# Patient Record
Sex: FEMALE | Race: BLACK OR AFRICAN AMERICAN | NOT HISPANIC OR LATINO | Employment: OTHER | URBAN - METROPOLITAN AREA
[De-identification: names, ages, dates, MRNs, and addresses within clinical notes are randomized per-mention and may not be internally consistent; named-entity substitution may affect disease eponyms.]

---

## 2017-01-31 DIAGNOSIS — F03.90 DEMENTIA WITHOUT BEHAVIORAL DISTURBANCE, UNSPECIFIED DEMENTIA TYPE: ICD-10-CM

## 2017-01-31 RX ORDER — DONEPEZIL HYDROCHLORIDE 10 MG/1
10 TABLET, FILM COATED ORAL DAILY
Qty: 90 TABLET | Refills: 1 | Status: SHIPPED | OUTPATIENT
Start: 2017-01-31 | End: 2017-05-31 | Stop reason: SDUPTHER

## 2017-02-13 ENCOUNTER — OFFICE VISIT (OUTPATIENT)
Dept: FAMILY MEDICINE | Facility: CLINIC | Age: 82
End: 2017-02-13
Payer: MEDICARE

## 2017-02-13 VITALS
TEMPERATURE: 99 F | WEIGHT: 123.69 LBS | SYSTOLIC BLOOD PRESSURE: 126 MMHG | DIASTOLIC BLOOD PRESSURE: 82 MMHG | BODY MASS INDEX: 21.91 KG/M2 | HEIGHT: 63 IN | HEART RATE: 95 BPM | OXYGEN SATURATION: 97 % | RESPIRATION RATE: 17 BRPM

## 2017-02-13 DIAGNOSIS — E11.9 TYPE 2 DIABETES MELLITUS WITHOUT COMPLICATION, UNSPECIFIED LONG TERM INSULIN USE STATUS: ICD-10-CM

## 2017-02-13 DIAGNOSIS — K59.00 CONSTIPATION, UNSPECIFIED CONSTIPATION TYPE: Primary | ICD-10-CM

## 2017-02-13 PROCEDURE — 1159F MED LIST DOCD IN RCRD: CPT | Mod: S$GLB,,, | Performed by: NURSE PRACTITIONER

## 2017-02-13 PROCEDURE — 99999 PR PBB SHADOW E&M-EST. PATIENT-LVL III: CPT | Mod: PBBFAC,,, | Performed by: NURSE PRACTITIONER

## 2017-02-13 PROCEDURE — 1160F RVW MEDS BY RX/DR IN RCRD: CPT | Mod: S$GLB,,, | Performed by: NURSE PRACTITIONER

## 2017-02-13 PROCEDURE — 1157F ADVNC CARE PLAN IN RCRD: CPT | Mod: S$GLB,,, | Performed by: NURSE PRACTITIONER

## 2017-02-13 PROCEDURE — 99214 OFFICE O/P EST MOD 30 MIN: CPT | Mod: S$GLB,,, | Performed by: NURSE PRACTITIONER

## 2017-02-13 PROCEDURE — 1125F AMNT PAIN NOTED PAIN PRSNT: CPT | Mod: S$GLB,,, | Performed by: NURSE PRACTITIONER

## 2017-02-13 PROCEDURE — 99499 UNLISTED E&M SERVICE: CPT | Mod: S$GLB,,, | Performed by: NURSE PRACTITIONER

## 2017-02-13 RX ORDER — DOCUSATE SODIUM 250 MG
250 CAPSULE ORAL DAILY
Status: ON HOLD | COMMUNITY
Start: 2017-02-13 | End: 2018-02-08

## 2017-02-13 RX ORDER — LANCETS
1 EACH MISCELLANEOUS DAILY
Qty: 100 EACH | Refills: 1 | Status: SHIPPED | OUTPATIENT
Start: 2017-02-13 | End: 2017-05-03 | Stop reason: SDUPTHER

## 2017-02-13 RX ORDER — POLYETHYLENE GLYCOL 3350 17 G/17G
17 POWDER, FOR SOLUTION ORAL DAILY
Qty: 238 G | Refills: 0 | Status: ON HOLD | OUTPATIENT
Start: 2017-02-13 | End: 2018-02-08

## 2017-02-13 NOTE — PROGRESS NOTES
Subjective:       Patient ID: Delfina Pelletier is a 88 y.o. female.    Chief Complaint: Abdominal Pain (with swelling- LUQ ); Medication Refill; and Diabetic Foot Exam    HPI Ms Pelletier was sent here from [facility] for a swollen stomach. She denies any pain, N/V/D/F/C, no injury.  She has had constipation in the past.  She has a good appetite.  Review of Systems   Constitutional: Negative for fever.   Respiratory: Negative.    Cardiovascular: Negative.        Objective:      Physical Exam   Constitutional: She appears well-developed and well-nourished. She does not appear ill. No distress.   Cardiovascular: Normal rate, regular rhythm and normal heart sounds.  Exam reveals no friction rub.    No murmur heard.  Pulmonary/Chest: Effort normal and breath sounds normal. No respiratory distress. She has no decreased breath sounds. She has no wheezes. She has no rhonchi. She has no rales.   She has some prominent ribs to her left lower side, no TTP   Abdominal: There is no hepatosplenomegaly. There is no tenderness. There is no rebound and negative Casey's sign.   Skin: Skin is warm and dry. No erythema.   Psychiatric: She has a normal mood and affect. Her behavior is normal.   Vitals reviewed.      Assessment:       1. Constipation, unspecified constipation type    2. Type 2 diabetes mellitus without complication, unspecified long term insulin use status        Plan:       Constipation, unspecified constipation type  -     polyethylene glycol (MIRALAX) 17 gram/dose powder; Take 17 g by mouth once daily.  Dispense: 238 g; Refill: 0  -     docusate sodium (STOOL SOFTENER) 250 MG capsule; Take 1 capsule (250 mg total) by mouth once daily.    Type 2 diabetes mellitus without complication, unspecified long term insulin use status  -     blood sugar diagnostic Strp; 1 each by Misc.(Non-Drug; Combo Route) route once daily. True Metrix Glucometer Test Strips  Test blood sugar once daily  Dispense: 100 each; Refill: 1  -     lancets  Misc; 1 each by Misc.(Non-Drug; Combo Route) route once daily. TRUE METRIX METER  Dispense: 100 each; Refill: 1    H/o L pneumonectomy, no abnormalities noted in abdominal CT or CXR.  No red flags on exam.  Continue to monitor, consider imaging if she develops any problems.    Verbalized understanding

## 2017-02-13 NOTE — MR AVS SNAPSHOT
[facility]  [address] [place] [address]  Phone: [phone]                  Delfina Pelletier   2017 11:00 AM   Office Visit    Description:  Female : 10/26/1928   Provider:  AMALIA Martinez   Department:  [facility]           Reason for Visit     Abdominal Pain     Medication Refill     Diabetic Foot Exam           Diagnoses this Visit        Comments    Constipation, unspecified constipation type    -  Primary     Type 2 diabetes mellitus without complication, unspecified long term insulin use status                To Do List           Future Appointments        Provider Department Dept Phone    2017 11:00 AM Josefina Whitley NP-C [facility] [phone]      Goals (5 Years of Data)     None       These Medications        Disp Refills Start End    blood sugar diagnostic Strp 100 each 1 2017     1 each by Misc.(Non-Drug; Combo Route) route once daily. True Metrix Glucometer Test Strips  Test blood sugar once daily - Misc.(Non-Drug; Combo Route)    [facility] Ph #: [phone]       lancets Misc 100 each 1 2017     1 each by Misc.(Non-Drug; Combo Route) route once daily. TRUE METRIX METER - Misc.(Non-Drug; Combo Route)    [facility] Ph #: [phone]       polyethylene glycol (MIRALAX) 17 gram/dose powder 238 g 0 2017     Take 17 g by mouth once daily. - Oral    [facility] Ph #: [phone]         PURCHASE these Medications (No prescription required)        Start End    docusate sodium (STOOL SOFTENER) 250 MG capsule 2017     Sig - Route: Take 1 capsule (250 mg total) by mouth once daily. - Oral    Class: OTC      [facility]     [facility] -  Assistance  Registered  nurses in the [facility] provide clinical advisement, health education, appointment booking, and other advisory services.  Call for this free service at [phone].             Medications           Message regarding Medications     Verify the changes and/or additions to your medication regime listed below are the same as discussed with your clinician today.  If any of these changes or additions are incorrect, please notify your healthcare provider.        START taking these NEW medications        Refills    lancets Misc 1    Si each by Misc.(Non-Drug; Combo Route) route once daily. TRUE METRIX METER    Class: Print    Route: Misc.(Non-Drug; Combo Route)    polyethylene glycol (MIRALAX) 17 gram/dose powder 0    Sig: Take 17 g by mouth once daily.    Class: Normal    Route: Oral      CHANGE how you are taking these medications     Start Taking Instead of    blood sugar diagnostic Strp blood sugar diagnostic Strp    Dosage:  1 each by Misc.(Non-Drug; Combo Route) route once daily. True Metrix Glucometer Test Strips  Test blood sugar once daily Dosage:  1 each by Misc.(Non-Drug; Combo Route) route as directed. True Metrix Glucometer Test Strips  Test blood sugar once daily    Reason for Change:  Reorder     docusate sodium (STOOL SOFTENER) 250 MG capsule docusate sodium (STOOL SOFTENER) 250 MG capsule    Dosage:  Take 1 capsule (250 mg total) by mouth once daily. Dosage:  Take 250 mg by mouth once daily.    Reason for Change:  Reorder            Verify that the below list of medications is an accurate representation of the medications you are currently taking.  If none reported, the list may be blank. If incorrect, please contact your healthcare provider. Carry this list with you in case of emergency.           Current Medications     blood sugar diagnostic Strp 1 each by Misc.(Non-Drug; Combo Route) route once daily. True Metrix Glucometer Test Strips  Test blood sugar once daily    blood-glucose  "meter kit Use as instructed    donepezil (ARICEPT) 10 MG tablet Take 1 tablet (10 mg total) by mouth once daily.    lancets Misc 1 each by Misc.(Non-Drug; Combo Route) route once daily. TRUE METRIX METER    metformin (GLUCOPHAGE) 500 MG tablet Take 1 tablet (500 mg total) by mouth 2 (two) times daily with meals.    nifedipine (NIFEDICAL XL) 60 MG (OSM) 24 hr tablet Take 1 tablet (60 mg total) by mouth once daily.    rosuvastatin (CRESTOR) 20 MG tablet Take 1 tablet (20 mg total) by mouth once daily.    docusate sodium (STOOL SOFTENER) 250 MG capsule Take 1 capsule (250 mg total) by mouth once daily.    polyethylene glycol (MIRALAX) 17 gram/dose powder Take 17 g by mouth once daily.           Clinical Reference Information           Your Vitals Were     BP Pulse Temp Resp    126/82 (BP Location: Left arm, Patient Position: Sitting, BP Method: Manual) 95 98.7 °F (37.1 °C) (Oral) 17    Height Weight SpO2 BMI    5' 2.5" (1.588 m) 56.1 kg (123 lb 10.9 oz) 97% 22.26 kg/m2      Blood Pressure          Most Recent Value    BP  126/82      Allergies as of 2/13/2017     No Known Allergies      Immunizations Administered on Date of Encounter - 2/13/2017     None      [facility] Sign-Up     Activating your [facility] account is as easy as 1-2-3!     1) Visit [email or web address], select Sign Up Now, enter this activation code and your date of birth, then select Next.  [email or web address]  Expires: 3/30/2017 10:50 AM      2) Create a username and password to use when you visit [facility] in the future and select a security question in case you lose your password and select Next.    3) Enter your e-mail address and click Sign Up!    Additional Information  If you have questions, please e-mail [email or web address] or call [phone] to talk to our [facility] staff. Remember, [facility] is NOT to be used for urgent needs. For medical emergencies, dial 911.         Instructions    Follow up if not improved  Go to ER for new worse or " concerning symptoms    Constipation (Adult)  Constipation means that you have bowel movements that are less frequent than usual. Stools often become very hard and difficult to pass.  Constipation is very common. At some point in life it affects almost everyone. Since everyone's bowel habits are different, what is constipation to one person may not be to another. Your healthcare provider may do tests to diagnose constipation. It depends on what he or she finds when evaluating you.    Symptoms of constipation include:  · Abdominal pain  · Bloating  · Vomiting  · Painful bowel movements  · Itching, swelling, bleeding, or pain around the anus  Causes  Constipation can have many causes. These include:  · Diet low in fiber  · Too much dairy  · Not drinking enough liquids  · Lack of exercise or physical activity. This is especially true for older adults.  · Changes in lifestyle or daily routine, including pregnancy, aging, work, and travel  · Frequent use or misuse of laxatives  · Ignoring the urge to have a bowel movement or delaying it until later  · Medicines, such as certain prescription pain medicines, iron supplements, antacids, certain antidepressants, and calcium supplements  · Diseases like irritable bowel syndrome, bowel obstructions, stroke, diabetes, thyroid disease, Parkinson disease, hemorrhoids, and colon cancer  Complications  Potential complications of constipation can include:  · Hemorrhoids  · Rectal bleeding from hemorrhoids or anal fissures (skin tears)  · Hernias  · Dependency on laxatives  · Chronic constipation  · Fecal impaction  · Bowel obstruction or perforation  Home care  All treatment should be done after talking with your healthcare provider. This is especially true if you have another medical problems, are taking prescription medicines, or are an older adult. Treatment most often involves lifestyle changes. You may also need medicines. Your healthcare provider will tell you which will work  best for you. Follow the advice below to help avoid this problem in the future.  Lifestyle changes  These lifestyle changes can help prevent constipation:  · Diet. Eat a high-fiber diet, with fresh fruit and vegetables, and reduce dairy intake, meats, and processed foods  · Fluids. It's important to get enough fluids each day. Drink plenty of water when you eat more fiber. If you are on diet that limits the amount of fluid you can have, talk about this with your healthcare provider.  · Regular exercise. Check with your healthcare provider first.  Medications  Take any medicines as directed. Some laxatives are safe to use only every now and then. Others can be taken on a regular basis. Talk with your doctor or pharmacist if you have questions.  Prescription pain medicines can cause constipation. If you are taking this kind of medicine, ask your healthcare provider if you should also take a stool softener.  Medicines you may take to treat constipation include:  · Fiber supplements  · Stool softeners  · Laxatives  · Enemas  · Rectal suppositories  Follow-up care  Follow up with your healthcare provider if symptoms don't get better in the next few days. You may need to have more tests or see a specialist.  Call 911  Call 911 if any of these occur:  · Trouble breathing  · Stiff, rigid abdomen that is severely painful to touch  · Confusion  · Fainting or loss of consciousness  · Rapid heart rate  · Chest pain  When to seek medical advice  Call your healthcare provider right away if any of these occur:  · Fever over 100.4°F (38°C)  · Failure to resume normal bowel movements  · Pain in your abdomen or back gets worse  · Nausea or vomiting  · Swelling in your abdomen  · Blood in the stool  · Black, tarry stool  · Involuntary weight loss  · Weakness  Date Last Reviewed: 12/30/2015  © 2389-5862 [employer]. [address] [place] [address], [place] [address]. All rights reserved. This information is not intended as a  substitute for professional medical care. Always follow your healthcare professional's instructions.             Language Assistance Services     ATTENTION: Language assistance services are available, free of charge. Please call [phone].      ATENCIÓN: Si habirena medellin, tiene a duran disposición servicios gratuitos de asistencia lingüística. Llame al [phone].     CHÚ Ý: N?u b?n nói Ti?ng Vi?t, có các d?ch v? h? tr? ngôn ng? mi?n phí dành cho b?n. G?i s? [phone].         [facility] complies with applicable Federal civil rights laws and does not discriminate on the basis of race, color, national origin, age, disability, or sex.

## 2017-02-13 NOTE — PATIENT INSTRUCTIONS
Follow up if not improved  Go to ER for new worse or concerning symptoms    Constipation (Adult)  Constipation means that you have bowel movements that are less frequent than usual. Stools often become very hard and difficult to pass.  Constipation is very common. At some point in life it affects almost everyone. Since everyone's bowel habits are different, what is constipation to one person may not be to another. Your healthcare provider may do tests to diagnose constipation. It depends on what he or she finds when evaluating you.    Symptoms of constipation include:  · Abdominal pain  · Bloating  · Vomiting  · Painful bowel movements  · Itching, swelling, bleeding, or pain around the anus  Causes  Constipation can have many causes. These include:  · Diet low in fiber  · Too much dairy  · Not drinking enough liquids  · Lack of exercise or physical activity. This is especially true for older adults.  · Changes in lifestyle or daily routine, including pregnancy, aging, work, and travel  · Frequent use or misuse of laxatives  · Ignoring the urge to have a bowel movement or delaying it until later  · Medicines, such as certain prescription pain medicines, iron supplements, antacids, certain antidepressants, and calcium supplements  · Diseases like irritable bowel syndrome, bowel obstructions, stroke, diabetes, thyroid disease, Parkinson disease, hemorrhoids, and colon cancer  Complications  Potential complications of constipation can include:  · Hemorrhoids  · Rectal bleeding from hemorrhoids or anal fissures (skin tears)  · Hernias  · Dependency on laxatives  · Chronic constipation  · Fecal impaction  · Bowel obstruction or perforation  Home care  All treatment should be done after talking with your healthcare provider. This is especially true if you have another medical problems, are taking prescription medicines, or are an older adult. Treatment most often involves lifestyle changes. You may also need medicines.  Your healthcare provider will tell you which will work best for you. Follow the advice below to help avoid this problem in the future.  Lifestyle changes  These lifestyle changes can help prevent constipation:  · Diet. Eat a high-fiber diet, with fresh fruit and vegetables, and reduce dairy intake, meats, and processed foods  · Fluids. It's important to get enough fluids each day. Drink plenty of water when you eat more fiber. If you are on diet that limits the amount of fluid you can have, talk about this with your healthcare provider.  · Regular exercise. Check with your healthcare provider first.  Medications  Take any medicines as directed. Some laxatives are safe to use only every now and then. Others can be taken on a regular basis. Talk with your doctor or pharmacist if you have questions.  Prescription pain medicines can cause constipation. If you are taking this kind of medicine, ask your healthcare provider if you should also take a stool softener.  Medicines you may take to treat constipation include:  · Fiber supplements  · Stool softeners  · Laxatives  · Enemas  · Rectal suppositories  Follow-up care  Follow up with your healthcare provider if symptoms don't get better in the next few days. You may need to have more tests or see a specialist.  Call 911  Call 911 if any of these occur:  · Trouble breathing  · Stiff, rigid abdomen that is severely painful to touch  · Confusion  · Fainting or loss of consciousness  · Rapid heart rate  · Chest pain  When to seek medical advice  Call your healthcare provider right away if any of these occur:  · Fever over 100.4°F (38°C)  · Failure to resume normal bowel movements  · Pain in your abdomen or back gets worse  · Nausea or vomiting  · Swelling in your abdomen  · Blood in the stool  · Black, tarry stool  · Involuntary weight loss  · Weakness  Date Last Reviewed: 12/30/2015  © 0929-9736 [employer]. [address] [place] [address], [place] [address]. All  rights reserved. This information is not intended as a substitute for professional medical care. Always follow your healthcare professional's instructions.

## 2017-05-03 DIAGNOSIS — E11.9 TYPE 2 DIABETES MELLITUS WITHOUT COMPLICATION, UNSPECIFIED LONG TERM INSULIN USE STATUS: ICD-10-CM

## 2017-05-03 RX ORDER — LANCETS
1 EACH MISCELLANEOUS DAILY
Qty: 100 EACH | Refills: 1 | Status: SHIPPED | OUTPATIENT
Start: 2017-05-03

## 2017-05-03 NOTE — TELEPHONE ENCOUNTER
----- Message from Tatyana Cervantes sent at 5/3/2017  1:54 PM CDT -----  Contact: Naomi/Maritza/[phone]  Refill:  blood sugar diagnostic Strp  Refill:  lancets Misc    Thank you.

## 2017-05-31 ENCOUNTER — OFFICE VISIT (OUTPATIENT)
Dept: FAMILY MEDICINE | Facility: CLINIC | Age: 82
End: 2017-05-31
Payer: MEDICARE

## 2017-05-31 VITALS
HEIGHT: 63 IN | BODY MASS INDEX: 21.05 KG/M2 | HEART RATE: 76 BPM | DIASTOLIC BLOOD PRESSURE: 80 MMHG | WEIGHT: 118.81 LBS | TEMPERATURE: 98 F | RESPIRATION RATE: 17 BRPM | OXYGEN SATURATION: 99 % | SYSTOLIC BLOOD PRESSURE: 124 MMHG

## 2017-05-31 DIAGNOSIS — E78.2 COMBINED HYPERLIPIDEMIA ASSOCIATED WITH TYPE 2 DIABETES MELLITUS: Chronic | ICD-10-CM

## 2017-05-31 DIAGNOSIS — F03.90 DEMENTIA WITHOUT BEHAVIORAL DISTURBANCE, UNSPECIFIED DEMENTIA TYPE: ICD-10-CM

## 2017-05-31 DIAGNOSIS — I15.2 HYPERTENSION ASSOCIATED WITH DIABETES: Chronic | ICD-10-CM

## 2017-05-31 DIAGNOSIS — E11.9 TYPE 2 DIABETES MELLITUS WITHOUT COMPLICATION, WITHOUT LONG-TERM CURRENT USE OF INSULIN: Primary | ICD-10-CM

## 2017-05-31 DIAGNOSIS — M89.9 DISORDER OF BONE AND CARTILAGE: ICD-10-CM

## 2017-05-31 DIAGNOSIS — E11.59 HYPERTENSION ASSOCIATED WITH DIABETES: Chronic | ICD-10-CM

## 2017-05-31 DIAGNOSIS — E11.69 COMBINED HYPERLIPIDEMIA ASSOCIATED WITH TYPE 2 DIABETES MELLITUS: Chronic | ICD-10-CM

## 2017-05-31 DIAGNOSIS — Z23 NEED FOR VACCINATION: ICD-10-CM

## 2017-05-31 DIAGNOSIS — M94.9 DISORDER OF BONE AND CARTILAGE: ICD-10-CM

## 2017-05-31 PROCEDURE — 99214 OFFICE O/P EST MOD 30 MIN: CPT | Mod: 25,S$GLB,, | Performed by: INTERNAL MEDICINE

## 2017-05-31 PROCEDURE — 99499 UNLISTED E&M SERVICE: CPT | Mod: S$GLB,,, | Performed by: INTERNAL MEDICINE

## 2017-05-31 PROCEDURE — 99999 PR PBB SHADOW E&M-EST. PATIENT-LVL III: CPT | Mod: PBBFAC,,, | Performed by: INTERNAL MEDICINE

## 2017-05-31 PROCEDURE — 90471 IMMUNIZATION ADMIN: CPT | Mod: S$GLB,,, | Performed by: INTERNAL MEDICINE

## 2017-05-31 PROCEDURE — 90715 TDAP VACCINE 7 YRS/> IM: CPT | Mod: S$GLB,,, | Performed by: INTERNAL MEDICINE

## 2017-05-31 PROCEDURE — 1125F AMNT PAIN NOTED PAIN PRSNT: CPT | Mod: S$GLB,,, | Performed by: INTERNAL MEDICINE

## 2017-05-31 PROCEDURE — 1157F ADVNC CARE PLAN IN RCRD: CPT | Mod: S$GLB,,, | Performed by: INTERNAL MEDICINE

## 2017-05-31 PROCEDURE — 1159F MED LIST DOCD IN RCRD: CPT | Mod: S$GLB,,, | Performed by: INTERNAL MEDICINE

## 2017-05-31 RX ORDER — METFORMIN HYDROCHLORIDE 500 MG/1
500 TABLET ORAL 2 TIMES DAILY WITH MEALS
Qty: 180 TABLET | Refills: 1 | Status: ON HOLD | OUTPATIENT
Start: 2017-05-31 | End: 2018-02-08

## 2017-05-31 RX ORDER — ROSUVASTATIN CALCIUM 20 MG/1
20 TABLET, COATED ORAL DAILY
Qty: 90 TABLET | Refills: 1 | Status: SHIPPED | OUTPATIENT
Start: 2017-05-31 | End: 2017-12-29 | Stop reason: SDUPTHER

## 2017-05-31 RX ORDER — METFORMIN HYDROCHLORIDE 500 MG/1
TABLET ORAL
COMMUNITY
Start: 2017-04-14 | End: 2017-05-31

## 2017-05-31 RX ORDER — NIFEDIPINE 60 MG/1
60 TABLET, EXTENDED RELEASE ORAL DAILY
Qty: 90 TABLET | Refills: 1 | Status: SHIPPED | OUTPATIENT
Start: 2017-05-31 | End: 2018-01-19 | Stop reason: SDUPTHER

## 2017-05-31 RX ORDER — DONEPEZIL HYDROCHLORIDE 10 MG/1
10 TABLET, FILM COATED ORAL DAILY
Qty: 90 TABLET | Refills: 1 | Status: SHIPPED | OUTPATIENT
Start: 2017-05-31 | End: 2017-10-22 | Stop reason: SDUPTHER

## 2017-05-31 NOTE — PROGRESS NOTES
Subjective:       Patient ID: Delfina Pelletier is a 88 y.o. female.    Chief Complaint: Other (bedwetting- started about one week ago - per daughter ); Diabetes; and Follow-up    She presents with her daughter for follow-up.  She reports that her constipation is improved.  Her mother however has become incontinent overnight.  She is having to wear adult diapers.  Appetite is stable.  She notes no other changes.  She has been compliant with the medications.      Review of Systems   Constitutional: Negative for appetite change and unexpected weight change.   Cardiovascular: Negative for leg swelling.   Gastrointestinal: Negative for constipation.   Skin: Negative for wound.       Objective:      Physical Exam   Constitutional: She is oriented to person, place, and time. She appears well-developed and well-nourished. No distress.   HENT:   Head: Normocephalic and atraumatic.   Right Ear: External ear normal.   Left Ear: External ear normal.   Mouth/Throat: Oropharynx is clear and moist. No oropharyngeal exudate.   Eyes: Conjunctivae and EOM are normal. No scleral icterus.   Cardiovascular: Normal rate, regular rhythm and normal heart sounds.  Exam reveals no gallop and no friction rub.    No murmur heard.  Pulmonary/Chest: Effort normal and breath sounds normal. No respiratory distress. She has no wheezes. She has no rales.   Abdominal: Soft. She exhibits no distension. There is no tenderness. There is no rebound and no guarding.   Musculoskeletal: She exhibits no edema or tenderness.   Protective Sensation (w/ 10 gram monofilament):  Right: Decreased  Left: Intact    Visual Inspection:  Normal -  Bilateral    Pedal Pulses:   Right: Present  Left: Present       Neurological: She is alert and oriented to person, place, and time. No cranial nerve deficit.   Skin: Skin is warm and dry.   Psychiatric: She has a normal mood and affect.   Vitals reviewed.      Assessment:       1. Type 2 diabetes mellitus without complication,  without long-term current use of insulin    2. Combined hyperlipidemia associated with type 2 diabetes mellitus    3. Hypertension associated with diabetes    4. Dementia without behavioral disturbance, unspecified dementia type    5. Need for vaccination    6. Disorder of bone and cartilage        Plan:       Delfina was seen today for other, diabetes and follow-up.    Diagnoses and all orders for this visit:    Type 2 diabetes mellitus without complication, without long-term current use of insulin - well controlled.  Repeat A1c  -     metformin (GLUCOPHAGE) 500 MG tablet; Take 1 tablet (500 mg total) by mouth 2 (two) times daily with meals.    Combined hyperlipidemia associated with type 2 diabetes mellitus - at goal  -     rosuvastatin (CRESTOR) 20 MG tablet; Take 1 tablet (20 mg total) by mouth once daily.    Hypertension associated with diabetes - BP at goal  -     nifedipine (NIFEDICAL XL) 60 MG (OSM) 24 hr tablet; Take 1 tablet (60 mg total) by mouth once daily.    Dementia without behavioral disturbance, unspecified dementia type - stable  -     donepezil (ARICEPT) 10 MG tablet; Take 1 tablet (10 mg total) by mouth once daily.    Need for vaccination  -     Tdap Vaccine    Disorder of bone and cartilage  -     DXA Bone Density Spine And Hip; Future       f/u 6 months.

## 2017-06-15 ENCOUNTER — HOSPITAL ENCOUNTER (OUTPATIENT)
Dept: RADIOLOGY | Facility: CLINIC | Age: 82
Discharge: HOME | End: 2017-06-15
Attending: INTERNAL MEDICINE
Payer: MEDICARE

## 2017-06-15 PROCEDURE — 77080 DXA BONE DENSITY AXIAL: CPT | Mod: 26,,, | Performed by: INTERNAL MEDICINE

## 2017-06-15 PROCEDURE — 77080 DXA BONE DENSITY AXIAL: CPT | Mod: TC,PO

## 2017-06-16 ENCOUNTER — LAB VISIT (OUTPATIENT)
Dept: LAB | Facility: HOSPITAL | Age: 82
End: 2017-06-16
Attending: INTERNAL MEDICINE
Payer: MEDICARE

## 2017-06-16 DIAGNOSIS — E11.9 TYPE 2 DIABETES MELLITUS WITHOUT COMPLICATION: ICD-10-CM

## 2017-06-16 LAB
CREAT UR-MCNC: 152 MG/DL
MICROALBUMIN UR DL<=1MG/L-MCNC: 74 UG/ML
MICROALBUMIN/CREATININE RATIO: 48.7 UG/MG

## 2017-06-16 PROCEDURE — 82570 ASSAY OF URINE CREATININE: CPT

## 2017-06-22 ENCOUNTER — TELEPHONE (OUTPATIENT)
Dept: FAMILY MEDICINE | Facility: CLINIC | Age: 82
End: 2017-06-22

## 2017-06-22 DIAGNOSIS — M81.0 AGE-RELATED OSTEOPOROSIS WITHOUT CURRENT PATHOLOGICAL FRACTURE: Primary | ICD-10-CM

## 2017-06-22 RX ORDER — RISEDRONATE SODIUM 35 MG/1
35 TABLET, FILM COATED ORAL
Qty: 4 TABLET | Refills: 11 | Status: SHIPPED | OUTPATIENT
Start: 2017-06-22 | End: 2017-08-31

## 2017-06-22 NOTE — TELEPHONE ENCOUNTER
Notify the patient's daughter that her bone density scan does show osteoporosis.  I have sent a prescription for Risedronate.  This medication is taken once weekly.  She should be sure to sit up for at least 30 minutes after taking the medication.  She should call with any GI symptoms or difficulties swallowing, painful swallowing after starting the medication.  She should also be taking a calcium and vitamin D supplements such as Caltrate D or Os-Ti D twice daily.

## 2017-06-23 ENCOUNTER — TELEPHONE (OUTPATIENT)
Dept: FAMILY MEDICINE | Facility: CLINIC | Age: 82
End: 2017-06-23

## 2017-06-23 NOTE — TELEPHONE ENCOUNTER
----- Message from Lori Mcgarry sent at 6/23/2017  7:56 AM CDT -----  Contact: daughter  Daughter called not sure what type of calcium tablets to get. Please contact her at [phone].    Thanks!

## 2017-08-31 ENCOUNTER — TELEPHONE (OUTPATIENT)
Dept: FAMILY MEDICINE | Facility: CLINIC | Age: 82
End: 2017-08-31

## 2017-08-31 ENCOUNTER — HOSPITAL ENCOUNTER (OUTPATIENT)
Facility: HOSPITAL | Age: 82
Discharge: HOME HEALTH | End: 2017-09-02
Attending: EMERGENCY MEDICINE | Admitting: HOSPITALIST
Payer: MEDICARE

## 2017-08-31 ENCOUNTER — OFFICE VISIT (OUTPATIENT)
Dept: FAMILY MEDICINE | Facility: CLINIC | Age: 82
End: 2017-08-31
Payer: MEDICARE

## 2017-08-31 VITALS
TEMPERATURE: 98 F | OXYGEN SATURATION: 97 % | RESPIRATION RATE: 16 BRPM | SYSTOLIC BLOOD PRESSURE: 130 MMHG | HEIGHT: 63 IN | WEIGHT: 104.94 LBS | HEART RATE: 124 BPM | BODY MASS INDEX: 18.59 KG/M2 | DIASTOLIC BLOOD PRESSURE: 70 MMHG

## 2017-08-31 DIAGNOSIS — R00.0 TACHYCARDIA: ICD-10-CM

## 2017-08-31 DIAGNOSIS — R63.0 LOSS OF APPETITE: Primary | ICD-10-CM

## 2017-08-31 DIAGNOSIS — F03.90 DEMENTIA: ICD-10-CM

## 2017-08-31 DIAGNOSIS — E86.0 DEHYDRATION: Primary | ICD-10-CM

## 2017-08-31 DIAGNOSIS — R63.4 WEIGHT LOSS: ICD-10-CM

## 2017-08-31 DIAGNOSIS — R63.0 ANOREXIA: ICD-10-CM

## 2017-08-31 PROBLEM — E87.6 HYPOKALEMIA: Status: ACTIVE | Noted: 2017-08-31

## 2017-08-31 LAB
ALBUMIN SERPL BCP-MCNC: 3.1 G/DL
ALP SERPL-CCNC: 79 U/L
ALT SERPL W/O P-5'-P-CCNC: 8 U/L
AMORPH CRY UR QL COMP ASSIST: ABNORMAL
ANION GAP SERPL CALC-SCNC: 10 MMOL/L
AST SERPL-CCNC: 16 U/L
BACTERIA #/AREA URNS AUTO: ABNORMAL /HPF
BASOPHILS # BLD AUTO: 0.03 K/UL
BASOPHILS NFR BLD: 0.4 %
BILIRUB SERPL-MCNC: 0.3 MG/DL
BILIRUB UR QL STRIP: NEGATIVE
BUN SERPL-MCNC: 30 MG/DL
CALCIUM SERPL-MCNC: 9.2 MG/DL
CHLORIDE SERPL-SCNC: 113 MMOL/L
CLARITY UR REFRACT.AUTO: ABNORMAL
CO2 SERPL-SCNC: 24 MMOL/L
COLOR UR AUTO: ABNORMAL
CREAT SERPL-MCNC: 1.5 MG/DL
DIFFERENTIAL METHOD: ABNORMAL
EOSINOPHIL # BLD AUTO: 0 K/UL
EOSINOPHIL NFR BLD: 0.1 %
ERYTHROCYTE [DISTWIDTH] IN BLOOD BY AUTOMATED COUNT: 13.6 %
EST. GFR  (AFRICAN AMERICAN): 35.6 ML/MIN/1.73 M^2
EST. GFR  (NON AFRICAN AMERICAN): 30.9 ML/MIN/1.73 M^2
GLUCOSE SERPL-MCNC: 126 MG/DL
GLUCOSE UR QL STRIP: NEGATIVE
HCT VFR BLD AUTO: 39 %
HGB BLD-MCNC: 12.3 G/DL
HGB UR QL STRIP: ABNORMAL
HYALINE CASTS UR QL AUTO: 0 /LPF
KETONES UR QL STRIP: ABNORMAL
LEUKOCYTE ESTERASE UR QL STRIP: ABNORMAL
LYMPHOCYTES # BLD AUTO: 1.4 K/UL
LYMPHOCYTES NFR BLD: 17.2 %
MCH RBC QN AUTO: 27.8 PG
MCHC RBC AUTO-ENTMCNC: 31.5 G/DL
MCV RBC AUTO: 88 FL
MICROSCOPIC COMMENT: ABNORMAL
MONOCYTES # BLD AUTO: 0.8 K/UL
MONOCYTES NFR BLD: 9.3 %
NEUTROPHILS # BLD AUTO: 5.9 K/UL
NEUTROPHILS NFR BLD: 72.5 %
NITRITE UR QL STRIP: NEGATIVE
PH UR STRIP: 5 [PH] (ref 5–8)
PLATELET # BLD AUTO: 239 K/UL
PMV BLD AUTO: 12.7 FL
POCT GLUCOSE: 122 MG/DL (ref 70–110)
POCT GLUCOSE: 138 MG/DL (ref 70–110)
POCT GLUCOSE: 168 MG/DL (ref 70–110)
POTASSIUM SERPL-SCNC: 3.1 MMOL/L
PROT SERPL-MCNC: 7.3 G/DL
PROT UR QL STRIP: ABNORMAL
RBC # BLD AUTO: 4.43 M/UL
RBC #/AREA URNS AUTO: >100 /HPF (ref 0–4)
SODIUM SERPL-SCNC: 147 MMOL/L
SP GR UR STRIP: 1.02 (ref 1–1.03)
URN SPEC COLLECT METH UR: ABNORMAL
UROBILINOGEN UR STRIP-ACNC: 2 EU/DL
WBC # BLD AUTO: 8.13 K/UL
WBC #/AREA URNS AUTO: 50 /HPF (ref 0–5)

## 2017-08-31 PROCEDURE — 93010 ELECTROCARDIOGRAM REPORT: CPT | Mod: S$GLB,,, | Performed by: INTERNAL MEDICINE

## 2017-08-31 PROCEDURE — 87086 URINE CULTURE/COLONY COUNT: CPT

## 2017-08-31 PROCEDURE — 93005 ELECTROCARDIOGRAM TRACING: CPT

## 2017-08-31 PROCEDURE — 1126F AMNT PAIN NOTED NONE PRSNT: CPT | Mod: S$GLB,,, | Performed by: INTERNAL MEDICINE

## 2017-08-31 PROCEDURE — 36000 PLACE NEEDLE IN VEIN: CPT

## 2017-08-31 PROCEDURE — 85025 COMPLETE CBC W/AUTO DIFF WBC: CPT | Mod: 91

## 2017-08-31 PROCEDURE — 1157F ADVNC CARE PLAN IN RCRD: CPT | Mod: S$GLB,,, | Performed by: INTERNAL MEDICINE

## 2017-08-31 PROCEDURE — 1159F MED LIST DOCD IN RCRD: CPT | Mod: S$GLB,,, | Performed by: INTERNAL MEDICINE

## 2017-08-31 PROCEDURE — 96372 THER/PROPH/DIAG INJ SC/IM: CPT

## 2017-08-31 PROCEDURE — 3008F BODY MASS INDEX DOCD: CPT | Mod: S$GLB,,, | Performed by: INTERNAL MEDICINE

## 2017-08-31 PROCEDURE — 99284 EMERGENCY DEPT VISIT MOD MDM: CPT

## 2017-08-31 PROCEDURE — 87088 URINE BACTERIA CULTURE: CPT

## 2017-08-31 PROCEDURE — 63600175 PHARM REV CODE 636 W HCPCS: Performed by: HOSPITALIST

## 2017-08-31 PROCEDURE — 63600175 PHARM REV CODE 636 W HCPCS: Performed by: NURSE PRACTITIONER

## 2017-08-31 PROCEDURE — G0378 HOSPITAL OBSERVATION PER HR: HCPCS

## 2017-08-31 PROCEDURE — 81001 URINALYSIS AUTO W/SCOPE: CPT

## 2017-08-31 PROCEDURE — 96361 HYDRATE IV INFUSION ADD-ON: CPT

## 2017-08-31 PROCEDURE — 96365 THER/PROPH/DIAG IV INF INIT: CPT

## 2017-08-31 PROCEDURE — 80053 COMPREHEN METABOLIC PANEL: CPT | Mod: 91

## 2017-08-31 PROCEDURE — 99284 EMERGENCY DEPT VISIT MOD MDM: CPT | Mod: 25

## 2017-08-31 PROCEDURE — 25000003 PHARM REV CODE 250: Performed by: NURSE PRACTITIONER

## 2017-08-31 PROCEDURE — 82962 GLUCOSE BLOOD TEST: CPT

## 2017-08-31 PROCEDURE — 99999 PR PBB SHADOW E&M-EST. PATIENT-LVL III: CPT | Mod: PBBFAC,,, | Performed by: INTERNAL MEDICINE

## 2017-08-31 PROCEDURE — 99214 OFFICE O/P EST MOD 30 MIN: CPT | Mod: S$GLB,,, | Performed by: INTERNAL MEDICINE

## 2017-08-31 PROCEDURE — 99220 PR INITIAL OBSERVATION CARE,LEVL III: CPT | Mod: ,,, | Performed by: NURSE PRACTITIONER

## 2017-08-31 PROCEDURE — 93010 ELECTROCARDIOGRAM REPORT: CPT | Mod: ,,, | Performed by: INTERNAL MEDICINE

## 2017-08-31 PROCEDURE — 93005 ELECTROCARDIOGRAM TRACING: CPT | Mod: S$GLB,,, | Performed by: INTERNAL MEDICINE

## 2017-08-31 PROCEDURE — 99285 EMERGENCY DEPT VISIT HI MDM: CPT | Mod: ,,, | Performed by: EMERGENCY MEDICINE

## 2017-08-31 RX ORDER — SODIUM CHLORIDE 9 MG/ML
INJECTION, SOLUTION INTRAVENOUS CONTINUOUS
Status: DISCONTINUED | OUTPATIENT
Start: 2017-08-31 | End: 2017-09-01

## 2017-08-31 RX ORDER — ACETAMINOPHEN 325 MG/1
650 TABLET ORAL EVERY 6 HOURS PRN
Status: DISCONTINUED | OUTPATIENT
Start: 2017-08-31 | End: 2017-09-02 | Stop reason: HOSPADM

## 2017-08-31 RX ORDER — ONDANSETRON 2 MG/ML
4 INJECTION INTRAMUSCULAR; INTRAVENOUS EVERY 12 HOURS PRN
Status: DISCONTINUED | OUTPATIENT
Start: 2017-08-31 | End: 2017-09-02 | Stop reason: HOSPADM

## 2017-08-31 RX ORDER — SODIUM CHLORIDE 0.9 % (FLUSH) 0.9 %
3 SYRINGE (ML) INJECTION EVERY 8 HOURS
Status: DISCONTINUED | OUTPATIENT
Start: 2017-08-31 | End: 2017-09-02 | Stop reason: HOSPADM

## 2017-08-31 RX ORDER — POTASSIUM CHLORIDE 20 MEQ/15ML
20 SOLUTION ORAL ONCE
Status: COMPLETED | OUTPATIENT
Start: 2017-08-31 | End: 2017-08-31

## 2017-08-31 RX ORDER — IBUPROFEN 200 MG
24 TABLET ORAL
Status: DISCONTINUED | OUTPATIENT
Start: 2017-08-31 | End: 2017-09-02 | Stop reason: HOSPADM

## 2017-08-31 RX ORDER — INSULIN ASPART 100 [IU]/ML
0-5 INJECTION, SOLUTION INTRAVENOUS; SUBCUTANEOUS
Status: DISCONTINUED | OUTPATIENT
Start: 2017-08-31 | End: 2017-09-02 | Stop reason: HOSPADM

## 2017-08-31 RX ORDER — GLUCAGON 1 MG
1 KIT INJECTION
Status: DISCONTINUED | OUTPATIENT
Start: 2017-08-31 | End: 2017-09-02 | Stop reason: HOSPADM

## 2017-08-31 RX ORDER — HEPARIN SODIUM 5000 [USP'U]/ML
5000 INJECTION, SOLUTION INTRAVENOUS; SUBCUTANEOUS EVERY 8 HOURS
Status: DISCONTINUED | OUTPATIENT
Start: 2017-08-31 | End: 2017-09-02 | Stop reason: HOSPADM

## 2017-08-31 RX ORDER — AMOXICILLIN 250 MG
1 CAPSULE ORAL 2 TIMES DAILY PRN
Status: DISCONTINUED | OUTPATIENT
Start: 2017-08-31 | End: 2017-09-02 | Stop reason: HOSPADM

## 2017-08-31 RX ORDER — IBUPROFEN 200 MG
16 TABLET ORAL
Status: DISCONTINUED | OUTPATIENT
Start: 2017-08-31 | End: 2017-09-02 | Stop reason: HOSPADM

## 2017-08-31 RX ORDER — DONEPEZIL HYDROCHLORIDE 10 MG/1
10 TABLET, FILM COATED ORAL DAILY
Status: DISCONTINUED | OUTPATIENT
Start: 2017-09-01 | End: 2017-09-02 | Stop reason: HOSPADM

## 2017-08-31 RX ORDER — ROSUVASTATIN CALCIUM 20 MG/1
20 TABLET, COATED ORAL DAILY
Status: DISCONTINUED | OUTPATIENT
Start: 2017-09-01 | End: 2017-09-02 | Stop reason: HOSPADM

## 2017-08-31 RX ORDER — NIFEDIPINE 60 MG/1
60 TABLET, EXTENDED RELEASE ORAL DAILY
Status: DISCONTINUED | OUTPATIENT
Start: 2017-09-01 | End: 2017-09-02 | Stop reason: HOSPADM

## 2017-08-31 RX ORDER — ONDANSETRON 8 MG/1
8 TABLET, ORALLY DISINTEGRATING ORAL EVERY 8 HOURS PRN
Status: DISCONTINUED | OUTPATIENT
Start: 2017-08-31 | End: 2017-09-02 | Stop reason: HOSPADM

## 2017-08-31 RX ADMIN — SODIUM CHLORIDE: 0.9 INJECTION, SOLUTION INTRAVENOUS at 09:08

## 2017-08-31 RX ADMIN — HEPARIN SODIUM 5000 UNITS: 5000 INJECTION, SOLUTION INTRAVENOUS; SUBCUTANEOUS at 10:08

## 2017-08-31 RX ADMIN — POTASSIUM CHLORIDE 20 MEQ: 20 SOLUTION ORAL at 08:08

## 2017-08-31 RX ADMIN — SODIUM CHLORIDE 500 ML: 0.9 INJECTION, SOLUTION INTRAVENOUS at 05:08

## 2017-08-31 RX ADMIN — CEFTRIAXONE 1 G: 1 INJECTION, SOLUTION INTRAVENOUS at 08:08

## 2017-08-31 NOTE — TELEPHONE ENCOUNTER
Reviewed labs.  Pt with decreased appetite, tachy, elevated sodium.  Will need fluids.  MA to call to go to the ED for evaluation.

## 2017-08-31 NOTE — TELEPHONE ENCOUNTER
Spoke with Naomi- patients daughter, informed her that Ms. Pelletier needs to be seen in ED. Daughter agreed to bring her.

## 2017-08-31 NOTE — ED NOTES
Pt brought to ED by daughter d/t pt weak and hasn't been eating the past week, pt lives with daughter, normally independent. Had labwork done today and was referred to ED after results were in. Pt denies any complaints, appears frail and fatigued. Denies dysuria, no fever/chills.

## 2017-08-31 NOTE — PROGRESS NOTES
Subjective:       Patient ID: Delfina Pelletier is a 88 y.o. female.    Chief Complaint: Anorexia    The patient has been brought in by her daughter today for evaluation of decreased appetite.  She reports that she has not been eating over the past week.  She have restrictive plenty of water, soda and eating ice cream and popsicles.  She however is not eating meals.  The patient denies any symptoms to her daughter.  She denies that she is hurting.  She does not feel hungry.  She is having bowel movements every other day.  She does have frequent urination.  There've been no changes in the types of food that she is apparent for her mother.  She denies any choking or difficulty swallowing.      Review of Systems   Constitutional: Positive for appetite change and unexpected weight change. Negative for activity change and fever.   HENT: Negative for trouble swallowing.    Respiratory: Negative for shortness of breath.    Cardiovascular: Negative for chest pain.   Gastrointestinal: Negative for abdominal pain, constipation, diarrhea, nausea and vomiting.   Musculoskeletal: Negative for arthralgias.       Objective:      Physical Exam   Constitutional: She is oriented to person, place, and time. She appears well-developed and well-nourished. No distress.   HENT:   Head: Normocephalic and atraumatic.   Right Ear: External ear and ear canal normal.   Left Ear: External ear and ear canal normal.   Nose: Nose normal. No mucosal edema.   Mouth/Throat: Oropharynx is clear and moist. No oropharyngeal exudate.   Bilateral cerumen impaction   Eyes: Conjunctivae and EOM are normal. Pupils are equal, round, and reactive to light. No scleral icterus.   Neck: Neck supple.   Cardiovascular: Regular rhythm and normal heart sounds.  Tachycardia present.  Exam reveals no gallop and no friction rub.    No murmur heard.  Pulmonary/Chest: Effort normal and breath sounds normal. No stridor. No respiratory distress. She has no wheezes. She has no  rales.   Abdominal: Soft. Bowel sounds are normal. She exhibits no distension and no mass. There is no tenderness. There is no rebound and no guarding.   Musculoskeletal: She exhibits no edema or tenderness.   Lymphadenopathy:     She has no cervical adenopathy.   Neurological: She is alert and oriented to person, place, and time. No cranial nerve deficit.   Skin: Skin is warm and dry. No rash noted.   Psychiatric: She has a normal mood and affect.   Vitals reviewed.      Assessment:       1. Loss of appetite    2. Weight loss    3. Tachycardia        Plan:       Delfina was seen today for anorexia.    Diagnoses and all orders for this visit:    Loss of appetite - her daughter complains of a one-week history of decreased appetite.  She have a history and complaint fluids.  Labs as below.  Rule out urinary tract infection.  None noted constipation.  -     CBC auto differential; Future  -     Comprehensive metabolic panel; Future  -     Urinalysis; Future  -     Urine culture; Future  -     TSH; Future  -     Magnesium; Future    Weight loss - weight is down by 14 pounds over the past 3 months.  We'll follow-up her labs.  Would avoid aggressive workup in this 88-year-old female if persistent.        -     CBC auto differential; Future  -     Comprehensive metabolic panel; Future  -     Urinalysis; Future  -     Urine culture; Future  -     TSH; Future  -     Magnesium; Future    Tachycardia - sinus rhythm by EKG.  She feels well otherwise.  No significant changes when compared to previous EKGs.  I will follow-up her labs.    -     EKG 12-lead  -     CBC auto differential; Future  -     TSH; Future        follow-up after labs are reviewed

## 2017-09-01 PROBLEM — E87.6 HYPOKALEMIA: Status: RESOLVED | Noted: 2017-08-31 | Resolved: 2017-09-01

## 2017-09-01 LAB
ALBUMIN SERPL BCP-MCNC: 2.7 G/DL
ALP SERPL-CCNC: 71 U/L
ALT SERPL W/O P-5'-P-CCNC: 7 U/L
ANION GAP SERPL CALC-SCNC: 7 MMOL/L
ANION GAP SERPL CALC-SCNC: 7 MMOL/L
ANION GAP SERPL CALC-SCNC: 8 MMOL/L
AST SERPL-CCNC: 15 U/L
BASOPHILS # BLD AUTO: 0.04 K/UL
BASOPHILS NFR BLD: 0.5 %
BILIRUB SERPL-MCNC: 0.3 MG/DL
BUN SERPL-MCNC: 17 MG/DL
BUN SERPL-MCNC: 20 MG/DL
BUN SERPL-MCNC: 20 MG/DL
CALCIUM SERPL-MCNC: 8.4 MG/DL
CALCIUM SERPL-MCNC: 8.5 MG/DL
CALCIUM SERPL-MCNC: 8.5 MG/DL
CHLORIDE SERPL-SCNC: 115 MMOL/L
CHLORIDE SERPL-SCNC: 116 MMOL/L
CHLORIDE SERPL-SCNC: 116 MMOL/L
CO2 SERPL-SCNC: 24 MMOL/L
CO2 SERPL-SCNC: 26 MMOL/L
CO2 SERPL-SCNC: 26 MMOL/L
CREAT SERPL-MCNC: 1 MG/DL
CREAT SERPL-MCNC: 1.1 MG/DL
CREAT SERPL-MCNC: 1.1 MG/DL
DIFFERENTIAL METHOD: ABNORMAL
EOSINOPHIL # BLD AUTO: 0.1 K/UL
EOSINOPHIL NFR BLD: 0.6 %
ERYTHROCYTE [DISTWIDTH] IN BLOOD BY AUTOMATED COUNT: 13.9 %
EST. GFR  (AFRICAN AMERICAN): 51.8 ML/MIN/1.73 M^2
EST. GFR  (AFRICAN AMERICAN): 51.8 ML/MIN/1.73 M^2
EST. GFR  (AFRICAN AMERICAN): 58.1 ML/MIN/1.73 M^2
EST. GFR  (NON AFRICAN AMERICAN): 44.9 ML/MIN/1.73 M^2
EST. GFR  (NON AFRICAN AMERICAN): 44.9 ML/MIN/1.73 M^2
EST. GFR  (NON AFRICAN AMERICAN): 50.4 ML/MIN/1.73 M^2
ESTIMATED AVG GLUCOSE: 100 MG/DL
GLUCOSE SERPL-MCNC: 116 MG/DL
GLUCOSE SERPL-MCNC: 116 MG/DL
GLUCOSE SERPL-MCNC: 157 MG/DL
HBA1C MFR BLD HPLC: 5.1 %
HCT VFR BLD AUTO: 36.5 %
HGB BLD-MCNC: 11.2 G/DL
INR PPP: 1
LYMPHOCYTES # BLD AUTO: 2.2 K/UL
LYMPHOCYTES NFR BLD: 25.7 %
MAGNESIUM SERPL-MCNC: 2 MG/DL
MCH RBC QN AUTO: 27.1 PG
MCHC RBC AUTO-ENTMCNC: 30.7 G/DL
MCV RBC AUTO: 88 FL
MONOCYTES # BLD AUTO: 0.8 K/UL
MONOCYTES NFR BLD: 10 %
NEUTROPHILS # BLD AUTO: 5.3 K/UL
NEUTROPHILS NFR BLD: 63.2 %
PHOSPHATE SERPL-MCNC: 2.4 MG/DL
PLATELET # BLD AUTO: 207 K/UL
PMV BLD AUTO: 13.1 FL
POCT GLUCOSE: 136 MG/DL (ref 70–110)
POCT GLUCOSE: 187 MG/DL (ref 70–110)
POCT GLUCOSE: 208 MG/DL (ref 70–110)
POCT GLUCOSE: 228 MG/DL (ref 70–110)
POTASSIUM SERPL-SCNC: 3.2 MMOL/L
POTASSIUM SERPL-SCNC: 3.2 MMOL/L
POTASSIUM SERPL-SCNC: 3.6 MMOL/L
PREALB SERPL-MCNC: 14 MG/DL
PROT SERPL-MCNC: 6.5 G/DL
PROTHROMBIN TIME: 10.5 SEC
RBC # BLD AUTO: 4.14 M/UL
SODIUM SERPL-SCNC: 147 MMOL/L
SODIUM SERPL-SCNC: 149 MMOL/L
SODIUM SERPL-SCNC: 149 MMOL/L
WBC # BLD AUTO: 8.44 K/UL

## 2017-09-01 PROCEDURE — G8980 MOBILITY D/C STATUS: HCPCS | Mod: CK

## 2017-09-01 PROCEDURE — 63600175 PHARM REV CODE 636 W HCPCS: Performed by: NURSE PRACTITIONER

## 2017-09-01 PROCEDURE — 25000003 PHARM REV CODE 250: Performed by: NURSE PRACTITIONER

## 2017-09-01 PROCEDURE — 97116 GAIT TRAINING THERAPY: CPT

## 2017-09-01 PROCEDURE — 80048 BASIC METABOLIC PNL TOTAL CA: CPT

## 2017-09-01 PROCEDURE — G8987 SELF CARE CURRENT STATUS: HCPCS | Mod: CL

## 2017-09-01 PROCEDURE — 84100 ASSAY OF PHOSPHORUS: CPT

## 2017-09-01 PROCEDURE — 97165 OT EVAL LOW COMPLEX 30 MIN: CPT

## 2017-09-01 PROCEDURE — G8978 MOBILITY CURRENT STATUS: HCPCS | Mod: CK

## 2017-09-01 PROCEDURE — 83036 HEMOGLOBIN GLYCOSYLATED A1C: CPT

## 2017-09-01 PROCEDURE — 25000003 PHARM REV CODE 250: Performed by: HOSPITALIST

## 2017-09-01 PROCEDURE — 63600175 PHARM REV CODE 636 W HCPCS: Performed by: HOSPITALIST

## 2017-09-01 PROCEDURE — 84134 ASSAY OF PREALBUMIN: CPT

## 2017-09-01 PROCEDURE — G0378 HOSPITAL OBSERVATION PER HR: HCPCS

## 2017-09-01 PROCEDURE — A4216 STERILE WATER/SALINE, 10 ML: HCPCS | Performed by: HOSPITALIST

## 2017-09-01 PROCEDURE — 36415 COLL VENOUS BLD VENIPUNCTURE: CPT

## 2017-09-01 PROCEDURE — 99226 PR SUBSEQUENT OBSERVATION CARE,LEVEL III: CPT | Mod: ,,, | Performed by: PHYSICIAN ASSISTANT

## 2017-09-01 PROCEDURE — 97161 PT EVAL LOW COMPLEX 20 MIN: CPT

## 2017-09-01 PROCEDURE — 25000003 PHARM REV CODE 250: Performed by: PHYSICIAN ASSISTANT

## 2017-09-01 PROCEDURE — G8988 SELF CARE GOAL STATUS: HCPCS | Mod: CK

## 2017-09-01 PROCEDURE — 80053 COMPREHEN METABOLIC PANEL: CPT

## 2017-09-01 PROCEDURE — 85610 PROTHROMBIN TIME: CPT

## 2017-09-01 PROCEDURE — 83735 ASSAY OF MAGNESIUM: CPT

## 2017-09-01 PROCEDURE — 85025 COMPLETE CBC W/AUTO DIFF WBC: CPT

## 2017-09-01 RX ORDER — DEXTROSE MONOHYDRATE, SODIUM CHLORIDE, AND POTASSIUM CHLORIDE 50; 1.49; 9 G/1000ML; G/1000ML; G/1000ML
1000 INJECTION, SOLUTION INTRAVENOUS CONTINUOUS
Status: DISCONTINUED | OUTPATIENT
Start: 2017-09-01 | End: 2017-09-01

## 2017-09-01 RX ORDER — DEXTROSE MONOHYDRATE, SODIUM CHLORIDE, AND POTASSIUM CHLORIDE 50; 1.49; 4.5 G/1000ML; G/1000ML; G/1000ML
1000 INJECTION, SOLUTION INTRAVENOUS CONTINUOUS
Status: DISCONTINUED | OUTPATIENT
Start: 2017-09-01 | End: 2017-09-02

## 2017-09-01 RX ORDER — SODIUM,POTASSIUM PHOSPHATES 280-250MG
1 POWDER IN PACKET (EA) ORAL
Status: DISCONTINUED | OUTPATIENT
Start: 2017-09-01 | End: 2017-09-02 | Stop reason: HOSPADM

## 2017-09-01 RX ADMIN — Medication 3 ML: at 06:09

## 2017-09-01 RX ADMIN — ROSUVASTATIN CALCIUM 20 MG: 20 TABLET, FILM COATED ORAL at 09:09

## 2017-09-01 RX ADMIN — HEPARIN SODIUM 5000 UNITS: 5000 INJECTION, SOLUTION INTRAVENOUS; SUBCUTANEOUS at 09:09

## 2017-09-01 RX ADMIN — Medication 3 ML: at 10:09

## 2017-09-01 RX ADMIN — POTASSIUM & SODIUM PHOSPHATES POWDER PACK 280-160-250 MG 1 PACKET: 280-160-250 PACK at 12:09

## 2017-09-01 RX ADMIN — INSULIN ASPART 2 UNITS: 100 INJECTION, SOLUTION INTRAVENOUS; SUBCUTANEOUS at 05:09

## 2017-09-01 RX ADMIN — POTASSIUM & SODIUM PHOSPHATES POWDER PACK 280-160-250 MG 1 PACKET: 280-160-250 PACK at 09:09

## 2017-09-01 RX ADMIN — SODIUM CHLORIDE: 0.9 INJECTION, SOLUTION INTRAVENOUS at 06:09

## 2017-09-01 RX ADMIN — DONEPEZIL HYDROCHLORIDE 10 MG: 10 TABLET, FILM COATED ORAL at 09:09

## 2017-09-01 RX ADMIN — HEPARIN SODIUM 5000 UNITS: 5000 INJECTION, SOLUTION INTRAVENOUS; SUBCUTANEOUS at 05:09

## 2017-09-01 RX ADMIN — INSULIN ASPART 1 UNITS: 100 INJECTION, SOLUTION INTRAVENOUS; SUBCUTANEOUS at 09:09

## 2017-09-01 RX ADMIN — DEXTROSE MONOHYDRATE, SODIUM CHLORIDE, AND POTASSIUM CHLORIDE 1000 ML: 50; 4.5; 1.49 INJECTION, SOLUTION INTRAVENOUS at 09:09

## 2017-09-01 RX ADMIN — POTASSIUM & SODIUM PHOSPHATES POWDER PACK 280-160-250 MG 1 PACKET: 280-160-250 PACK at 05:09

## 2017-09-01 RX ADMIN — CEFTRIAXONE 1 G: 1 INJECTION, SOLUTION INTRAVENOUS at 09:09

## 2017-09-01 RX ADMIN — HEPARIN SODIUM 5000 UNITS: 5000 INJECTION, SOLUTION INTRAVENOUS; SUBCUTANEOUS at 02:09

## 2017-09-01 RX ADMIN — DEXTROSE MONOHYDRATE, SODIUM CHLORIDE, AND POTASSIUM CHLORIDE 1000 ML: 50; 4.5; 1.49 INJECTION, SOLUTION INTRAVENOUS at 05:09

## 2017-09-01 RX ADMIN — NIFEDIPINE 60 MG: 60 TABLET, FILM COATED, EXTENDED RELEASE ORAL at 09:09

## 2017-09-01 NOTE — PLAN OF CARE
MARIA C was informed by the PA that the pt will likely dc tomorrow.  MARIA C sent the referral to [facility] through [facility].      Maggie Moser, ERNESTINA  [id number]

## 2017-09-01 NOTE — ASSESSMENT & PLAN NOTE
- concern for worsening dementia   - continue donepezil as prescribed  - high risk fall / injury - utilize all safety measures and fall precautions

## 2017-09-01 NOTE — ASSESSMENT & PLAN NOTE
- potassium 3.1 with initial labs  - 20 mEq KCL x`1  - magnesium 2.4  - f/u on repeat potassium level and replete as needed  - maintain on telemetry

## 2017-09-01 NOTE — PLAN OF CARE
Problem: Occupational Therapy Goal  Goal: Occupational Therapy Goal  Goals to be met by: 9/15/17     Patient will increase functional independence with ADLs by performing:    UE Dressing with Minimal Assistance.  LE Dressing with Minimal Assistance.  Grooming while standing at sink with Contact Guard Assistance.  Toileting from toilet with Stand-by Assistance for hygiene and clothing management.   Stand pivot transfers with Stand-by Assistance.  Toilet transfer to toilet with Stand-by Assistance.    Outcome: Ongoing (interventions implemented as appropriate)  OT eval completed. The above goals are established to improve functional (I) and mobility.    ZAHRAA Ayala  9/1/2017  Pager: [phone]

## 2017-09-01 NOTE — PLAN OF CARE
[facility]    HOME HEALTH ORDERS  FACE TO FACE ENCOUNTER    Patient Name: Delfina Pelletier  YOB: 1928    PCP: Adi Gutiérrez MD   PCP Address: [id number]  PCP Phone Number: [phone]  PCP Fax: [phone]    Encounter Date: 09/01/2017    Admit to Home Health    Diagnoses:  Active Hospital Problems    Diagnosis  POA    *Dehydration [E86.0]  Yes    Anorexia [R63.0]  Yes    Dementia without behavioral disturbance [F03.90]  Yes    Type 2 diabetes mellitus without complication, without long-term current use of insulin [E11.9]  Yes     Chronic    Essential hypertension [I10]  Yes    Weight loss [R63.4]  Yes    UTI (urinary tract infection) [N39.0]  Yes      Resolved Hospital Problems    Diagnosis Date Resolved POA    Hypokalemia [E87.6] 09/01/2017 Unknown       Future Appointments  Date Time Provider Department Center   9/6/2017 10:40 AM Adi Gutiérrez MD [facility]     Follow-up Information     Adi Gutiérrez MD On 9/6/2017.    Specialty:  Internal Medicine  Why:  at 10:40 AM  Contact information:  [address] [phone]                     I have seen and examined this patient face to face today. My clinical findings that support the need for the home health skilled services and home bound status are the following:  Weakness/numbness causing balance and gait disturbance due to Weakness/Debility making it taxing to leave home.    Allergies:Review of patient's allergies indicates:  No Known Allergies    Diet: regular diet    Activities: activity as tolerated    Nursing:   SN to complete comprehensive assessment including routine vital signs. Instruct on disease process and s/s of complications to report to MD. Review/verify medication list sent home with the patient at time of discharge  and instruct patient/caregiver as needed. Frequency may be adjusted depending on start of care date.    Notify MD if SBP >  160 or < 90; DBP > 90 or < 50; HR > 120 or < 50; Temp > 101; Other:         CONSULTS:    Physical Therapy to evaluate and treat. Evaluate for home safety and equipment needs; Establish/upgrade home exercise program. Perform / instruct on therapeutic exercises, gait training, transfer training, and Range of Motion.  Occupational Therapy to evaluate and treat. Evaluate home environment for safety and equipment needs. Perform/Instruct on transfers, ADL training, ROM, and therapeutic exercises.   to evaluate for community resources/long-range planning.  Aide to provide assistance with personal care, ADLs, and vital signs.    MISCELLANEOUS CARE:  N/A    WOUND CARE ORDERS  n/a      Medications: Review discharge medications with patient and family and provide education.      Current Discharge Medication List      CONTINUE these medications which have NOT CHANGED    Details   blood sugar diagnostic Strp 1 each by Misc.(Non-Drug; Combo Route) route once daily. True Metrix Glucometer Test Strips  Test blood sugar once daily  Qty: 100 each, Refills: 1    Associated Diagnoses: Type 2 diabetes mellitus without complication, unspecified long term insulin use status      blood-glucose meter kit Use as instructed  Qty: 1 each, Refills: 0    Comments: Or any brand covered by insurance and strips/lancets to match.  Associated Diagnoses: Type 2 diabetes mellitus without complication      docusate sodium (STOOL SOFTENER) 250 MG capsule Take 1 capsule (250 mg total) by mouth once daily.    Associated Diagnoses: Constipation, unspecified constipation type      donepezil (ARICEPT) 10 MG tablet Take 1 tablet (10 mg total) by mouth once daily.  Qty: 90 tablet, Refills: 1    Associated Diagnoses: Dementia without behavioral disturbance, unspecified dementia type      lancets Misc 1 each by Misc.(Non-Drug; Combo Route) route once daily. TRUE METRIX METER  Qty: 100 each, Refills: 1    Associated Diagnoses: Type 2 diabetes mellitus  without complication, unspecified long term insulin use status      metformin (GLUCOPHAGE) 500 MG tablet Take 1 tablet (500 mg total) by mouth 2 (two) times daily with meals.  Qty: 180 tablet, Refills: 1    Associated Diagnoses: Type 2 diabetes mellitus without complication, without long-term current use of insulin      nifedipine (NIFEDICAL XL) 60 MG (OSM) 24 hr tablet Take 1 tablet (60 mg total) by mouth once daily.  Qty: 90 tablet, Refills: 1    Associated Diagnoses: Hypertension associated with diabetes      polyethylene glycol (MIRALAX) 17 gram/dose powder Take 17 g by mouth once daily.  Qty: 238 g, Refills: 0    Associated Diagnoses: Constipation, unspecified constipation type      rosuvastatin (CRESTOR) 20 MG tablet Take 1 tablet (20 mg total) by mouth once daily.  Qty: 90 tablet, Refills: 1    Associated Diagnoses: Combined hyperlipidemia associated with type 2 diabetes mellitus             I certify that this patient is confined to her home and needs physical therapy and occupational therapy.

## 2017-09-01 NOTE — DISCHARGE INSTRUCTIONS
[facility]: [phone], They will arrange home health for the patient.  The Home health will call the patient after discharge.

## 2017-09-01 NOTE — PLAN OF CARE
Problem: Physical Therapy Goal  Goal: Physical Therapy Goal  Goals to be met by: 17     Patient will increase functional independence with mobility by performin. Supine to sit with Set-up [place]  2. Sit to supine with Set-up [place]  3. Sit to stand transfer with Supervision  4. Bed to chair transfer with Stand-by Assistance using Rolling Walker  5. Gait  x 80 feet with Stand-by Assistance using Rolling Walker.   6. Lower extremity exercise program x15 reps per handout, with supervision    Outcome: Ongoing (interventions implemented as appropriate)  PT Goals established following initial eval    2017  Edgard Cohen, PT

## 2017-09-01 NOTE — ED PROVIDER NOTES
Encounter Date: 8/31/2017       History     Chief Complaint   Patient presents with    Dehydration     had lab drawn today and dr called and said to come to nearest [facility] because dehydrated     HPI   This is an 88-year-old female past medical history significant for dementia, non-insulin-dependent type 2 diabetes mellitus, diabetic neuropathy, hypertension, hyperlipidemia and tuberculosis who was brought into the emergency department today by her family members.  Patient lives at home.  Family members state that her appetite has decreased drastically over the course the past 2 weeks.  She used to have a healthy appetite and now is barely eating or drinking anything at home.  The family brought her to her primary care provider this morning where she had outpatient labs done.  They were called back with a report of an elevated creatinine of 1.6 (up from baseline of 1.0) and a BUN of 28.  Patient denies any pain.  Family denies fever.  Denies vomiting or diarrhea.  States bowel movements have been normal and she is having normal urinary output.  They state that the patient has a history of urinary incontinence but occasionally is able to use the toilet.  Lately she has been making it to the toilet less frequently.  Patient denies dysuria.  Denies chest pain or shortness of breath.  They note a general decrease in her activity level.      Review of patient's allergies indicates:  No Known Allergies  Past Medical History:   Diagnosis Date    Diabetes mellitus     Diabetes mellitus type II     Diabetic neuropathy     Hyperlipidemia     Hypertension     Tuberculosis      Past Surgical History:   Procedure Laterality Date    left lung lobectomy for TB      removed at 20yrs old     Family History   Problem Relation Age of Onset    Hypertension Mother     Diabetes Mother     Diabetes Sister     Arthritis Brother     Hypertension Daughter     Diabetes Daughter     Diabetes Son     Hypertension Sister      Diabetes Daughter     Blindness Neg Hx     Glaucoma Neg Hx     Macular degeneration Neg Hx     Retinal detachment Neg Hx      Social History   Substance Use Topics    Smoking status: Former Smoker    Smokeless tobacco: Never Used      Comment: quit smoking over 30+ years    Alcohol use No     Review of Systems   Limited review of systems secondary to patient condition.    Constitutional: Negative for chills and fever.  positive for fatigue.  Respiratory: Negative for cough or shortness of breath.    Cardiovascular: Negative for chest pain.   Gastrointestinal: Negative for abdominal pain, diarrhea, nausea and vomiting.  positive for diminished appetite.  Genitourinary: Negative for dysuria.  Skin: Negative for rash.  Neurological: Positive for general weakness.  Negative for focal weakness.    Psychiatric/Behavioral: Positive for confusion (history of dementia) .       Physical Exam     Initial Vitals [08/31/17 1613]   BP Pulse Resp Temp SpO2   107/71 100 18 99.9 °F (37.7 °C) 98 %      MAP       83         Physical Exam   Nursing note and vitals reviewed.  Constitutional:  Patient appears frail and dehydrated.    HENT:   Head: Normocephalic and atraumatic.   Eyes: Conjunctivae are dry. No scleral icterus.   Neck: Normal range of motion. Neck supple.   Cardiovascular:  Irregularly irregular heart rhythm is noted.  Normal rate.  No edema.  No calf tenderness.    Pulmonary/Chest: Breath sounds normal. No respiratory distress.  Abdominal:Abdomen is soft and nondistended.  No tenderness to palpation.  No rebound or guarding.  NMusculoskeletal: Normal range of motion. No edema or tenderness.   Neurological: Generalized weakness.   Skin: No erythema. No pallor.     ED Course   Procedures  Labs Reviewed   CBC W/ AUTO DIFFERENTIAL - Abnormal; Notable for the following:        Result Value    MCHC 31.5 (*)     Lymph% 17.2 (*)     All other components within normal limits   COMPREHENSIVE METABOLIC PANEL - Abnormal;  Notable for the following:     Sodium 147 (*)     Potassium 3.1 (*)     Chloride 113 (*)     Glucose 126 (*)     BUN, Bld 30 (*)     Creatinine 1.5 (*)     Albumin 3.1 (*)     ALT 8 (*)     eGFR if  35.6 (*)     eGFR if non  30.9 (*)     All other components within normal limits   URINALYSIS, REFLEX TO URINE CULTURE - Abnormal; Notable for the following:     Appearance, UA Cloudy (*)     Protein, UA 2+ (*)     Ketones, UA Trace (*)     Occult Blood UA 2+ (*)     Leukocytes, UA 1+ (*)     All other components within normal limits    Narrative:     Preferred Collection Type->Urine, Catheterized   URINALYSIS MICROSCOPIC - Abnormal; Notable for the following:     RBC, UA >100 (*)     WBC, UA 50 (*)     Bacteria, UA Many (*)     Amorphous, UA Many (*)     All other components within normal limits    Narrative:     Preferred Collection Type->Urine, Catheterized   POCT GLUCOSE - Abnormal; Notable for the following:     POCT Glucose 122 (*)     All other components within normal limits   CULTURE, URINE   POCT GLUCOSE MONITORING CONTINUOUS             Medical Decision Making:   Initial Assessment:   This is an 88-year-old female brought into the emergency department by her family members at the request of her primary care provider for dehydration and an elevated creatinine found on outpatient labs today.  Patient appears frail and dehydrated.  She has a history of dementia.  Denies any complaints.  We will give IV fluids and repeat labs.  Plan to admit to internal medicine for further evaluation treatment.                   ED Course     Clinical Impression:   The primary encounter diagnosis was Dehydration. A diagnosis of Anorexia was also pertinent to this visit.                           Lauryn Singh NP  08/31/17 1928

## 2017-09-01 NOTE — ED NOTES
Pt provided with meal tray. Pt showing no interest in food. RN asked family if there is any particular food patient shows interest in at home and family reports patient shows no interest nor eats regularly at home. Pt offered juice and declined.

## 2017-09-01 NOTE — ED NOTES
Patient on stretcher, Bed placed in low/locked position, side rails up x 2, call light is within reach of patient and family. Family updated on status of room assignment, Patient placed into position of comfort. Patient in NAD and offers no complaints at this time. Will continue to monitor.

## 2017-09-01 NOTE — HPI
87 y/o female, who was referred to the ED by her PCP d/t abnormal lab results.  Her daughter took her to see Dr. Gutiérrez today d/t recent dietary changes and weight loss.  She reports over the past two weeks she has barely eaten anything.  She states that she will drink small quantities of water, soft drinks, popsicles, and ice cream, but for the most part has been refusing food.  Dr. Gutiérrez sent her for outpatient labs which revealed a creatinine of 1.6 which was up from her baseline of 1.0, and she was instructed to present to her nearest ED.  She has a PMH of L pneumonectomy, dementia, non-insulin-dependent type 2 diabetes mellitus, diabetic neuropathy, hypertension, hyperlipidemia and tuberculosis. She appears frail and withdrawn.  She denies discomfort, recent illness, fever, chills, CP, SOB, or N/V/D. She has a history of occasional urinary incontinence, but her daughter reports that episodes of incontinence have increased.  UA performed in the ED was suspicious for UTI and she was started on ceftriaxone.

## 2017-09-01 NOTE — ASSESSMENT & PLAN NOTE
Weight loss, Anorexia   - possibly r/t worsening dementia vs UTI  - prealbumin pending  - Crt 1.6 with outpatient labs - 1.5 in ED - received 500mls in the ED  - continue gentle hydration overnight and reassess labs in AM   - may want to consider dietary consultation

## 2017-09-01 NOTE — SUBJECTIVE & OBJECTIVE
Interval History: no acute events overnight, no new complaints.  Pt eating bites of breakfast and lunch.  Daughters do not want SNF but agreeable to [facility].    Review of Systems   Constitutional: Positive for appetite change and unexpected weight change. Negative for chills and fever.        Frail appearing    HENT: Negative for congestion.    Eyes: Negative for visual disturbance.   Respiratory: Negative for cough, chest tightness and shortness of breath.    Cardiovascular: Negative for chest pain, palpitations and leg swelling.   Gastrointestinal: Negative for abdominal distention, abdominal pain, diarrhea, nausea and vomiting.   Genitourinary:        Incontinent of urine    Musculoskeletal: Negative for arthralgias and myalgias.   Skin: Negative.  Negative for rash.   Neurological: Negative for dizziness, seizures, syncope and light-headedness.   Psychiatric/Behavioral: Positive for confusion. Negative for agitation, hallucinations and self-injury.     Objective:     Vital Signs (Most Recent):  Temp: 97.5 °F (36.4 °C) (09/01/17 0818)  Pulse: 74 (09/01/17 1500)  Resp: 16 (09/01/17 0818)  BP: (!) 158/69 (09/01/17 0818)  SpO2: 97 % (09/01/17 0818) Vital Signs (24h Range):  Temp:  [97.5 °F (36.4 °C)-99.9 °F (37.7 °C)] 97.5 °F (36.4 °C)  Pulse:  [] 74  Resp:  [16-20] 16  SpO2:  [97 %-99 %] 97 %  BP: (107-166)/(63-72) 158/69     Weight: 47.9 kg (105 lb 9.6 oz)  Body mass index is 19.01 kg/m².    Intake/Output Summary (Last 24 hours) at 09/01/17 1557  Last data filed at 09/01/17 1228   Gross per 24 hour   Intake              460 ml   Output              500 ml   Net              -40 ml      Physical Exam   Constitutional: She appears well-developed. No distress.   HENT:   Head: Normocephalic and atraumatic.   Eyes: EOM are normal. Pupils are equal, round, and reactive to light.   Neck: Normal range of motion. Neck supple.   Cardiovascular: Normal rate, regular rhythm, normal heart sounds and intact distal pulses.     Pulmonary/Chest: Effort normal and breath sounds normal. No respiratory distress.   Abdominal: Soft. Bowel sounds are normal. She exhibits no distension. There is no tenderness.   Musculoskeletal: Normal range of motion. She exhibits no edema.   Neurological: She is alert.   Speech is minimal but appropriate, follows simple commands   Skin: Skin is warm and dry. She is not diaphoretic.   Psychiatric: She has a normal mood and affect. She is withdrawn.   Nursing note and vitals reviewed.      Significant Labs: All pertinent labs within the past 24 hours have been reviewed.    Significant Imaging: I have reviewed all pertinent imaging results/findings within the past 24 hours.

## 2017-09-01 NOTE — PLAN OF CARE
Problem: Patient Care Overview  Goal: Plan of Care Review  Outcome: Ongoing (interventions implemented as appropriate)  Pt is alert and pleasantly confused. POC reviewed with the pt and family. Denies any pain or discomfort., not in any distress. Safety precautions maintained. Daughter at bedside.

## 2017-09-01 NOTE — MEDICAL/APP STUDENT
[facility] Hospital Medicine  Progress Note    Patient Name: Delfina Pelletier  MRN: [id number]  Patient Class: OP- Observation   Admission Date: 8/31/2017  Length of Stay: 0 days  Attending Physician: Dilip Braxton MD  Primary Care Provider: Adi Gutiérrez MD    [facility] Medicine Team: [facility] CANDIDA MurrayS    Subjective:     Principal Problem:Dehydration    HPI: 89 y/o female, who was referred to the ED by her PCP for abnormal lab results.  Her daughter took her to see Dr. Gutiérrez yesterday due to recent dietary changes and weight loss.  She reports over the past two weeks she has barely eaten anything.  She states that she will drink small quantities of water, soft drinks, popsicles, and ice cream, but for the most part has been refusing food.  Dr. Gutiérrez sent her for outpatient labs which revealed a creatinine of 1.6 which was up from her baseline of 1.0. She has a PMH of L pneumonectomy, dementia, non-insulin-dependent type 2 diabetes mellitus, diabetic neuropathy, hypertension, hyperlipidemia and tuberculosis. She appears frail and withdrawn.  She denies discomfort, recent illness, fever, chills, CP, SOB, or N/V/D. She has a history of occasional urinary incontinence, but her daughter reports that episodes of incontinence have increased. Daughter also reports noticing increased weakness in past several days but denies changes to baseline mental status. UA performed in the ED was suspicious for UTI and she was started on ceftriaxone.    Hospital Course: Pt admitted to observation for dehydration. UA showed 1+ leukocytes, many bacteria, 50 WBCs, >100 RBCs. Pt started on ceftriaxone for suspected UTI.     Interval History: No acute events overnight. Daughter at bedside. No new complaints this morning.    Review of Systems   Constitutional: Positive for appetite change and unexpected weight change. Negative for diaphoresis and fever.   HENT: Negative.    Respiratory: Negative for  cough, choking and shortness of breath.    Cardiovascular: Negative for chest pain and leg swelling.   Gastrointestinal: Negative for abdominal distention, abdominal pain, nausea and vomiting.   Genitourinary: Negative for difficulty urinating, dysuria and frequency.        Incontinence   Musculoskeletal: Negative for back pain and myalgias.   Neurological: Positive for weakness. Negative for dizziness, tremors, syncope and headaches.   Psychiatric/Behavioral: Positive for confusion. Negative for agitation and behavioral problems.     Objective:     Vital Signs (Most Recent):  Temp: 97.5 °F (36.4 °C) (09/01/17 0818)  Pulse: 69 (09/01/17 1100)  Resp: 16 (09/01/17 0818)  BP: (!) 158/69 (09/01/17 0818)  SpO2: 97 % (09/01/17 0818) Vital Signs (24h Range):  Temp:  [97.5 °F (36.4 °C)-99.9 °F (37.7 °C)] 97.5 °F (36.4 °C)  Pulse:  [] 69  Resp:  [16-20] 16  SpO2:  [97 %-99 %] 97 %  BP: (107-166)/(63-72) 158/69     Weight: 47.9 kg (105 lb 9.6 oz)  Body mass index is 19.01 kg/m².    Intake/Output Summary (Last 24 hours) at 09/01/17 1214  Last data filed at 09/01/17 0950   Gross per 24 hour   Intake              340 ml   Output              500 ml   Net             -160 ml      Physical Exam   Constitutional: She appears well-developed. She is cooperative. No distress.   Appears frail   HENT:   Head: Normocephalic and atraumatic.   Nose: Nose normal.   Mouth/Throat: Mucous membranes are normal.   Eyes: Conjunctivae are normal. Pupils are equal, round, and reactive to light. Right eye exhibits discharge. Left eye exhibits discharge.   Neck: Normal range of motion. Neck supple.   Cardiovascular: Normal rate, regular rhythm, normal heart sounds and intact distal pulses.    No murmur heard.  Pulmonary/Chest: Effort normal and breath sounds normal. She has no wheezes. She has no rales.   Abdominal: Soft. Bowel sounds are normal. She exhibits no distension. There is no tenderness.   Musculoskeletal: She exhibits no edema or  tenderness.   Neurological: She is alert. She is disoriented.   Oriented to person and daughter. Disoriented to time and place   Skin: Skin is warm and dry. She is not diaphoretic. No erythema. No pallor.   Psychiatric: She is withdrawn. Cognition and memory are impaired.   Minimal speech. Would answer yes/no questions       Significant Labs: All pertinent labs within the past 24 hours have been reviewed.    Significant Imaging: I have reviewed all pertinent imaging results/findings within the past 24 hours.    Assessment/Plan:      Dehydration  - possibly r/t worsening dementia vs UTI  - Crt 1.6 with outpatient labs - 1.5 in ED - received 500mls in the ED. Started on IVF @100 mL/hr. Crt 1.1 this morning  - albumin 2.7 and prealbumin 14    Anorexia  -Pt's daughter reports pt barely eating for past two weeks.  -This morning pt ate most of her breakfast.   -Will continue to monitor and encourage PO intake. Strict I&Os.      Weight loss  -See above.    UTI  - UA with many bacteria and +1 leuks   - Urine culture pending  - continue ceftriaxone for now and adjust therapy as indicated by culture results    Dementia without behavioral disturbance  - daughter reports no change in mental status from baseline  - continue donepezil as prescribed  - high risk fall / injury - utilize all safety measures and fall precautions    Hypokalemia  - potassium 3.1 with initial labs, 20 mEq KCL x`1 given. Potassium 3.2 this morning.  - D5 1/2 NS + KCl 20 mEq @125 mL/hr started this morning  - potassium, sodium phosphates 280-160-250 mg 1 packet before meals  - magnesium 2.4  - maintain on telemetry    Hypernatremia  -sodium 149 this morning  -continue hydration. IVF changed to D5 1/2 NS + KCl 20 mEq @125 mL/hr  -repeat BNP at 4 hours    Type 2 diabetes mellitus without complication, without long-term use of insulin  - HbA1c 5.1  - ISS AC / HS  - 1800 ADA diet    HTN  - SBP ranging between 107-166  - continue home antihypertensive regimen  of nifedipine     Active Diagnoses:    Diagnosis Date Noted POA    PRINCIPAL PROBLEM:  Dehydration [E86.0] 08/31/2017 Yes    Anorexia [R63.0] 08/31/2017 Yes    Hypokalemia [E87.6] 08/31/2017 Unknown    Dementia without behavioral disturbance [F03.90] 02/04/2016 Yes    Type 2 diabetes mellitus without complication, without long-term current use of insulin [E11.9] 09/17/2014 Yes     Chronic    Essential hypertension [I10] 09/17/2014 Yes    Weight loss [R63.4] 02/14/2013 Yes    UTI (urinary tract infection) [N39.0] 08/02/2012 Yes      Problems Resolved During this Admission:    Diagnosis Date Noted Date Resolved POA     VTE Risk Mitigation         Ordered     heparin (porcine) injection 5,000 Units  Every 8 hours     Route:  Subcutaneous        08/31/17 1754     High Risk of VTE  Once      08/31/17 2048     Medium Risk of VTE  Once      08/31/17 2048     Place IGNACIA hose  Until discontinued      08/31/17 2048     Place sequential compression device  Until discontinued      08/31/17 2048             ARLIN Murray  Department of Hospital Medicine   [facility]

## 2017-09-01 NOTE — PT/OT/SLP EVAL
"Occupational Therapy  Evaluation    Delfina Pelletier   MRN: [id number]   Admitting Diagnosis: Dehydration    OT Date of Treatment: 09/01/17   OT Start Time: 0837  OT Stop Time: 0856  OT Total Time (min): 19 min    Billable Minutes:  Evaluation 19 minutes    Diagnosis: Dehydration   Decreased cognition; decreased participation; decreased strength, endurance, balance, ROM    Past Medical History:   Diagnosis Date    Diabetes mellitus     Diabetes mellitus type II     Diabetic neuropathy     Hyperlipidemia     Hypertension     Tuberculosis       Past Surgical History:   Procedure Laterality Date    left lung lobectomy for TB      removed at 20yrs old       Referring physician: HAYDEE Braxton  Date referred to OT: 9/1/2017    General Precautions: Standard, fall     Do you have any cultural, spiritual, Baptism conflicts, given your current situation?: none stated     Patient History:  Living Environment  Lives With: child(marianela), adult  Living Arrangements: apartment  Transportation Available: family or friend will provide  Living Environment Comment: Pt lives with daughter in a first floor apartment. She was (I) in ambulation and ADLs except requiring A with bathing and tub t/f. Pt is never alone (daughters, grandkids).  Equipment Currently Used at Home: none    Prior level of function:   Bed Mobility/Transfers: independent  Grooming: independent  Bathing: needs assist  Upper Body Dressing: independent  Lower Body Dressing: independent  Toileting: independent  Home Management Skills: unable to perform  Homemaking Responsibilities: No  Mode of Transportation: Family     Dominant hand: right    Subjective:  Communicated with RN prior to session.  "No..."  Chief Complaint: none  Patient/Family stated goals: pt did not state goals; family states goals include safe ambulation and return to PLOF/ prior level of assistance required.    Pain/Comfort  Pain Rating 1: 0/10  Pain Rating Post-Intervention 1: 0/10    Objective:  Patient " "found with: telemetry, peripheral IV, pt found supine in bed and agreeable to therapy with max encouragement    Cognitive Exam:  Oriented to: Person and Situation  Follows Commands/attention: Follows two-step commands  Communication: clear/fluent, but did not speak much except to say "no."  Memory:  Impaired STM  Safety awareness/insight to disability: impaired  Coping skills/emotional control: Appropriate to situation    Visual/perceptual:  Intact    Physical Exam:  Postural examination/scapula alignment: Rounded shoulder  Skin integrity: Visible skin intact  Edema: None noted     Sensation:   Intact    Upper Extremity Range of Motion:  Right Upper Extremity: AROM of shoulder ~85 degrees flexion/scaption/, ~75 degrees abduction; fxnl elsewhere  Left Upper Extremity: same    Upper Extremity Strength:  Right Upper Extremity: 2/5  Left Upper Extremity: 2/5   Strength: Fair    Fine motor coordination:   Intact    Gross motor coordination: WFL    Functional Mobility:  Bed Mobility:  Supine to Sit: Minimum Assistance    Transfers:  Sit <> Stand Assistance: Contact Guard Assistance  Sit <> Stand Assistive Device: No Assistive Device  Bed <> Chair Technique: Stand Pivot  Bed <> Chair Transfer Assistance: Minimum Assistance  Bed <> Chair Assistive Device: Rolling Walker    Functional Ambulation: CGA/Min A with no AD.    Activities of Daily Living:    UE Dressing Level of Assistance:  (refused)  LE Dressing Level of Assistance:  (refused; max/total A to don lace up shoes)    Balance:   Static Sit: FAIR+: Able to take MINIMAL challenges from all directions  Dynamic Sit: FAIR+: Maintains balance through MINIMAL excursions of active trunk motion  Static Stand: POOR+: Needs MINIMAL assist to maintain  Dynamic stand: POOR: N/A    Therapeutic Activities and Exercises:  Pt ed re OT role and POC. Pt performed supine to sit with Min A. Pt performed strength and ROM testing. Pt refused ADL assessment, but ultimately required " "Max/Total A to don shoes. Pt performed sit to stand t/f with CGA and pivoted with Min A to the chair. Pt was unwilling to participate further; however, PT entered the room and was able to convince the pt to walk to the door with Min A.    AM-PAC 6 CLICK ADL  How much help from another person does this patient currently need?  1 = Unable, Total/Dependent Assistance  2 = A lot, Maximum/Moderate Assistance  3 = A little, Minimum/Contact Guard/Supervision  4 = None, Modified [place]/Independent    Putting on and taking off regular lower body clothing? : 2  Bathing (including washing, rinsing, drying)?: 2  Toileting, which includes using toilet, bedpan, or urinal? : 2  Putting on and taking off regular upper body clothing?: 2  Taking care of personal grooming such as brushing teeth?: 2  Eating meals?: 2  Total Score: 12    AM-PAC Raw Score CMS "G-Code Modifier Level of Impairment Assistance   6 % Total / Unable   7 - 9 CM 80 - 100% Maximal Assist   10-14 CL 60 - 80% Moderate Assist   15 - 19 CK 40 - 60% Moderate Assist   20 - 22 CJ 20 - 40% Minimal Assist   23 CI 1-20% SBA / CGA   24 CH 0% Independent/ Mod I       Patient left up in chair with all lines intact, call button in reach and family present    Assessment:  Delfina Pelletier is a 88 y.o. female with a medical diagnosis of Dehydration and presents with the impairments listed below. Pt does not speak much and is not very participatory. She requires max encouragement and increased assistance for self care and mobility as compared to baseline. Pt would benefit from cont OT to improve mobility, and safety and motivation to participate in self care tasks.    Pt evaluation falls under low complexity for evaluation coding due to performance deficits noted in 1-3 areas as stated above and 0 co-morbities affecting current functional status. Data obtained from problem focused assessments. No modifications or assistance was required for completion of evaluation. " Only brief occupational profile and history review completed.     Rehab identified problem list/impairments: Rehab identified problem list/impairments: weakness, impaired endurance, impaired self care skills, impaired functional mobilty, gait instability, impaired balance, impaired cognition, decreased lower extremity function, decreased safety awareness    Rehab potential is good.    Activity tolerance: Fair    Discharge recommendations: Discharge Facility/Level Of Care Needs: nursing facility, skilled     Barriers to discharge:      Equipment recommendations: bath bench, walker, rolling     GOALS:    Occupational Therapy Goals        Problem: Occupational Therapy Goal    Goal Priority Disciplines Outcome Interventions   Occupational Therapy Goal     OT, PT/OT Ongoing (interventions implemented as appropriate)    Description:  Goals to be met by: 9/15/17     Patient will increase functional independence with ADLs by performing:    UE Dressing with Minimal Assistance.  LE Dressing with Minimal Assistance.  Grooming while standing at sink with Contact Guard Assistance.  Toileting from toilet with Stand-by Assistance for hygiene and clothing management.   Stand pivot transfers with Stand-by Assistance.  Toilet transfer to toilet with Stand-by Assistance.                      PLAN:  Patient to be seen 3 x/week to address the above listed problems via self-care/home management, therapeutic activities, therapeutic exercises  Plan of Care expires:    Plan of Care reviewed with: patient, daughter    ZAHRAA Ayala  9/1/2017  Pager: [phone]

## 2017-09-01 NOTE — ASSESSMENT & PLAN NOTE
- UA with many bacteria and +1 leuks   - UC pending  - continue ceftriaxone for now and adjust therapy as indicated by culture results

## 2017-09-01 NOTE — PLAN OF CARE
CM informed the patient's daughter, Ibis Vargas ([phone]), of PT/OT recommendation for SNF placement. Ibis stated that she does not want the patient to be discharge to a SNF but would be willing for the patient to receive HH care. CM informed Ibis that HH orders will be sent to [facility] & a HH company will be assigned. CM informed Ibis of bath bench recommended for home use but that [facility] would not pay for this item. Ibis verbalized understanding but again stated that she wants the patient to be discharged today. CLARISSA informed SEVERIANO Leiva of RW recommended for home use & daughters above statement. PA stated she would speak with the family. Will continue to follow.

## 2017-09-01 NOTE — PT/OT/SLP EVAL
"Physical Therapy  Evaluation    Delfina Pelletier   MRN: [id number]   Admitting Diagnosis: Dehydration    PT Received On: 09/01/17  PT Start Time: 0900     PT Stop Time: 0918    PT Total Time (min): 18 min       Billable Minutes:  Evaluation 10 Min and Gait Training8 Min    Diagnosis: Dehydration      Past Medical History:   Diagnosis Date    Diabetes mellitus     Diabetes mellitus type II     Diabetic neuropathy     Hyperlipidemia     Hypertension     Tuberculosis       Past Surgical History:   Procedure Laterality Date    left lung lobectomy for TB      removed at 20yrs old       Referring physician: Poli Sims NP  Date referred to PT: 8/31/17    General Precautions: Standard, fall  Orthopedic Precautions: N/A   Braces:         Do you have any cultural, spiritual, Christian conflicts, given your current situation?: none stated    Patient History:  Lives With: child(marianela), adult (and grandchildren)  Living Arrangements: apartment  Home Accessibility:  (no concerns)  Transportation Available: family or friend will provide  Living Environment Comment: Patient lives in an apartment with her daughter. Her grandkids are there frequently, so patient is never alone. Per daughter report, patient was Fani in mobility and ADL's.   Equipment Currently Used at Home: none  DME owned (not currently used): none    Previous Level of Function:       Subjective:  Communicated with nurse prior to session.  Patient is in room with her daughter. Patient initially not participating in therapy session, answering "no" to all questions. Daughter reports, "she can do it, shes just in a mood."  Chief Complaint: weakness and decreased functional mobility  Patient goals: none formally stated    Pain/Comfort  Pain Rating 1: 0/10      Objective:   Patient found with: telemetry, peripheral IV     Cognitive Exam:  Oriented to: Person and Situation    Follows Commands/attention: Easily distracted and Follows two-step commands  Communication: " clear/fluent  Safety awareness/insight to disability: impaired    Physical Exam:  Postural examination/scapula alignment: Rounded shoulder    Skin integrity: Visible skin intact  Edema: None noted     Sensation:   Intact    See OT Note for detailed UE assessment.    Lower Extremity Range of Motion:  Right Lower Extremity: WFL  Left Lower Extremity: WFL    Lower Extremity Strength: (Grossly 3+/5)  Right Lower Extremity: WFL  Left Lower Extremity: WFL    Gross motor coordination: WFL    Functional Mobility:  Bed Mobility:  Rolling/Turning to Left:  (Patient found sitting up in chair)    Transfers:  Sit <> Stand Assistance: Contact Guard Assistance  Sit <> Stand Assistive Device: No Assistive Device    Gait:   Gait Distance: 50'  Assistance 1: Minimum assistance  Gait Assistive Device: Hand held assist  Gait Pattern: reciprocal  Gait Deviation(s): decreased step length, decreased velocity of limb motion  -Patient requires Min A to maintain balance and to recover from ~4LOB during gait. Patient is slightly impulsive during turns and transitions. Patient not willing to walk w/ RW for extra stability    Balance:   Static Sit: GOOD-: Takes MODERATE challenges from all directions but inconsistently  Dynamic Sit: GOOD-: Maintains balance through MODERATE excursions of active trunk movement,     Static Stand: FAIR+: Takes MINIMAL challenges from all directions  Dynamic stand: POOR: N/A    Therapeutic Activities and Exercises:  PT Tieshaal completed  Patient assisted w/ hallway ambulation    AM-PAC 6 CLICK MOBILITY  How much help from another person does this patient currently need?   1 = Unable, Total/Dependent Assistance  2 = A lot, Maximum/Moderate Assistance  3 = A little, Minimum/Contact Guard/Supervision  4 = None, Modified [place]/Independent    Turning over in bed (including adjusting bedclothes, sheets and blankets)?: 4  Sitting down on and standing up from a chair with arms (e.g., wheelchair, bedside commode, etc.):  3  Moving from lying on back to sitting on the side of the bed?: 4  Moving to and from a bed to a chair (including a wheelchair)?: 3  Need to walk in hospital room?: 3  Climbing 3-5 steps with a railing?: 2  Total Score: 19     AM-PAC Raw Score CMS G-Code Modifier Level of Impairment Assistance   6 % Total / Unable   7 - 9 CM 80 - 100% Maximal Assist   10 - 14 CL 60 - 80% Moderate Assist   15 - 19 CK 40 - 60% Moderate Assist   20 - 22 CJ 20 - 40% Minimal Assist   23 CI 1-20% SBA / CGA   24 CH 0% Independent/ Mod I     Patient left up in chair with all lines intact, call button in reach, nurse notified and daughters present.    Assessment:   Delfina Pelletier is a 88 y.o. female with a medical diagnosis of Dehydration and presents with weakness and decreased functional mobility. Patient has around the clock family support. Per family report, patient was independent in ADL's and mobility. During PT evaluation, patient required max verbal cues for motivation to participate in therapy session. Patient demonstrates multiple LOB during ambulation at [facility]. Patient has an occasional forward trunk lean and varying gait speeds to maintain balance. Patient will benefit from skilled PT services to help address her weakness and improve functional mobility/ gait instability. Patient's medial status is stable and eval complexity is low.     Rehab identified problem list/impairments: Rehab identified problem list/impairments: weakness, impaired endurance, impaired self care skills, impaired functional mobilty, gait instability, impaired balance, decreased safety awareness    Rehab potential is good.    Activity tolerance: Fair    Discharge recommendations: Discharge Facility/Level Of Care Needs: nursing facility, skilled     Barriers to discharge:      Equipment recommendations: Equipment Needed After Discharge: bath bench, walker, rolling     GOALS:    Physical Therapy Goals        Problem: Physical Therapy Goal    Goal  Priority Disciplines Outcome Goal Variances Interventions   Physical Therapy Goal     PT/OT, PT Ongoing (interventions implemented as appropriate)     Description:  Goals to be met by: 17     Patient will increase functional independence with mobility by performin. Supine to sit with Set-up [place]  2. Sit to supine with Set-up [place]  3. Sit to stand transfer with Supervision  4. Bed to chair transfer with Stand-by Assistance using Rolling Walker  5. Gait  x 80 feet with Stand-by Assistance using Rolling Walker.   6. Lower extremity exercise program x15 reps per handout, with supervision                      PLAN:    Patient to be seen 4 x/week to address the above listed problems via gait training, therapeutic activities, therapeutic exercises, neuromuscular re-education  Plan of Care expires: 10/01/17  Plan of Care reviewed with: daughter, patient          Edgard Cohen, PT  2017

## 2017-09-01 NOTE — H&P
[facility] Hospital Medicine  History & Physical    Patient Name: Delfina Pelletier  MRN: [id number]  Admission Date: 8/31/2017  Attending Physician: Dilip Braxton MD   Primary Care Provider: Adi Gutiérrez MD    [facility] [place]: [facility] [place] [facility] [place] Poli Sims NP     Patient information was obtained from patient, relative(s) and ER records.     Subjective:     Principal Problem:Dehydration    Chief Complaint:   Chief Complaint   Patient presents with    Dehydration     had lab drawn today and dr called and said to come to nearest [facility] because dehydrated        HPI: 89 y/o female, who was referred to the ED by her PCP d/t abnormal lab results.  Her daughter took her to see Dr. Gutiérrez today d/t recent dietary changes and weight loss.  She reports over the past two weeks she has barely eaten anything.  She states that she will drink small quantities of water, soft drinks, popsicles, and ice cream, but for the most part has been refusing food.  Dr. Gutiérrez sent her for outpatient labs which revealed a creatinine of 1.6 which was up from her baseline of 1.0, and she was instructed to present to her nearest ED.  She has a PMH of L pneumonectomy, dementia, non-insulin-dependent type 2 diabetes mellitus, diabetic neuropathy, hypertension, hyperlipidemia and tuberculosis. She appears frail and withdrawn.  She denies discomfort, recent illness, fever, chills, CP, SOB, or N/V/D. She has a history of occasional urinary incontinence, but her daughter reports that episodes of incontinence have increased.  UA performed in the ED was suspicious for UTI and she was started on ceftriaxone.    Past Medical History:   Diagnosis Date    Diabetes mellitus     Diabetes mellitus type II     Diabetic neuropathy     Hyperlipidemia     Hypertension     Tuberculosis        Past Surgical History:   Procedure Laterality Date    left lung lobectomy for TB      removed at 20yrs old       Review of patient's allergies  indicates:  No Known Allergies    No current facility-administered medications on file prior to encounter.      Current Outpatient Prescriptions on File Prior to Encounter   Medication Sig    blood sugar diagnostic Strp 1 each by Misc.(Non-Drug; Combo Route) route once daily. True Metrix Glucometer Test Strips  Test blood sugar once daily    blood-glucose meter kit Use as instructed    docusate sodium (STOOL SOFTENER) 250 MG capsule Take 1 capsule (250 mg total) by mouth once daily.    donepezil (ARICEPT) 10 MG tablet Take 1 tablet (10 mg total) by mouth once daily.    lancets Misc 1 each by Misc.(Non-Drug; Combo Route) route once daily. TRUE METRIX METER    metformin (GLUCOPHAGE) 500 MG tablet Take 1 tablet (500 mg total) by mouth 2 (two) times daily with meals.    nifedipine (NIFEDICAL XL) 60 MG (OSM) 24 hr tablet Take 1 tablet (60 mg total) by mouth once daily.    polyethylene glycol (MIRALAX) 17 gram/dose powder Take 17 g by mouth once daily.    rosuvastatin (CRESTOR) 20 MG tablet Take 1 tablet (20 mg total) by mouth once daily.    [DISCONTINUED] risedronate (ACTONEL) 35 MG tablet Take 1 tablet (35 mg total) by mouth every 7 days.     Family History     Problem Relation (Age of Onset)    Arthritis Brother    Diabetes Mother, Sister, Daughter, Son, Daughter    Hypertension Mother, Daughter, Sister        Social History Main Topics    Smoking status: Former Smoker    Smokeless tobacco: Never Used      Comment: quit smoking over 30+ years    Alcohol use No    Drug use: No    Sexual activity: No     Review of Systems   Constitutional: Positive for appetite change and unexpected weight change. Negative for chills and fever.        Frail appearing    HENT: Negative for congestion.    Eyes: Negative for visual disturbance.   Respiratory: Negative for cough, chest tightness and shortness of breath.    Cardiovascular: Negative for chest pain, palpitations and leg swelling.   Gastrointestinal: Negative for  abdominal distention, abdominal pain, diarrhea, nausea and vomiting.   Genitourinary:        Incontinent of urine    Musculoskeletal: Negative for arthralgias and myalgias.   Skin: Negative.  Negative for rash.   Neurological: Negative for dizziness, seizures, syncope and light-headedness.     Objective:     Vital Signs (Most Recent):  Temp: 99.9 °F (37.7 °C) (08/31/17 1613)  Pulse: 80 (08/31/17 2004)  Resp: 18 (08/31/17 1613)  BP: (!) 159/71 (08/31/17 2004)  SpO2: 97 % (08/31/17 2007) Vital Signs (24h Range):  Temp:  [97.5 °F (36.4 °C)-99.9 °F (37.7 °C)] 99.9 °F (37.7 °C)  Pulse:  [] 80  Resp:  [16-18] 18  SpO2:  [97 %-98 %] 97 %  BP: (107-159)/(66-71) 159/71     Weight: 46.7 kg (103 lb)  Body mass index is 18.54 kg/m².    Physical Exam   Constitutional: She appears well-developed. No distress.   HENT:   Head: Normocephalic and atraumatic.   Eyes: EOM are normal. Pupils are equal, round, and reactive to light.   Neck: Normal range of motion. Neck supple.   Cardiovascular: Normal rate, regular rhythm, normal heart sounds and intact distal pulses.    Pulmonary/Chest: Effort normal and breath sounds normal. No respiratory distress.   Abdominal: Soft. Bowel sounds are normal. She exhibits no distension. There is no tenderness.   Musculoskeletal: Normal range of motion. She exhibits no edema.   Neurological: She is alert.   Speech is minimal but appropriate, follows simple commands   Skin: Skin is warm and dry. She is not diaphoretic.   Psychiatric: She has a normal mood and affect. She is withdrawn.   Nursing note and vitals reviewed.     Significant Labs:   CBC:   Recent Labs  Lab 08/31/17  1155 08/31/17  1703   WBC 9.21 8.13   HGB 13.6 12.3   HCT 43.9 39.0    239     CMP:   Recent Labs  Lab 08/31/17  1155 08/31/17  1703   * 147*   K 3.5 3.1*   * 113*   CO2 23 24   * 126*   BUN 28* 30*   CREATININE 1.6* 1.5*   CALCIUM 9.8 9.2   PROT 8.1 7.3   ALBUMIN 3.5 3.1*   BILITOT 0.4 0.3   ALKPHOS  85 79   AST 18 16   ALT 8* 8*   ANIONGAP 15 10   EGFRNONAA 29* 30.9*     TSH:   Recent Labs  Lab 08/31/17  1155   TSH 0.597     Urine Studies:   Recent Labs  Lab 08/31/17  1800   COLORU Marlin   APPEARANCEUA Cloudy*   PHUR 5.0   SPECGRAV 1.020   PROTEINUA 2+*   GLUCUA Negative   KETONESU Trace*   BILIRUBINUA Negative   OCCULTUA 2+*   NITRITE Negative   UROBILINOGEN 2.0   LEUKOCYTESUR 1+*   RBCUA >100*   WBCUA 50*   BACTERIA Many*   HYALINECASTS 0     All pertinent labs within the past 24 hours have been reviewed.    Significant Imaging: I have reviewed all pertinent imaging results/findings within the past 24 hours.    Assessment/Plan:     * Dehydration    Weight loss, Anorexia   - possibly r/t worsening dementia vs UTI  - prealbumin pending  - Crt 1.6 with outpatient labs - 1.5 in ED - received 500mls in the ED  - continue gentle hydration overnight and reassess labs in AM   - may want to consider dietary consultation         Dementia without behavioral disturbance    - concern for worsening dementia   - continue donepezil as prescribed  - high risk fall / injury - utilize all safety measures and fall precautions        UTI (urinary tract infection)    - UA with many bacteria and +1 leuks   - UC pending  - continue ceftriaxone for now and adjust therapy as indicated by culture results        Hypokalemia    - potassium 3.1 with initial labs  - 20 mEq KCL x`1  - magnesium 2.4  - f/u on repeat potassium level and replete as needed  - maintain on telemetry         Type 2 diabetes mellitus without complication, without long-term current use of insulin    - HgA1c pending  - glucose on arrival 126  - ISS AC / HS  - 1800 ADA diet        Essential hypertension    - SBP ranging between 107-159  - continue home antihypertensive regimen of nifedipine           VTE Risk Mitigation         Ordered     heparin (porcine) injection 5,000 Units  Every 8 hours     Route:  Subcutaneous        08/31/17 8603     High Risk of VTE  Once       08/31/17 2048     Medium Risk of VTE  Once      08/31/17 2048     Place IGNACIA hose  Until discontinued      08/31/17 2048     Place sequential compression device  Until discontinued      08/31/17 2048             Poli Sims NP  Department of Hospital Medicine   [facility]

## 2017-09-01 NOTE — HOSPITAL COURSE
Pt admitted to observation for dehydration, wt loss, anorexia.  UA with many bacteria and 1+ leukocytes.  Pt received 3 doses of ceftriaxone during hospitalization.  Na 149 on admission and improved to 144 with D5 1/2NS.  PT/OT evaluated and recommended SNF however pt's daughters prefer C.  Day 2 and 3 pt taking bites of food and appetite improved per daughter at bedside.  UC resulted VIRIDANS STREPTOCOCCUS GROUP > 100,000 cfu/ml.  Pt with no symptoms of endocarditis.  Discussed case with ID and appropriate to treat as UTI.  Pt to take augmentin 500 mg bid x 1 week.  Pt to follow up with PCP as scheduled on 9/6/17.

## 2017-09-01 NOTE — ASSESSMENT & PLAN NOTE
Weight loss, Anorexia   - possibly r/t worsening dementia vs UTI  - prealbumin 14  - Crt 1.6 with outpatient labs - 1.5 in ED - received 500mls in the ED  - continue gentle hydration overnight; changed to 1/2 NS

## 2017-09-01 NOTE — PROGRESS NOTES
[facility] Hospital Medicine  Progress Note    Patient Name: Delfina Pelletier  MRN: [id number]  Patient Class: OP- Observation   Admission Date: 8/31/2017  Length of Stay: 0 days  Attending Physician: Dilip Braxton MD  Primary Care Provider: Adi Gutiérrez MD    [facility] Medicine Team: [facility] [place] [facility] F Alee Leiva PA-C    Subjective:     Principal Problem:Dehydration    HPI:  87 y/o female, who was referred to the ED by her PCP d/t abnormal lab results.  Her daughter took her to see Dr. Gutiérrez today d/t recent dietary changes and weight loss.  She reports over the past two weeks she has barely eaten anything.  She states that she will drink small quantities of water, soft drinks, popsicles, and ice cream, but for the most part has been refusing food.  Dr. Gutiérrez sent her for outpatient labs which revealed a creatinine of 1.6 which was up from her baseline of 1.0, and she was instructed to present to her nearest ED.  She has a PMH of L pneumonectomy, dementia, non-insulin-dependent type 2 diabetes mellitus, diabetic neuropathy, hypertension, hyperlipidemia and tuberculosis. She appears frail and withdrawn.  She denies discomfort, recent illness, fever, chills, CP, SOB, or N/V/D. She has a history of occasional urinary incontinence, but her daughter reports that episodes of incontinence have increased.  UA performed in the ED was suspicious for UTI and she was started on ceftriaxone.    Hospital Course:  Pt admitted to observation for dehydration, wt loss, anorexia.  Na 149 and improved to 147 with D51/2NS.  PT/OT evaluated and recommended SNF however pt's daughters prefer HHC.  Day 2 pt taking bites of food.    Interval History: no acute events overnight, no new complaints.  Pt eating bites of breakfast and lunch.  Daughters do not want SNF but agreeable to HHC.    Review of Systems   Constitutional: Positive for appetite change and unexpected weight change. Negative for chills and fever.         Frail appearing    HENT: Negative for congestion.    Eyes: Negative for visual disturbance.   Respiratory: Negative for cough, chest tightness and shortness of breath.    Cardiovascular: Negative for chest pain, palpitations and leg swelling.   Gastrointestinal: Negative for abdominal distention, abdominal pain, diarrhea, nausea and vomiting.   Genitourinary:        Incontinent of urine    Musculoskeletal: Negative for arthralgias and myalgias.   Skin: Negative.  Negative for rash.   Neurological: Negative for dizziness, seizures, syncope and light-headedness.   Psychiatric/Behavioral: Positive for confusion. Negative for agitation, hallucinations and self-injury.     Objective:     Vital Signs (Most Recent):  Temp: 97.5 °F (36.4 °C) (09/01/17 0818)  Pulse: 74 (09/01/17 1500)  Resp: 16 (09/01/17 0818)  BP: (!) 158/69 (09/01/17 0818)  SpO2: 97 % (09/01/17 0818) Vital Signs (24h Range):  Temp:  [97.5 °F (36.4 °C)-99.9 °F (37.7 °C)] 97.5 °F (36.4 °C)  Pulse:  [] 74  Resp:  [16-20] 16  SpO2:  [97 %-99 %] 97 %  BP: (107-166)/(63-72) 158/69     Weight: 47.9 kg (105 lb 9.6 oz)  Body mass index is 19.01 kg/m².    Intake/Output Summary (Last 24 hours) at 09/01/17 1557  Last data filed at 09/01/17 1228   Gross per 24 hour   Intake              460 ml   Output              500 ml   Net              -40 ml      Physical Exam   Constitutional: She appears well-developed. No distress.   HENT:   Head: Normocephalic and atraumatic.   Eyes: EOM are normal. Pupils are equal, round, and reactive to light.   Neck: Normal range of motion. Neck supple.   Cardiovascular: Normal rate, regular rhythm, normal heart sounds and intact distal pulses.    Pulmonary/Chest: Effort normal and breath sounds normal. No respiratory distress.   Abdominal: Soft. Bowel sounds are normal. She exhibits no distension. There is no tenderness.   Musculoskeletal: Normal range of motion. She exhibits no edema.   Neurological: She is alert.   Speech is  minimal but appropriate, follows simple commands   Skin: Skin is warm and dry. She is not diaphoretic.   Psychiatric: She has a normal mood and affect. She is withdrawn.   Nursing note and vitals reviewed.      Significant Labs: All pertinent labs within the past 24 hours have been reviewed.    Significant Imaging: I have reviewed all pertinent imaging results/findings within the past 24 hours.    Assessment/Plan:      * Dehydration    Weight loss, Anorexia   - possibly r/t worsening dementia vs UTI  - prealbumin 14  - Crt 1.6 with outpatient labs - 1.5 in ED - received 500mls in the ED  - continue gentle hydration overnight; changed to 1/2 NS        Dementia without behavioral disturbance    - concern for worsening dementia   - continue donepezil as prescribed  - high risk fall / injury - utilize all safety measures and fall precautions        UTI (urinary tract infection)    - UA with many bacteria and +1 leuks   - UC pending  - continue ceftriaxone for now and adjust therapy as indicated by culture results        Type 2 diabetes mellitus without complication, without long-term current use of insulin    - HgA1c pending  - glucose on arrival 126  - ISS AC / HS  - 1800 ADA diet        Essential hypertension    - SBP ranging between 107-159  - continue home antihypertensive regimen of nifedipine           VTE Risk Mitigation         Ordered     heparin (porcine) injection 5,000 Units  Every 8 hours     Route:  Subcutaneous        08/31/17 1754     High Risk of VTE  Once      08/31/17 2048     Medium Risk of VTE  Once      08/31/17 2048     Place IGNACIA hose  Until discontinued      08/31/17 2048     Place sequential compression device  Until discontinued      08/31/17 2048              Alee Leiva PA-C  Department of Hospital Medicine   [facility]

## 2017-09-01 NOTE — PLAN OF CARE
09/01/17 1040   Discharge Assessment   Assessment Type Discharge Planning Assessment   Confirmed/corrected address and phone number on facesheet? Yes   Assessment information obtained from? Patient   Expected Length of Stay (days) 1   Communicated expected length of stay with patient/caregiver yes   Prior to hospitilization cognitive status: Alert/Oriented   Prior to hospitalization functional status: Independent   Current cognitive status: Alert/Oriented   Current Functional Status: Independent   Lives With child(marianela), adult  (Naomi carrillo ([phone]))   Able to Return to Prior Arrangements yes   Is patient able to care for self after discharge? Yes   Patient's perception of discharge disposition home or selfcare   Readmission Within The Last 30 Days no previous admission in last 30 days   Patient currently being followed by outpatient case management? No   Patient currently receives any other outside agency services? No   Equipment Currently Used at Home none  (pt has BSC but doesn't use it)   Do you have any problems affording any of your prescribed medications? No   Is the patient taking medications as prescribed? yes   Does the patient have transportation home? Yes   Transportation Available family or friend will provide   Does the patient receive services at the [facility]? No   Discharge Plan A Home with family   Discharge Plan B Home Health   Patient/Family In Agreement With Plan yes     Patient resting quietly in bed with Naomi shen ([phone]), & Ibis Vargas ([phone]), at the bedside when CM rounded. Patient was admitted with dehydration. Orders noted for IVF. Patient lives with Naomi & is independent of all ADLs. PT/OT recommending SNF placement when medically stable. Daughters denied the need for assistance with transportation at time of discharge & stated that they want the patient to be discharged today.  Hospital follow up appointment scheduled for  the patient with Dr. Adi Gutiérrez (PCP) on 9/6/17at 1040. Will continue to follow.

## 2017-09-01 NOTE — ED NOTES
Patient on stretcher, Bed placed in low/locked position, side rails up x 2, call light is within reach of patient and family, Patient placed into position of comfort. Patient in NAD and offers no complaints at this time. Will continue to monitor.

## 2017-09-01 NOTE — SUBJECTIVE & OBJECTIVE
Past Medical History:   Diagnosis Date    Diabetes mellitus     Diabetes mellitus type II     Diabetic neuropathy     Hyperlipidemia     Hypertension     Tuberculosis        Past Surgical History:   Procedure Laterality Date    left lung lobectomy for TB      removed at 20yrs old       Review of patient's allergies indicates:  No Known Allergies    No current facility-administered medications on file prior to encounter.      Current Outpatient Prescriptions on File Prior to Encounter   Medication Sig    blood sugar diagnostic Strp 1 each by Misc.(Non-Drug; Combo Route) route once daily. True Metrix Glucometer Test Strips  Test blood sugar once daily    blood-glucose meter kit Use as instructed    docusate sodium (STOOL SOFTENER) 250 MG capsule Take 1 capsule (250 mg total) by mouth once daily.    donepezil (ARICEPT) 10 MG tablet Take 1 tablet (10 mg total) by mouth once daily.    lancets Misc 1 each by Misc.(Non-Drug; Combo Route) route once daily. TRUE METRIX METER    metformin (GLUCOPHAGE) 500 MG tablet Take 1 tablet (500 mg total) by mouth 2 (two) times daily with meals.    nifedipine (NIFEDICAL XL) 60 MG (OSM) 24 hr tablet Take 1 tablet (60 mg total) by mouth once daily.    polyethylene glycol (MIRALAX) 17 gram/dose powder Take 17 g by mouth once daily.    rosuvastatin (CRESTOR) 20 MG tablet Take 1 tablet (20 mg total) by mouth once daily.    [DISCONTINUED] risedronate (ACTONEL) 35 MG tablet Take 1 tablet (35 mg total) by mouth every 7 days.     Family History     Problem Relation (Age of Onset)    Arthritis Brother    Diabetes Mother, Sister, Daughter, Son, Daughter    Hypertension Mother, Daughter, Sister        Social History Main Topics    Smoking status: Former Smoker    Smokeless tobacco: Never Used      Comment: quit smoking over 30+ years    Alcohol use No    Drug use: No    Sexual activity: No     Review of Systems   Constitutional: Positive for appetite change and unexpected  weight change. Negative for chills and fever.        Frail appearing    HENT: Negative for congestion.    Eyes: Negative for visual disturbance.   Respiratory: Negative for cough, chest tightness and shortness of breath.    Cardiovascular: Negative for chest pain, palpitations and leg swelling.   Gastrointestinal: Negative for abdominal distention, abdominal pain, diarrhea, nausea and vomiting.   Genitourinary:        Incontinent of urine    Musculoskeletal: Negative for arthralgias and myalgias.   Skin: Negative.  Negative for rash.   Neurological: Negative for dizziness, seizures, syncope and light-headedness.     Objective:     Vital Signs (Most Recent):  Temp: 99.9 °F (37.7 °C) (08/31/17 1613)  Pulse: 80 (08/31/17 2004)  Resp: 18 (08/31/17 1613)  BP: (!) 159/71 (08/31/17 2004)  SpO2: 97 % (08/31/17 2007) Vital Signs (24h Range):  Temp:  [97.5 °F (36.4 °C)-99.9 °F (37.7 °C)] 99.9 °F (37.7 °C)  Pulse:  [] 80  Resp:  [16-18] 18  SpO2:  [97 %-98 %] 97 %  BP: (107-159)/(66-71) 159/71     Weight: 46.7 kg (103 lb)  Body mass index is 18.54 kg/m².    Physical Exam   Constitutional: She appears well-developed. No distress.   HENT:   Head: Normocephalic and atraumatic.   Eyes: EOM are normal. Pupils are equal, round, and reactive to light.   Neck: Normal range of motion. Neck supple.   Cardiovascular: Normal rate, regular rhythm, normal heart sounds and intact distal pulses.    Pulmonary/Chest: Effort normal and breath sounds normal. No respiratory distress.   Abdominal: Soft. Bowel sounds are normal. She exhibits no distension. There is no tenderness.   Musculoskeletal: Normal range of motion. She exhibits no edema.   Neurological: She is alert.   Speech is minimal but appropriate, follows simple commands   Skin: Skin is warm and dry. She is not diaphoretic.   Psychiatric: She has a normal mood and affect. She is withdrawn.   Nursing note and vitals reviewed.     Significant Labs:   CBC:   Recent Labs  Lab  08/31/17  1155 08/31/17  1703   WBC 9.21 8.13   HGB 13.6 12.3   HCT 43.9 39.0    239     CMP:   Recent Labs  Lab 08/31/17  1155 08/31/17  1703   * 147*   K 3.5 3.1*   * 113*   CO2 23 24   * 126*   BUN 28* 30*   CREATININE 1.6* 1.5*   CALCIUM 9.8 9.2   PROT 8.1 7.3   ALBUMIN 3.5 3.1*   BILITOT 0.4 0.3   ALKPHOS 85 79   AST 18 16   ALT 8* 8*   ANIONGAP 15 10   EGFRNONAA 29* 30.9*     TSH:   Recent Labs  Lab 08/31/17  1155   TSH 0.597     Urine Studies:   Recent Labs  Lab 08/31/17  1800   COLORU Marlin   APPEARANCEUA Cloudy*   PHUR 5.0   SPECGRAV 1.020   PROTEINUA 2+*   GLUCUA Negative   KETONESU Trace*   BILIRUBINUA Negative   OCCULTUA 2+*   NITRITE Negative   UROBILINOGEN 2.0   LEUKOCYTESUR 1+*   RBCUA >100*   WBCUA 50*   BACTERIA Many*   HYALINECASTS 0     All pertinent labs within the past 24 hours have been reviewed.    Significant Imaging: I have reviewed all pertinent imaging results/findings within the past 24 hours.

## 2017-09-01 NOTE — ED NOTES
Patient on stretcher, Bed placed in low/locked position, side rails up x 2, call light is within reach of patient and family, Patient placed into position of comfort. Hygiene performed. In/out cath 500cc urine. Patient in NAD and offers no complaints at this time. Will continue to monitor.

## 2017-09-02 VITALS
OXYGEN SATURATION: 92 % | WEIGHT: 111.31 LBS | DIASTOLIC BLOOD PRESSURE: 67 MMHG | BODY MASS INDEX: 19.72 KG/M2 | HEIGHT: 63 IN | TEMPERATURE: 99 F | HEART RATE: 90 BPM | RESPIRATION RATE: 18 BRPM | SYSTOLIC BLOOD PRESSURE: 154 MMHG

## 2017-09-02 LAB
ALBUMIN SERPL BCP-MCNC: 2.6 G/DL
ALP SERPL-CCNC: 85 U/L
ALT SERPL W/O P-5'-P-CCNC: 7 U/L
ANION GAP SERPL CALC-SCNC: 8 MMOL/L
AST SERPL-CCNC: 19 U/L
BACTERIA UR CULT: NORMAL
BASOPHILS # BLD AUTO: 0.02 K/UL
BASOPHILS NFR BLD: 0.3 %
BILIRUB SERPL-MCNC: 0.2 MG/DL
BUN SERPL-MCNC: 9 MG/DL
CALCIUM SERPL-MCNC: 8.5 MG/DL
CHLORIDE SERPL-SCNC: 114 MMOL/L
CO2 SERPL-SCNC: 22 MMOL/L
CREAT SERPL-MCNC: 0.9 MG/DL
DIFFERENTIAL METHOD: ABNORMAL
EOSINOPHIL # BLD AUTO: 0.1 K/UL
EOSINOPHIL NFR BLD: 1.4 %
ERYTHROCYTE [DISTWIDTH] IN BLOOD BY AUTOMATED COUNT: 13.6 %
EST. GFR  (AFRICAN AMERICAN): >60 ML/MIN/1.73 M^2
EST. GFR  (NON AFRICAN AMERICAN): 57.3 ML/MIN/1.73 M^2
GLUCOSE SERPL-MCNC: 176 MG/DL
HCT VFR BLD AUTO: 39.6 %
HGB BLD-MCNC: 12.1 G/DL
INR PPP: 0.9
LYMPHOCYTES # BLD AUTO: 1.8 K/UL
LYMPHOCYTES NFR BLD: 25.5 %
MAGNESIUM SERPL-MCNC: 1.9 MG/DL
MCH RBC QN AUTO: 27.3 PG
MCHC RBC AUTO-ENTMCNC: 30.6 G/DL
MCV RBC AUTO: 89 FL
MONOCYTES # BLD AUTO: 0.6 K/UL
MONOCYTES NFR BLD: 7.7 %
NEUTROPHILS # BLD AUTO: 4.7 K/UL
NEUTROPHILS NFR BLD: 64.8 %
PHOSPHATE SERPL-MCNC: 2.1 MG/DL
PLATELET # BLD AUTO: 196 K/UL
PMV BLD AUTO: 12.6 FL
POCT GLUCOSE: 109 MG/DL (ref 70–110)
POCT GLUCOSE: 209 MG/DL (ref 70–110)
POTASSIUM SERPL-SCNC: 3.7 MMOL/L
PROT SERPL-MCNC: 6.4 G/DL
PROTHROMBIN TIME: 10.2 SEC
RBC # BLD AUTO: 4.43 M/UL
SODIUM SERPL-SCNC: 144 MMOL/L
WBC # BLD AUTO: 7.17 K/UL

## 2017-09-02 PROCEDURE — 63600175 PHARM REV CODE 636 W HCPCS: Performed by: PHYSICIAN ASSISTANT

## 2017-09-02 PROCEDURE — A4216 STERILE WATER/SALINE, 10 ML: HCPCS | Performed by: HOSPITALIST

## 2017-09-02 PROCEDURE — G0378 HOSPITAL OBSERVATION PER HR: HCPCS

## 2017-09-02 PROCEDURE — 85025 COMPLETE CBC W/AUTO DIFF WBC: CPT

## 2017-09-02 PROCEDURE — 25000003 PHARM REV CODE 250: Performed by: NURSE PRACTITIONER

## 2017-09-02 PROCEDURE — 85610 PROTHROMBIN TIME: CPT

## 2017-09-02 PROCEDURE — 36415 COLL VENOUS BLD VENIPUNCTURE: CPT

## 2017-09-02 PROCEDURE — 80053 COMPREHEN METABOLIC PANEL: CPT

## 2017-09-02 PROCEDURE — 99217 PR OBSERVATION CARE DISCHARGE: CPT | Mod: ,,, | Performed by: PHYSICIAN ASSISTANT

## 2017-09-02 PROCEDURE — 63600175 PHARM REV CODE 636 W HCPCS: Performed by: HOSPITALIST

## 2017-09-02 PROCEDURE — 84100 ASSAY OF PHOSPHORUS: CPT

## 2017-09-02 PROCEDURE — 25000003 PHARM REV CODE 250: Performed by: HOSPITALIST

## 2017-09-02 PROCEDURE — 25000003 PHARM REV CODE 250: Performed by: PHYSICIAN ASSISTANT

## 2017-09-02 PROCEDURE — 83735 ASSAY OF MAGNESIUM: CPT

## 2017-09-02 RX ADMIN — POTASSIUM & SODIUM PHOSPHATES POWDER PACK 280-160-250 MG 1 PACKET: 280-160-250 PACK at 12:09

## 2017-09-02 RX ADMIN — HEPARIN SODIUM 5000 UNITS: 5000 INJECTION, SOLUTION INTRAVENOUS; SUBCUTANEOUS at 02:09

## 2017-09-02 RX ADMIN — HEPARIN SODIUM 5000 UNITS: 5000 INJECTION, SOLUTION INTRAVENOUS; SUBCUTANEOUS at 05:09

## 2017-09-02 RX ADMIN — POTASSIUM & SODIUM PHOSPHATES POWDER PACK 280-160-250 MG 1 PACKET: 280-160-250 PACK at 06:09

## 2017-09-02 RX ADMIN — DEXTROSE MONOHYDRATE, SODIUM CHLORIDE, AND POTASSIUM CHLORIDE 1000 ML: 50; 4.5; 1.49 INJECTION, SOLUTION INTRAVENOUS at 03:09

## 2017-09-02 RX ADMIN — INSULIN ASPART 2 UNITS: 100 INJECTION, SOLUTION INTRAVENOUS; SUBCUTANEOUS at 08:09

## 2017-09-02 RX ADMIN — ROSUVASTATIN CALCIUM 20 MG: 20 TABLET, FILM COATED ORAL at 08:09

## 2017-09-02 RX ADMIN — CEFTRIAXONE 1 G: 1 INJECTION, SOLUTION INTRAVENOUS at 09:09

## 2017-09-02 RX ADMIN — DONEPEZIL HYDROCHLORIDE 10 MG: 10 TABLET, FILM COATED ORAL at 08:09

## 2017-09-02 RX ADMIN — Medication 3 ML: at 06:09

## 2017-09-02 RX ADMIN — NIFEDIPINE 60 MG: 60 TABLET, FILM COATED, EXTENDED RELEASE ORAL at 08:09

## 2017-09-02 NOTE — PLAN OF CARE
CM called and spoke with Sigrid with [facility]. Sigrid states there is no record of anyone rec'ing home health orders/info on patient and requested CM fax to [phone]. Face sheet, H&P, PT/OT evals, and orders faxed to Sigrid.  Per Sigrid's request, CM called and spoke with Marisa at [facility] and they can accept the referral. All paper work also faxed to [facility] at [phone]. Will follow.    Unable to send via  as  virtual printer not working.

## 2017-09-02 NOTE — NURSING
Discharged instructions given to the pts daughter , informed that MD will call them  and the pharmacy as soon as they got the result of the urine culture. Daughter verbalized understanding. PIV discontinued with tip intact. Pt is waiting for hospital wheelchair to take her down to the parking area. Daughter at bedside.

## 2017-09-02 NOTE — PLAN OF CARE
Problem: Diabetes, Type 2 (Adult)  Goal: Signs and Symptoms of Listed Potential Problems Will be Absent, Minimized or Managed (Diabetes, Type 2)  Signs and symptoms of listed potential problems will be absent, minimized or managed by discharge/transition of care (reference Diabetes, Type 2 (Adult) CPG).   Outcome: Ongoing (interventions implemented as appropriate)  Blood glucose

## 2017-09-03 NOTE — DISCHARGE SUMMARY
[facility] Hospital Medicine  Discharge Summary      Patient Name: Delfina Pelletier  MRN: [id number]  Admission Date: 8/31/2017  Hospital Length of Stay: 0 days  Discharge Date and Time:  9/2/2017  Attending Physician: No att. providers found   Discharging Provider: Alee Leiva PA-C  Primary Care Provider: Adi Gutiérrez MD  [facility] Medicine Team: [facility] Alee Leiva PA-C    HPI:   89 y/o female, who was referred to the ED by her PCP d/t abnormal lab results.  Her daughter took her to see Dr. Gutiérrez today d/t recent dietary changes and weight loss.  She reports over the past two weeks she has barely eaten anything.  She states that she will drink small quantities of water, soft drinks, popsicles, and ice cream, but for the most part has been refusing food.  Dr. Gutiérrez sent her for outpatient labs which revealed a creatinine of 1.6 which was up from her baseline of 1.0, and she was instructed to present to her nearest ED.  She has a PMH of L pneumonectomy, dementia, non-insulin-dependent type 2 diabetes mellitus, diabetic neuropathy, hypertension, hyperlipidemia and tuberculosis. She appears frail and withdrawn.  She denies discomfort, recent illness, fever, chills, CP, SOB, or N/V/D. She has a history of occasional urinary incontinence, but her daughter reports that episodes of incontinence have increased.  UA performed in the ED was suspicious for UTI and she was started on ceftriaxone.    * No surgery found *      Indwelling Lines/Drains at time of discharge:   Lines/Drains/Airways          No matching active lines, drains, or airways        Hospital Course:   Pt admitted to observation for dehydration, wt loss, anorexia.  UA with many bacteria and 1+ leukocytes.  Pt received 3 doses of ceftriaxone during hospitalization.  Na 149 on admission and improved to 144 with D5 1/2NS.  PT/OT evaluated and recommended SNF however pt's daughters prefer HHC.  Day 2 and 3 pt taking bites of food  and appetite improved per daughter at bedside.  UC resulted VIRIDANS STREPTOCOCCUS GROUP > 100,000 cfu/ml.  Pt with no symptoms of endocarditis.  Discussed case with ID and appropriate to treat as UTI.  Pt to take augmentin 500 mg bid x 1 week.  Pt to follow up with PCP as scheduled on 9/6/17.     Consults:   Consults         Status Ordering Provider     Case Management/  Once     Provider:  (Not yet assigned)    Completed PATRICK WAN Diagnostic Studies: Labs: All labs within the past 24 hours have been reviewed    Pending Diagnostic Studies:     None        Final Active Diagnoses:    Diagnosis Date Noted POA    PRINCIPAL PROBLEM:  Dehydration [E86.0] 08/31/2017 Yes    Dementia without behavioral disturbance [F03.90] 02/04/2016 Yes    UTI (urinary tract infection) [N39.0] 08/02/2012 Yes    Type 2 diabetes mellitus without complication, without long-term current use of insulin [E11.9] 09/17/2014 Yes     Chronic    Essential hypertension [I10] 09/17/2014 Yes    Anorexia [R63.0] 08/31/2017 Yes    Weight loss [R63.4] 02/14/2013 Yes      Problems Resolved During this Admission:    Diagnosis Date Noted Date Resolved POA    Hypokalemia [E87.6] 08/31/2017 09/01/2017 Unknown      UTI (urinary tract infection)    - UA with many bacteria and +1 leuks   - UC pending  - continue ceftriaxone for now and adjust therapy as indicated by culture results            Discharged Condition: good    Disposition: [place]-Health Care [facility] [place]    Follow Up:  Follow-up Information     Adi Gutiérrez MD On 9/6/2017.    Specialty:  Internal Medicine  Why:  at 10:40 AM  Contact information:  [address] [id number] [phone]                 Patient Instructions:     Diet general     Activity as tolerated       Medications:  Reconciled Home Medications:   Discharge Medication List as of 9/2/2017  3:35 PM      CONTINUE these medications which have NOT CHANGED    Details   blood sugar diagnostic  Strp 1 each by Misc.(Non-Drug; Combo Route) route once daily. True Metrix Glucometer Test Strips  Test blood sugar once daily, Starting Wed 5/3/2017, Print      blood-glucose meter kit Use as instructed, Print      docusate sodium (STOOL SOFTENER) 250 MG capsule Take 1 capsule (250 mg total) by mouth once daily., Starting 2/13/2017, Until Discontinued, OTC      donepezil (ARICEPT) 10 MG tablet Take 1 tablet (10 mg total) by mouth once daily., Starting Wed 5/31/2017, Normal      lancets Misc 1 each by Misc.(Non-Drug; Combo Route) route once daily. TRUE METRIX METER, Starting Wed 5/3/2017, Print      metformin (GLUCOPHAGE) 500 MG tablet Take 1 tablet (500 mg total) by mouth 2 (two) times daily with meals., Starting Wed 5/31/2017, Normal      nifedipine (NIFEDICAL XL) 60 MG (OSM) 24 hr tablet Take 1 tablet (60 mg total) by mouth once daily., Starting Wed 5/31/2017, Normal      polyethylene glycol (MIRALAX) 17 gram/dose powder Take 17 g by mouth once daily., Starting 2/13/2017, Until Discontinued, Normal      rosuvastatin (CRESTOR) 20 MG tablet Take 1 tablet (20 mg total) by mouth once daily., Starting Wed 5/31/2017, Normal           Time spent on the discharge of patient: >30 minutes    HOS POC IP DISCHARGE SUMMARY    Alee Leiva PA-C  Department of Hospital Medicine  [facility]

## 2017-09-05 NOTE — PLAN OF CARE
09/05/17 1713   Final Note   Assessment Type Final Discharge Note     Patient discharged home with Family  9/2/17.

## 2017-09-06 ENCOUNTER — OFFICE VISIT (OUTPATIENT)
Dept: FAMILY MEDICINE | Facility: CLINIC | Age: 82
End: 2017-09-06
Payer: MEDICARE

## 2017-09-06 VITALS
DIASTOLIC BLOOD PRESSURE: 82 MMHG | OXYGEN SATURATION: 97 % | HEART RATE: 104 BPM | BODY MASS INDEX: 19.14 KG/M2 | WEIGHT: 108 LBS | RESPIRATION RATE: 17 BRPM | TEMPERATURE: 99 F | SYSTOLIC BLOOD PRESSURE: 140 MMHG | HEIGHT: 63 IN

## 2017-09-06 DIAGNOSIS — R31.9 URINARY TRACT INFECTION WITH HEMATURIA, SITE UNSPECIFIED: ICD-10-CM

## 2017-09-06 DIAGNOSIS — E86.0 DEHYDRATION: ICD-10-CM

## 2017-09-06 DIAGNOSIS — N17.9 AKI (ACUTE KIDNEY INJURY): Primary | ICD-10-CM

## 2017-09-06 DIAGNOSIS — N39.0 URINARY TRACT INFECTION WITH HEMATURIA, SITE UNSPECIFIED: ICD-10-CM

## 2017-09-06 PROCEDURE — 1157F ADVNC CARE PLAN IN RCRD: CPT | Mod: S$GLB,,, | Performed by: INTERNAL MEDICINE

## 2017-09-06 PROCEDURE — 3008F BODY MASS INDEX DOCD: CPT | Mod: S$GLB,,, | Performed by: INTERNAL MEDICINE

## 2017-09-06 PROCEDURE — 99214 OFFICE O/P EST MOD 30 MIN: CPT | Mod: S$GLB,,, | Performed by: INTERNAL MEDICINE

## 2017-09-06 PROCEDURE — 1126F AMNT PAIN NOTED NONE PRSNT: CPT | Mod: S$GLB,,, | Performed by: INTERNAL MEDICINE

## 2017-09-06 PROCEDURE — 99999 PR PBB SHADOW E&M-EST. PATIENT-LVL III: CPT | Mod: PBBFAC,,, | Performed by: INTERNAL MEDICINE

## 2017-09-06 PROCEDURE — 1159F MED LIST DOCD IN RCRD: CPT | Mod: S$GLB,,, | Performed by: INTERNAL MEDICINE

## 2017-09-06 RX ORDER — AMOXICILLIN AND CLAVULANATE POTASSIUM 500; 125 MG/1; MG/1
1 TABLET, FILM COATED ORAL
COMMUNITY
End: 2018-01-19 | Stop reason: ALTCHOICE

## 2017-09-06 NOTE — PROGRESS NOTES
Subjective:       Patient ID: Delfina Pelletier is a 88 y.o. female.    Chief Complaint: Hospital Follow Up    She presents today with her daughter for follow-up after recent admission with dehydration and urinary tract infection.  She has been doing well since discharge.  Her appetite is back to normal she is eating and drinking well.  There is no increased frequency.  She is taking her antibiotic.  She has no new complaints today.      Review of Systems   Constitutional: Negative for appetite change and fever.   Gastrointestinal: Negative for abdominal pain.   Genitourinary: Negative for frequency.       Objective:      Physical Exam   Constitutional: She is oriented to person, place, and time. She appears well-developed and well-nourished. No distress.   HENT:   Head: Normocephalic and atraumatic.   Right Ear: External ear normal.   Left Ear: External ear normal.   Mouth/Throat: Oropharynx is clear and moist. No oropharyngeal exudate.   Eyes: Conjunctivae are normal. No scleral icterus.   Cardiovascular: Normal rate, regular rhythm and normal heart sounds.  Exam reveals no gallop and no friction rub.    No murmur heard.  Pulmonary/Chest: Effort normal and breath sounds normal. No respiratory distress. She has no wheezes. She has no rales.   Musculoskeletal: She exhibits no edema or tenderness.   Neurological: She is alert and oriented to person, place, and time. No cranial nerve deficit.   Skin: Skin is warm and dry.   Psychiatric: She has a normal mood and affect.   Vitals reviewed.      Assessment:    Dehydration     Plan:       Delfina was seen today for hospital follow up.    Diagnoses and all orders for this visit:    Dehydration - he presents for follow-up after recent admission with dehydration, acute kidney injury with elevated creatinine and urinary tract infection.  Her creatinine was back to normal prior to discharge.  She is now eating and drinking.    Urinary tract infection with hematuria, site  unspecified - complete course of Augmentin    CHERI - creatinine at baseline.  Will repeat in one month.  I have instructed the patient's daughter to call with any concerns with her appetite       follow-up in one month

## 2017-09-17 ENCOUNTER — HOSPITAL ENCOUNTER (EMERGENCY)
Facility: HOSPITAL | Age: 82
Discharge: HOME | End: 2017-09-18
Attending: EMERGENCY MEDICINE
Payer: MEDICARE

## 2017-09-17 DIAGNOSIS — R40.4 TRANSIENT ALTERATION OF AWARENESS: Primary | ICD-10-CM

## 2017-09-17 DIAGNOSIS — E11.9 TYPE 2 DIABETES MELLITUS WITHOUT COMPLICATION, WITHOUT LONG-TERM CURRENT USE OF INSULIN: Chronic | ICD-10-CM

## 2017-09-17 DIAGNOSIS — R53.83 FATIGUE: ICD-10-CM

## 2017-09-17 LAB
ABO + RH BLD: NORMAL
ALBUMIN SERPL BCP-MCNC: 2.9 G/DL
ALP SERPL-CCNC: 76 U/L
ALT SERPL W/O P-5'-P-CCNC: 7 U/L
ANION GAP SERPL CALC-SCNC: 8 MMOL/L
APTT BLDCRRT: <21 SEC
AST SERPL-CCNC: 16 U/L
BASOPHILS # BLD AUTO: 0.02 K/UL
BASOPHILS NFR BLD: 0.4 %
BILIRUB SERPL-MCNC: 0.3 MG/DL
BLD GP AB SCN CELLS X3 SERPL QL: NORMAL
BNP SERPL-MCNC: 111 PG/ML
BUN SERPL-MCNC: 10 MG/DL
CALCIUM SERPL-MCNC: 9.1 MG/DL
CHLORIDE SERPL-SCNC: 105 MMOL/L
CO2 SERPL-SCNC: 24 MMOL/L
CREAT SERPL-MCNC: 0.8 MG/DL
DIFFERENTIAL METHOD: ABNORMAL
EOSINOPHIL # BLD AUTO: 0.1 K/UL
EOSINOPHIL NFR BLD: 1.3 %
ERYTHROCYTE [DISTWIDTH] IN BLOOD BY AUTOMATED COUNT: 14.2 %
EST. GFR  (AFRICAN AMERICAN): >60 ML/MIN/1.73 M^2
EST. GFR  (NON AFRICAN AMERICAN): >60 ML/MIN/1.73 M^2
GLUCOSE SERPL-MCNC: 116 MG/DL
HCT VFR BLD AUTO: 35.9 %
HGB BLD-MCNC: 11.4 G/DL
INR PPP: 0.9
LACTATE SERPL-SCNC: 0.9 MMOL/L
LIPASE SERPL-CCNC: 22 U/L
LYMPHOCYTES # BLD AUTO: 1.5 K/UL
LYMPHOCYTES NFR BLD: 31.4 %
MAGNESIUM SERPL-MCNC: 1.9 MG/DL
MCH RBC QN AUTO: 27.9 PG
MCHC RBC AUTO-ENTMCNC: 31.8 G/DL
MCV RBC AUTO: 88 FL
MONOCYTES # BLD AUTO: 0.4 K/UL
MONOCYTES NFR BLD: 8.2 %
NEUTROPHILS # BLD AUTO: 2.8 K/UL
NEUTROPHILS NFR BLD: 58.5 %
PLATELET # BLD AUTO: 233 K/UL
PMV BLD AUTO: 11.1 FL
POTASSIUM SERPL-SCNC: 3.9 MMOL/L
PROT SERPL-MCNC: 7 G/DL
PROTHROMBIN TIME: 9.7 SEC
RBC # BLD AUTO: 4.09 M/UL
SODIUM SERPL-SCNC: 137 MMOL/L
WBC # BLD AUTO: 4.77 K/UL

## 2017-09-17 PROCEDURE — 83690 ASSAY OF LIPASE: CPT

## 2017-09-17 PROCEDURE — 87040 BLOOD CULTURE FOR BACTERIA: CPT

## 2017-09-17 PROCEDURE — 80053 COMPREHEN METABOLIC PANEL: CPT

## 2017-09-17 PROCEDURE — 94761 N-INVAS EAR/PLS OXIMETRY MLT: CPT

## 2017-09-17 PROCEDURE — 83880 ASSAY OF NATRIURETIC PEPTIDE: CPT

## 2017-09-17 PROCEDURE — 99285 EMERGENCY DEPT VISIT HI MDM: CPT | Mod: ,,, | Performed by: EMERGENCY MEDICINE

## 2017-09-17 PROCEDURE — 83605 ASSAY OF LACTIC ACID: CPT

## 2017-09-17 PROCEDURE — 85610 PROTHROMBIN TIME: CPT

## 2017-09-17 PROCEDURE — 83735 ASSAY OF MAGNESIUM: CPT

## 2017-09-17 PROCEDURE — 85025 COMPLETE CBC W/AUTO DIFF WBC: CPT

## 2017-09-17 PROCEDURE — 86900 BLOOD TYPING SEROLOGIC ABO: CPT

## 2017-09-17 PROCEDURE — 84484 ASSAY OF TROPONIN QUANT: CPT

## 2017-09-17 PROCEDURE — 93005 ELECTROCARDIOGRAM TRACING: CPT

## 2017-09-17 PROCEDURE — 25000003 PHARM REV CODE 250: Performed by: EMERGENCY MEDICINE

## 2017-09-17 PROCEDURE — 99284 EMERGENCY DEPT VISIT MOD MDM: CPT | Mod: 25

## 2017-09-17 PROCEDURE — 85730 THROMBOPLASTIN TIME PARTIAL: CPT

## 2017-09-17 PROCEDURE — 86901 BLOOD TYPING SEROLOGIC RH(D): CPT

## 2017-09-17 PROCEDURE — 96360 HYDRATION IV INFUSION INIT: CPT

## 2017-09-17 PROCEDURE — 93010 ELECTROCARDIOGRAM REPORT: CPT | Mod: ,,, | Performed by: INTERNAL MEDICINE

## 2017-09-17 PROCEDURE — 84443 ASSAY THYROID STIM HORMONE: CPT

## 2017-09-17 RX ADMIN — SODIUM CHLORIDE 1000 ML: 0.9 INJECTION, SOLUTION INTRAVENOUS at 11:09

## 2017-09-18 VITALS
WEIGHT: 108 LBS | SYSTOLIC BLOOD PRESSURE: 161 MMHG | DIASTOLIC BLOOD PRESSURE: 72 MMHG | RESPIRATION RATE: 18 BRPM | TEMPERATURE: 98 F | BODY MASS INDEX: 19.44 KG/M2 | OXYGEN SATURATION: 100 % | HEART RATE: 72 BPM

## 2017-09-18 LAB
TROPONIN I SERPL DL<=0.01 NG/ML-MCNC: <0.006 NG/ML
TSH SERPL DL<=0.005 MIU/L-ACNC: 0.8 UIU/ML

## 2017-09-18 NOTE — ED PROVIDER NOTES
Encounter Date: 9/17/2017    SCRIBE #1 NOTE: I, Chelsea Us, am scribing for, and in the presence of,  Dr. Espino. I have scribed the following portions of the note - the EKG reading and the Resident attestation.       History     Chief Complaint   Patient presents with    Fatigue     and abd pain      HPI   88 y.o. F BIB EMS (daughter phone called) for altered mental status this evening.  She has a history of dementia.  Her daughter (primary caretaker) said the patient usually wakes up to take her medication. However, when the daughter tried to wake the patient, this evening, pt was not arousable for 30 minutes.  Daughter then called EMS.  By the time EMS had arrived, patient had returned to baseline mental status.  At baseline, patient does not recognize her daughter, and is only oriented to self.  Per daughter, patient intermittently complains of abdominal pain, however does not describe any other symptoms.  This has been going on for a few days.  Daughter has not noticed any diarrhea, blood in the stool, foul-smelling urine, hematuria, dysuria.  No fevers.  No cough, no chest pain.  No distress.    Review of patient's allergies indicates:  No Known Allergies  Past Medical History:   Diagnosis Date    Diabetes mellitus     Diabetes mellitus type II     Diabetic neuropathy     Hyperlipidemia     Hypertension     Tuberculosis      Past Surgical History:   Procedure Laterality Date    left lung lobectomy for TB      removed at 20yrs old     Family History   Problem Relation Age of Onset    Hypertension Mother     Diabetes Mother     Diabetes Sister     Arthritis Brother     Hypertension Daughter     Diabetes Daughter     Diabetes Son     Hypertension Sister     Diabetes Daughter     Blindness Neg Hx     Glaucoma Neg Hx     Macular degeneration Neg Hx     Retinal detachment Neg Hx      Social History   Substance Use Topics    Smoking status: Former Smoker    Smokeless tobacco: Never Used       Comment: quit smoking over 30+ years    Alcohol use No     Review of Systems   Constitutional: Negative for chills, diaphoresis and fever.   HENT: Negative for drooling, ear discharge, facial swelling, nosebleeds, rhinorrhea, sore throat, trouble swallowing and voice change.    Eyes: Negative for pain and visual disturbance.   Respiratory: Negative for cough, chest tightness and shortness of breath.    Cardiovascular: Negative for chest pain, palpitations and leg swelling.   Gastrointestinal: Positive for abdominal pain. Negative for blood in stool, constipation, nausea and vomiting.   Genitourinary: Negative for difficulty urinating, dysuria, frequency, genital sores, hematuria, vaginal bleeding and vaginal discharge.   Musculoskeletal: Negative for back pain and neck stiffness.   Skin: Negative for color change, rash and wound.   Neurological: Negative for seizures, syncope, weakness, light-headedness and headaches.   All other systems reviewed and are negative.      Physical Exam     Initial Vitals [09/17/17 2018]   BP Pulse Resp Temp SpO2   130/60 62 18 98.2 °F (36.8 °C) 100 %      MAP       83.33         Physical Exam    Nursing note and vitals reviewed.  Constitutional: She appears well-developed. She is not diaphoretic. No distress.   Skin AAF, that is pleasant during interaction, non-combative, not delirious.   HENT:   Head: Normocephalic and atraumatic.   Nose: Nose normal.   Mouth/Throat: Oropharynx is clear and moist.   Eyes: Conjunctivae and EOM are normal. Pupils are equal, round, and reactive to light. Right eye exhibits no discharge. Left eye exhibits no discharge. No scleral icterus.   Neck: Normal range of motion. Neck supple.   Cardiovascular: Normal rate, normal heart sounds and intact distal pulses.   No murmur heard.  Pulses:       Dorsalis pedis pulses are 2+ on the right side, and 2+ on the left side.   Pulmonary/Chest: No respiratory distress. She has no wheezes. She has no rales. She  exhibits no tenderness.   Left chest with significant depression. Sounds decreased diffusely. (pt s/p lobectomy).    Abdominal: Soft. Normal appearance and bowel sounds are normal. She exhibits no distension. There is no tenderness.   Musculoskeletal: Normal range of motion. She exhibits no edema or tenderness.   Neurological: She is alert. She exhibits normal muscle tone.   Pt oriented to self, does not know where she is, does not know the year, and thinks that her daughter is her sister.   Strength 4/5 diffusely and symmetric.   Cerebellar exam normal (finger-to-nose, heel-to-shin). No pronator drift. No nystagmus.  Sensation to light touch intact in face and all extremities. DTRs 2+ (patellar, bicep, Achilles). CN II-XII grossly intact.       Skin: Skin is warm and dry. No pallor.         ED Course   Procedures  Labs Reviewed   CBC W/ AUTO DIFFERENTIAL - Abnormal; Notable for the following:        Result Value    Hemoglobin 11.4 (*)     Hematocrit 35.9 (*)     MCHC 31.8 (*)     All other components within normal limits   COMPREHENSIVE METABOLIC PANEL - Abnormal; Notable for the following:     Glucose 116 (*)     Albumin 2.9 (*)     ALT 7 (*)     All other components within normal limits   B-TYPE NATRIURETIC PEPTIDE - Abnormal; Notable for the following:      (*)     All other components within normal limits   CULTURE, BLOOD   CULTURE, BLOOD   APTT   LACTIC ACID, PLASMA   LIPASE   MAGNESIUM   PROTIME-INR   TROPONIN I   TSH   TYPE & SCREEN     Imaging Results          X-Ray Chest 1 View (Final result)  Result time 09/17/17 23:01:10    Final result by Devang Lynch MD (09/17/17 23:01:10)                 Impression:        No acute findings in the chest.  No significant change from prior.  Prior left pneumonectomy.        Electronically signed by: DEVANG LYNCH MD  Date:     09/17/17  Time:    23:01              Narrative:    Chest AP portable    Indication:.    Comparison:August 31, 2017.    Findings:      Chronic prior pneumonectomy changes on the left, stable.  Heart remains shifted to the left hemithorax stable.    Right lung appears unchanged when allowing for differences in technique and positioning.                             EKG Readings: (Independently Interpreted)   EKG: NSR, 1st degree AV block, no FERNANDO's or STD's, non-specific twave pattern, no STEMI      HO-II MDM  88 y.o. F BIB EMS for AMS, per daughter.  Exam shows a pleasant female who is oriented to self only.  Neurological exam nonfocal.  Patient is at baseline mental status per her daughter.  DDX includes: Dementia, sundowning syndrome, electrolyte abnormality, dehydration, orthostasis, delirium, fatigue.  We will perform a broad workup for altered mental status.  However, I do not suspect any significant findings.  This, and anticipate discharge home based  on my initial evaluation.  Pt is aware of plan and is amenable.     Dean Duke M.D.  [facility] EMERGENCY MEDICINE PGY-2  12:03 AM 09/18/2017    HO-II UPDATE  Pt remains unchanged in bed. Results reviewed, and are unremarkable and non-contributory. I did not anticipate any significant irregularities.  I explained to the family about the progressive memory loss and personality changes that accompany dementia.  Pt is aware of plan and is amenable.     Dean Duke M.D.  [facility] EMERGENCY MEDICINE PGY-2  12:46 AM 09/18/2017         Medical Decision Making:   History:   Old Medical Records: I decided to obtain old medical records.  Independently Interpreted Test(s):   I have ordered and independently interpreted EKG Reading(s) - see prior notes  Clinical Tests:   Lab Tests: Ordered and Reviewed  Radiological Study: Ordered and Reviewed  Medical Tests: Ordered and Reviewed            Scribe Attestation:   Scribe #1: I performed the above scribed service and the documentation accurately describes the services I performed. I attest to the accuracy of the note.    Attending Attestation:   Physician  Attestation Statement for Resident:  As the supervising MD   Physician Attestation Statement: I have personally seen and examined this patient.   I agree with the above history. -: Pt presents after being difficult to wake from sleep. Eventually she was aroused from sleep and was at normal baseline. EMS had already been called by family and they decided to bring her in and have her checked out. Labs, imaging, EKG unremarkble. Pt remained at normal baseline throughout her ER stay. The patient was given strict return precautions and follow up instructions and expressed understanding of these.  The patient felt comfortable with the plan for discharge and I did as well.  Stable for DC.      As the supervising MD I agree with the above PE.    As the supervising MD I agree with the above treatment, course, plan, and disposition.  I have reviewed the following: old records at this facility.          Physician Attestation for Scribe:  Physician Attestation Statement for Scribe #1: I, Dr. Espino, reviewed documentation, as scribed by Chelsea Us in my presence, and it is both accurate and complete.                 ED Course      Clinical Impression:   The primary encounter diagnosis was Transient alteration of awareness. A diagnosis of Fatigue was also pertinent to this visit.                           Luis E Espino MD  09/19/17 0205

## 2017-09-18 NOTE — ED NOTES
"Pt's daughter states "it took me 30 minutes to wake her up which is not usual". Pt is disoriented to her date of birth, times, and place. Pt denies pain, headache, change in vision, numbness, or SOB. Pt's daughter reports orientation of pt is unchanged.   "

## 2017-09-18 NOTE — ED NOTES
Patient identifiers verified and correct for Delfina Pelletier.    LOC: The patient is awake, alert and aware of environment with an appropriate affect, the patient is oriented x 3 and speaking appropriately.  APPEARANCE: Patient resting comfortably and in no acute distress, patient is clean and well groomed, patient's clothing is properly fastened.  SKIN: The skin is warm and dry, color consistent with ethnicity, patient has normal skin turgor and moist mucus membranes, skin intact, no breakdown or bruising noted.  MUSCULOSKELETAL: Patient moving all extremities spontaneously, no obvious swelling or deformities noted.  RESPIRATORY: Airway is open and patent, respirations are spontaneous, patient has a normal effort and rate, no accessory muscle use noted  CARDIAC: Patient has a normal rate and regular rhythm, no periphreal edema noted, capillary refill < 3 seconds.  ABDOMEN: Soft and non tender to palpation, no distention noted, normoactive bowel sounds present in all four quadrants.  NEUROLOGIC: PERRL, 3mm bilaterally, eyes open spontaneously, behavior appropriate to situation, follows commands, facial expression symmetrical, bilateral hand grasp equal and even, purposeful motor response noted, normal sensation in all extremities when touched with a finger.

## 2017-09-23 LAB
BACTERIA BLD CULT: NORMAL
BACTERIA BLD CULT: NORMAL

## 2017-10-22 DIAGNOSIS — F03.90 DEMENTIA WITHOUT BEHAVIORAL DISTURBANCE, UNSPECIFIED DEMENTIA TYPE: ICD-10-CM

## 2017-10-23 RX ORDER — DONEPEZIL HYDROCHLORIDE 10 MG/1
TABLET, FILM COATED ORAL
Qty: 90 TABLET | Refills: 1 | Status: SHIPPED | OUTPATIENT
Start: 2017-10-23 | End: 2018-01-19 | Stop reason: SDUPTHER

## 2017-12-29 DIAGNOSIS — E11.69 COMBINED HYPERLIPIDEMIA ASSOCIATED WITH TYPE 2 DIABETES MELLITUS: Chronic | ICD-10-CM

## 2017-12-29 DIAGNOSIS — E78.2 COMBINED HYPERLIPIDEMIA ASSOCIATED WITH TYPE 2 DIABETES MELLITUS: Chronic | ICD-10-CM

## 2017-12-29 RX ORDER — ROSUVASTATIN CALCIUM 20 MG/1
20 TABLET, COATED ORAL DAILY
Qty: 90 TABLET | Refills: 0 | Status: SHIPPED | OUTPATIENT
Start: 2017-12-29 | End: 2018-01-19 | Stop reason: SDUPTHER

## 2018-01-05 ENCOUNTER — OFFICE VISIT (OUTPATIENT)
Dept: FAMILY MEDICINE | Facility: CLINIC | Age: 83
End: 2018-01-05
Payer: MEDICARE

## 2018-01-05 VITALS
DIASTOLIC BLOOD PRESSURE: 50 MMHG | HEIGHT: 60 IN | TEMPERATURE: 98 F | BODY MASS INDEX: 19.47 KG/M2 | OXYGEN SATURATION: 99 % | SYSTOLIC BLOOD PRESSURE: 128 MMHG | WEIGHT: 99.19 LBS | HEART RATE: 107 BPM

## 2018-01-05 DIAGNOSIS — R04.0 ACUTE ANTERIOR EPISTAXIS: Primary | ICD-10-CM

## 2018-01-05 PROCEDURE — 99214 OFFICE O/P EST MOD 30 MIN: CPT | Mod: S$GLB,,, | Performed by: NURSE PRACTITIONER

## 2018-01-05 PROCEDURE — 99999 PR PBB SHADOW E&M-EST. PATIENT-LVL IV: CPT | Mod: PBBFAC,,, | Performed by: NURSE PRACTITIONER

## 2018-01-06 NOTE — PATIENT INSTRUCTIONS
Nosebleed (Adult)    Bleeding from the nose most commonly occurs because of injury or drying and cracking of the inner lining of the nose. Most nosebleeds are because of dry air or nose-picking. They can occur during a common cold or an allergy attack. They can also occur on a very hot day, or from dry air in the winter.  If the bleeding site is found, it may be cauterized. This means it is treated to cause a blood clot to form. This may be done with a chemical, heat, or electricity. If the bleeding continues after the site is cauterized, or if the site cannot be found, packing may be put in your nose. This is to apply pressure and stop the bleeding. The packing may be made of gauze or sponge. A small balloon catheter is sometimes used. These must be removed by your doctor. Some types of packing dissolve on their own.  Home care  · If packing was put in your nose, unless told otherwise, do not pull on it or try to remove it yourself. You will be given an appointment to have it removed. You may also have been given antibiotics to prevent a sinus infection. If so, finish all of the medicine.  · Do not blow your nose for 12 hours after the bleeding stops. This will allow a strong blood clot to form. Do not pick your nose. This may restart bleeding.  · Avoid drinking alcohol and hot liquids for the next 2 days. Alcohol or hot liquids in your mouth can dilate blood vessels in your nose. This can cause bleeding to start again.  · Do not take ibuprofen, naproxen, or medicines that contain aspirin. These thin the blood and may cause your nose to bleed. You may take acetaminophen for pain, unless another pain medicine was prescribed.  · If the bleeding starts again, sit up and lean forward to prevent swallowing blood. Pinch your nose tightly on both sides, as shown above, for 10 to 15 minutes. Time yourself. Dont release the pressure on your nose until 10 minutes is up. If bleeding does not stop, continue to pinch your  nose and call your healthcare provider or return to this facility.  · If you have a cold, allergies, or dry nasal membranes, lubricate the nasal passages. Apply a small amount of petroleum jelly inside the nose with a cotton swab twice a day (morning and night).  · Avoid overheating your home. This can dry the air and make your condition worse.  · Put a humidifier in the room where you sleep. This will add moisture to the air.  · Use a saline nasal spray to keep nasal passages moist.  · Do not pick your nose. Keep fingernails trimmed to decrease risk of bleeds.  · Do not smoke.  Follow-up care  Follow up with your healthcare provider, or as advised. Nasal packing should be rechecked or removed within 2 to 3 days.  When to seek medical advice  Call your healthcare provider right away if any of these occur.  · You have another nosebleed that you cannot control  · Dizziness, weakness, or fainting  · You become tired or confused  · Fever of 100.4ºF (38ºC) or higher, or as directed by your healthcare provider  · Headache  · Sinus or facial pain  · Shortness of breath or trouble breathing  Date Last Reviewed: 3/22/2015  © 8969-5604 [employer]. [address] [place] [address], [place] [address]. All rights reserved. This information is not intended as a substitute for professional medical care. Always follow your healthcare professional's instructions.

## 2018-01-06 NOTE — PROGRESS NOTES
History of Present Illness   Delfina Pelletier is a 89 y.o. woman with medical history as listed below who presents today with her daughter for evaluation of epistaxis x1 yesterday. Daughter states she had small amount of blood which lasted for about 5 minutes and resolved spontaneously. Daughter reports that she does rub her nose, blow her nose, and pick at her nose frequently. She is not taking blood thinners. Her blood pressure is within normal limits. She has no additional complaints and is otherwise healthy on today's visit.      Past Medical History:   Diagnosis Date    Diabetes mellitus     Diabetes mellitus type II     Diabetic neuropathy     Hyperlipidemia     Hypertension     Tuberculosis        Past Surgical History:   Procedure Laterality Date    left lung lobectomy for TB      removed at 20yrs old       Social History     Social History    Marital status:      Spouse name: N/A    Number of children: 4    Years of education: N/A     Occupational History    retired -       Social History Main Topics    Smoking status: Former Smoker    Smokeless tobacco: Never Used      Comment: quit smoking over 30+ years    Alcohol use No    Drug use: No    Sexual activity: No     Other Topics Concern    None     Social History Narrative    None       Family History   Problem Relation Age of Onset    Hypertension Mother     Diabetes Mother     Diabetes Sister     Arthritis Brother     Hypertension Daughter     Diabetes Daughter     Diabetes Son     Hypertension Sister     Diabetes Daughter     Blindness Neg Hx     Glaucoma Neg Hx     Macular degeneration Neg Hx     Retinal detachment Neg Hx        Review of Systems  Review of Systems   Unable to perform ROS: Dementia     A complete review of systems was otherwise negative.    Physical Exam  BP (!) 128/50   Pulse 107   Temp 97.8 °F (36.6 °C) (Oral)   Ht 5' (1.524 m)   Wt 45 kg (99 lb 3.3 oz)   SpO2 99%   BMI 19.38 kg/m²   General  appearance: alert, appears stated age, cooperative and no distress  Ears: normal TM's and external ear canals both ears  Nose: Nares normal. Septum midline. Mucosa normal. No drainage or sinus tenderness.  Throat: lips, mucosa, and tongue normal; teeth and gums normal  Neurologic: Grossly normal    Assessment/Plan  Acute anterior epistaxis  Likely from frequent blowing and picking.  I showed the daughter proper technique to apply pressure and stop epistaxis.  If epistaxis occurs and does not resolve, check blood pressure as it may be elevated.  If unable to stop with applying pressure, or if BP is elevated, present to ER.  May also try Vaseline inside the nares to lubricate the nares.  RTC PRN for persistent or worsening symptoms.    Daughter has verbalized understanding and is in agreement with plan of care.    Return if symptoms worsen or fail to improve.

## 2018-01-18 NOTE — PROGRESS NOTES
This note was created by combination of typed  and Dragon dictation.  Transcription errors may be present.  If there are any questions, please contact me.    Assessment & Plan  Type 2 diabetes mellitus without complication, without long-term current use of insulin- A1c has been consistently < 5.5.  Stop the metformin.    Combined hyperlipidemia associated with type 2 diabetes mellitus - refilled rosuvastatin.  Last lipid on record was good. No change.  -     rosuvastatin (CRESTOR) 20 MG tablet; Take 1 tablet (20 mg total) by mouth once daily.  Dispense: 90 tablet; Refill: 3    Dementia without behavioral disturbance, unspecified dementia type - stable on aricept refilled.  -     donepezil (ARICEPT) 10 MG tablet; Take 1 tablet (10 mg total) by mouth once daily.  Dispense: 90 tablet; Refill: 3    Hypertension associated with diabetes - stable refilled nifedipine.  May consider reducing or eliminating next visit.  -     NIFEdipine (NIFEDICAL XL) 60 MG (OSM) 24 hr tablet; Take 1 tablet (60 mg total) by mouth once daily.  Dispense: 90 tablet; Refill: 3    Needs flu shot  -     Influenza - High Dose (65+) (PF) (IM)    Dysuria - strong odor per daughter.  Urinary incontinence.  Check UA and UCx.  -     Urinalysis; Future; Expected date: 01/19/2018  -     Urine culture; Future; Expected date: 01/19/2018    Other orders  -     Cancel: metFORMIN (GLUCOPHAGE) 500 MG tablet; Take 1 tablet (500 mg total) by mouth 2 (two) times daily with meals.  Dispense: 180 tablet; Refill: 1        Medications Discontinued During This Encounter   Medication Reason    amoxicillin-clavulanate 500-125mg (AUGMENTIN) 500-125 mg Tab Therapy completed       Follow-up: No Follow-up on file. OV 6 month recall entered.  Consider reducing/eliminating BP medication.      =================================================================      Chief Complaint   Patient presents with    [facility]    Medication Refill       LALI Mathis is a  89 y.o. female, last appointment with this clinic was 1/5/2018.    No LMP recorded. Patient has had a hysterectomy.    Former pt of Dr. Gutiérrez.  DM2 with neuropathy; metformin  HTN; nifedipine  Hyperlipidemia; rosuvastatin  Urinary incontinence  Vit D deficiency  Dementia, aricept.  Hx of left pneumonectomy.  3/25/2013 colonoscopy severe diverticulosis.  6/15/2017 DEXA with osteoporosis. Hx of bisphosphonate. Discontinued with workup for weight loss.  Weight loss.  Subsequently dx'd with UTI.    9/1/2017 a1c 5.1.    Daughter notes her urine odor is still strong. Daughter notes fecal and urinary incontinence.  Sleeps OK, more in the day than at night. Appetite not great.    Daughter checks sugars in the morning and notes sometimes high. Sometimes pt snacks at night.     Patient Care Team:  Dimitri Castellanos MD as PCP - General (Internal Medicine)    Patient Active Problem List    Diagnosis Date Noted    Anorexia 08/31/2017    Dehydration 08/31/2017    Age-related osteoporosis without current pathological fracture 06/22/2017    Dementia without behavioral disturbance 02/04/2016    Essential hypertension 09/17/2014    Type 2 diabetes mellitus without complication, without long-term current use of insulin 09/17/2014    Weight loss 02/14/2013    Diabetes mellitus with neuropathy 10/22/2012    DJD (degenerative joint disease) of lumbar spine 10/22/2012    Diabetic neuropathy 10/22/2012    Abdominal pain, other specified site 10/22/2012    UTI (urinary tract infection) 08/02/2012       PAST MEDICAL HISTORY:  Past Medical History:   Diagnosis Date    Diabetes mellitus     Diabetes mellitus type II     Diabetic neuropathy     Hyperlipidemia     Hypertension     Tuberculosis        PAST SURGICAL HISTORY:  Past Surgical History:   Procedure Laterality Date    left lung lobectomy for TB      removed at 20yrs old       SOCIAL HISTORY:  Social History     Social History    Marital status:      Spouse name:  N/A    Number of children: 4    Years of education: N/A     Occupational History    retired -       Social History Main Topics    Smoking status: Former Smoker     Quit date: 1/19/1988    Smokeless tobacco: Never Used    Alcohol use No    Drug use: No    Sexual activity: No     Other Topics Concern    Not on file     Social History Narrative    No narrative on file       ALLERGIES AND MEDICATIONS: updated and reviewed.  Review of patient's allergies indicates:  No Known Allergies  Current Outpatient Prescriptions   Medication Sig Dispense Refill    blood sugar diagnostic Strp 1 each by Misc.(Non-Drug; Combo Route) route once daily. True Metrix Glucometer Test Strips  Test blood sugar once daily 100 each 1    blood-glucose meter kit Use as instructed 1 each 0    docusate sodium (STOOL SOFTENER) 250 MG capsule Take 1 capsule (250 mg total) by mouth once daily.      donepezil (ARICEPT) 10 MG tablet take 1 tablet by mouth once daily 90 tablet 1    lancets Misc 1 each by Misc.(Non-Drug; Combo Route) route once daily. TRUE METRIX METER 100 each 1    metformin (GLUCOPHAGE) 500 MG tablet Take 1 tablet (500 mg total) by mouth 2 (two) times daily with meals. 180 tablet 1    nifedipine (NIFEDICAL XL) 60 MG (OSM) 24 hr tablet Take 1 tablet (60 mg total) by mouth once daily. 90 tablet 1    polyethylene glycol (MIRALAX) 17 gram/dose powder Take 17 g by mouth once daily. 238 g 0    rosuvastatin (CRESTOR) 20 MG tablet Take 1 tablet (20 mg total) by mouth once daily. 90 tablet 0     No current facility-administered medications for this visit.        Review of Systems   Constitutional: Negative for chills and fever.   Gastrointestinal: Negative for abdominal pain.       Physical Exam   Vitals:    01/19/18 0833 01/19/18 0851   BP: (!) 148/74 110/70   BP Location:  Left arm   Patient Position:  Sitting   BP Method:  Medium (Manual)   Pulse: 94    Temp: 98 °F (36.7 °C)    SpO2: 98%    Weight: 41.5 kg (91 lb 7.9 oz)     Height: 5' (1.524 m)     Body mass index is 17.87 kg/m².  Weight: 41.5 kg (91 lb 7.9 oz)   Height: 5' (152.4 cm)     Physical Exam   Constitutional: She appears well-developed and well-nourished. No distress.   Eyes: EOM are normal.   Cardiovascular: Normal rate, regular rhythm and normal heart sounds.    No murmur heard.  2/6 systolic murmur in apex S1s2     Pulmonary/Chest: Effort normal.   Decreased breath sounds on the left with pronounced heart sounds in that side   Musculoskeletal: Normal range of motion. She exhibits no edema.   Neurological: She is alert. Coordination normal.   Skin: Skin is warm and dry.   Psychiatric:   Flat affect.  Appropriate responses to questions but one word answers.

## 2018-01-19 ENCOUNTER — OFFICE VISIT (OUTPATIENT)
Dept: FAMILY MEDICINE | Facility: CLINIC | Age: 83
End: 2018-01-19
Payer: MEDICARE

## 2018-01-19 VITALS
OXYGEN SATURATION: 98 % | HEART RATE: 94 BPM | SYSTOLIC BLOOD PRESSURE: 110 MMHG | DIASTOLIC BLOOD PRESSURE: 70 MMHG | WEIGHT: 91.5 LBS | HEIGHT: 60 IN | TEMPERATURE: 98 F | BODY MASS INDEX: 17.96 KG/M2

## 2018-01-19 DIAGNOSIS — E11.59 HYPERTENSION ASSOCIATED WITH DIABETES: Chronic | ICD-10-CM

## 2018-01-19 DIAGNOSIS — F03.90 DEMENTIA WITHOUT BEHAVIORAL DISTURBANCE, UNSPECIFIED DEMENTIA TYPE: ICD-10-CM

## 2018-01-19 DIAGNOSIS — E78.2 COMBINED HYPERLIPIDEMIA ASSOCIATED WITH TYPE 2 DIABETES MELLITUS: Chronic | ICD-10-CM

## 2018-01-19 DIAGNOSIS — Z23 NEEDS FLU SHOT: ICD-10-CM

## 2018-01-19 DIAGNOSIS — E11.69 COMBINED HYPERLIPIDEMIA ASSOCIATED WITH TYPE 2 DIABETES MELLITUS: Chronic | ICD-10-CM

## 2018-01-19 DIAGNOSIS — E11.9 TYPE 2 DIABETES MELLITUS WITHOUT COMPLICATION, WITHOUT LONG-TERM CURRENT USE OF INSULIN: Primary | ICD-10-CM

## 2018-01-19 DIAGNOSIS — I15.2 HYPERTENSION ASSOCIATED WITH DIABETES: Chronic | ICD-10-CM

## 2018-01-19 DIAGNOSIS — R30.0 DYSURIA: ICD-10-CM

## 2018-01-19 PROCEDURE — 99214 OFFICE O/P EST MOD 30 MIN: CPT | Mod: S$GLB,,, | Performed by: INTERNAL MEDICINE

## 2018-01-19 PROCEDURE — G0008 ADMIN INFLUENZA VIRUS VAC: HCPCS | Mod: S$GLB,,, | Performed by: INTERNAL MEDICINE

## 2018-01-19 PROCEDURE — 99999 PR PBB SHADOW E&M-EST. PATIENT-LVL III: CPT | Mod: PBBFAC,,, | Performed by: INTERNAL MEDICINE

## 2018-01-19 PROCEDURE — 90662 IIV NO PRSV INCREASED AG IM: CPT | Mod: S$GLB,,, | Performed by: INTERNAL MEDICINE

## 2018-01-19 RX ORDER — DONEPEZIL HYDROCHLORIDE 10 MG/1
10 TABLET, FILM COATED ORAL DAILY
Qty: 90 TABLET | Refills: 3 | Status: SHIPPED | OUTPATIENT
Start: 2018-01-19

## 2018-01-19 RX ORDER — ROSUVASTATIN CALCIUM 20 MG/1
20 TABLET, COATED ORAL DAILY
Qty: 90 TABLET | Refills: 3 | Status: SHIPPED | OUTPATIENT
Start: 2018-01-19

## 2018-01-19 RX ORDER — NIFEDIPINE 60 MG/1
60 TABLET, EXTENDED RELEASE ORAL DAILY
Qty: 90 TABLET | Refills: 3 | Status: ON HOLD | OUTPATIENT
Start: 2018-01-19 | End: 2018-02-24 | Stop reason: HOSPADM

## 2018-01-19 RX ORDER — METFORMIN HYDROCHLORIDE 500 MG/1
500 TABLET ORAL 2 TIMES DAILY WITH MEALS
Qty: 180 TABLET | Refills: 1 | Status: CANCELLED | OUTPATIENT
Start: 2018-01-19

## 2018-01-19 NOTE — PATIENT INSTRUCTIONS
CHECK SUGARS EVERY 3 DAYS.  IF SUGAR IS MORE THAN 150 FOR 3 DAYS, THEN RESTART THE METFORMIN AGAIN.

## 2018-01-19 NOTE — PROGRESS NOTES
Influenza vaccine administered, miri well. Instructed to wait 15mins for observation, no reaction noted @ time of discharge.

## 2018-01-26 ENCOUNTER — OFFICE VISIT (OUTPATIENT)
Dept: FAMILY MEDICINE | Facility: CLINIC | Age: 83
End: 2018-01-26
Payer: MEDICARE

## 2018-01-26 VITALS
DIASTOLIC BLOOD PRESSURE: 58 MMHG | WEIGHT: 95.69 LBS | SYSTOLIC BLOOD PRESSURE: 120 MMHG | TEMPERATURE: 98 F | HEART RATE: 94 BPM | OXYGEN SATURATION: 98 % | BODY MASS INDEX: 18.69 KG/M2

## 2018-01-26 DIAGNOSIS — E11.9 TYPE 2 DIABETES MELLITUS WITHOUT COMPLICATION, WITHOUT LONG-TERM CURRENT USE OF INSULIN: ICD-10-CM

## 2018-01-26 DIAGNOSIS — R53.81 PHYSICAL DECONDITIONING: Primary | ICD-10-CM
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
## 2018-01-26 DIAGNOSIS — R73.09 ABNORMAL GLUCOSE: ICD-10-CM

## 2018-01-26 PROCEDURE — 99999 PR PBB SHADOW E&M-EST. PATIENT-LVL III: CPT | Mod: PBBFAC,,, | Performed by: INTERNAL MEDICINE

## 2018-01-26 PROCEDURE — 99214 OFFICE O/P EST MOD 30 MIN: CPT | Mod: S$GLB,,, | Performed by: INTERNAL MEDICINE

## 2018-01-26 PROCEDURE — 99499 UNLISTED E&M SERVICE: CPT | Mod: S$GLB,,, | Performed by: INTERNAL MEDICINE

## 2018-01-26 RX ORDER — METFORMIN HYDROCHLORIDE 500 MG/1
500 TABLET ORAL 2 TIMES DAILY WITH MEALS
Qty: 180 TABLET | Refills: 1 | OUTPATIENT
Start: 2018-01-26

## 2018-01-26 NOTE — TELEPHONE ENCOUNTER
Spoke with daughter, she did not initiate refill request.  Unclear who did.  Not refilled.  Pt not on metformin any longer.

## 2018-02-08 ENCOUNTER — HOSPITAL ENCOUNTER (INPATIENT)
Facility: HOSPITAL | Age: 83
LOS: 6 days | Discharge: HOME HEALTH | DRG: 871 | End: 2018-02-14
Attending: EMERGENCY MEDICINE | Admitting: HOSPITALIST
Payer: MEDICARE

## 2018-02-08 DIAGNOSIS — R94.31 ST SEGMENT CHANGES ON ELECTROCARDIOGRAM: ICD-10-CM

## 2018-02-08 DIAGNOSIS — A41.89 SEVERE SEPSIS WITH ACUTE ORGAN DYSFUNCTION DUE TO GRAM POSITIVE BACTERIA: Primary | ICD-10-CM

## 2018-02-08 DIAGNOSIS — T50.905A PROLONGATION OF DRUG EFFECT: ICD-10-CM

## 2018-02-08 DIAGNOSIS — R65.20 SEVERE SEPSIS WITH ACUTE ORGAN DYSFUNCTION DUE TO GRAM POSITIVE BACTERIA: Primary | ICD-10-CM

## 2018-02-08 DIAGNOSIS — E87.0 HYPERNATREMIA: ICD-10-CM

## 2018-02-08 DIAGNOSIS — B96.20 BACTEREMIA DUE TO ESCHERICHIA COLI: ICD-10-CM

## 2018-02-08 DIAGNOSIS — R78.81 BACTEREMIA DUE TO ESCHERICHIA COLI: ICD-10-CM

## 2018-02-08 PROBLEM — L89.300 DECUBITUS ULCER OF BUTTOCK, UNSTAGEABLE: Status: ACTIVE | Noted: 2018-02-08

## 2018-02-08 PROBLEM — L89.312 DECUBITUS ULCER OF RIGHT BUTTOCK, STAGE 2: Status: ACTIVE | Noted: 2018-02-08

## 2018-02-08 PROBLEM — N17.9 AKI (ACUTE KIDNEY INJURY): Status: ACTIVE | Noted: 2018-02-08

## 2018-02-08 PROBLEM — G93.40 ENCEPHALOPATHY: Status: ACTIVE | Noted: 2018-02-08

## 2018-02-08 PROBLEM — R53.83 LETHARGY: Status: ACTIVE | Noted: 2018-02-08

## 2018-02-08 PROBLEM — A41.9 SEPSIS: Status: ACTIVE | Noted: 2018-02-08

## 2018-02-08 PROBLEM — L89.150 DECUBITUS ULCER OF SACRAL REGION, UNSTAGEABLE: Status: ACTIVE | Noted: 2018-02-08

## 2018-02-08 PROBLEM — R79.89 ELEVATED TROPONIN: Status: ACTIVE | Noted: 2018-02-08

## 2018-02-08 PROBLEM — L89.159 DECUBITUS ULCER OF SACRAL REGION: Status: ACTIVE | Noted: 2018-02-08

## 2018-02-08 PROBLEM — E87.20 LACTIC ACIDOSIS: Status: ACTIVE | Noted: 2018-02-08

## 2018-02-08 PROBLEM — N39.0 SEPSIS DUE TO URINARY TRACT INFECTION: Status: ACTIVE | Noted: 2018-02-08

## 2018-02-08 LAB
ALBUMIN SERPL BCP-MCNC: 2.4 G/DL
ALP SERPL-CCNC: 99 U/L
ALT SERPL W/O P-5'-P-CCNC: 12 U/L
ANION GAP SERPL CALC-SCNC: 10 MMOL/L
ANION GAP SERPL CALC-SCNC: 11 MMOL/L
ANION GAP SERPL CALC-SCNC: 8 MMOL/L
AST SERPL-CCNC: 23 U/L
BACTERIA #/AREA URNS AUTO: ABNORMAL /HPF
BASOPHILS # BLD AUTO: 0.03 K/UL
BASOPHILS NFR BLD: 0.2 %
BILIRUB SERPL-MCNC: 0.4 MG/DL
BILIRUB UR QL STRIP: NEGATIVE
BNP SERPL-MCNC: 109 PG/ML
BUN SERPL-MCNC: 30 MG/DL
BUN SERPL-MCNC: 34 MG/DL
BUN SERPL-MCNC: 39 MG/DL
BUN SERPL-MCNC: 42 MG/DL (ref 6–30)
CALCIUM SERPL-MCNC: 7.8 MG/DL
CALCIUM SERPL-MCNC: 8 MG/DL
CALCIUM SERPL-MCNC: 9.1 MG/DL
CAOX CRY UR QL COMP ASSIST: ABNORMAL
CHLORIDE SERPL-SCNC: 125 MMOL/L
CHLORIDE SERPL-SCNC: 126 MMOL/L
CHLORIDE SERPL-SCNC: 127 MMOL/L
CHLORIDE SERPL-SCNC: 127 MMOL/L (ref 95–110)
CLARITY UR REFRACT.AUTO: ABNORMAL
CO2 SERPL-SCNC: 24 MMOL/L
CO2 SERPL-SCNC: 27 MMOL/L
CO2 SERPL-SCNC: 28 MMOL/L
COLOR UR AUTO: YELLOW
CREAT SERPL-MCNC: 1.3 MG/DL
CREAT SERPL-MCNC: 1.5 MG/DL
CREAT SERPL-MCNC: 2 MG/DL
CREAT SERPL-MCNC: 2.1 MG/DL (ref 0.5–1.4)
DIFFERENTIAL METHOD: ABNORMAL
EOSINOPHIL # BLD AUTO: 0 K/UL
EOSINOPHIL NFR BLD: 0.1 %
ERYTHROCYTE [DISTWIDTH] IN BLOOD BY AUTOMATED COUNT: 13.8 %
EST. GFR  (AFRICAN AMERICAN): 25 ML/MIN/1.73 M^2
EST. GFR  (AFRICAN AMERICAN): 35.4 ML/MIN/1.73 M^2
EST. GFR  (AFRICAN AMERICAN): 42 ML/MIN/1.73 M^2
EST. GFR  (NON AFRICAN AMERICAN): 21.7 ML/MIN/1.73 M^2
EST. GFR  (NON AFRICAN AMERICAN): 30.7 ML/MIN/1.73 M^2
EST. GFR  (NON AFRICAN AMERICAN): 36.5 ML/MIN/1.73 M^2
ESTIMATED AVG GLUCOSE: 108 MG/DL
GLUCOSE SERPL-MCNC: 173 MG/DL
GLUCOSE SERPL-MCNC: 182 MG/DL (ref 70–110)
GLUCOSE SERPL-MCNC: 186 MG/DL
GLUCOSE SERPL-MCNC: 208 MG/DL
GLUCOSE UR QL STRIP: NEGATIVE
HBA1C MFR BLD HPLC: 5.4 %
HCT VFR BLD AUTO: 39.1 %
HCT VFR BLD CALC: 36 %PCV (ref 36–54)
HGB BLD-MCNC: 11.1 G/DL
HGB UR QL STRIP: ABNORMAL
HYALINE CASTS UR QL AUTO: 1 /LPF
IMM GRANULOCYTES # BLD AUTO: 0.1 K/UL
IMM GRANULOCYTES NFR BLD AUTO: 0.6 %
INR PPP: 1
KETONES UR QL STRIP: ABNORMAL
LACTATE SERPL-SCNC: 1.2 MMOL/L
LACTATE SERPL-SCNC: 2 MMOL/L
LEUKOCYTE ESTERASE UR QL STRIP: ABNORMAL
LIPASE SERPL-CCNC: 13 U/L
LYMPHOCYTES # BLD AUTO: 1.9 K/UL
LYMPHOCYTES NFR BLD: 12.1 %
MAGNESIUM SERPL-MCNC: 3 MG/DL
MCH RBC QN AUTO: 26.3 PG
MCHC RBC AUTO-ENTMCNC: 28.4 G/DL
MCV RBC AUTO: 93 FL
MICROSCOPIC COMMENT: ABNORMAL
MONOCYTES # BLD AUTO: 0.6 K/UL
MONOCYTES NFR BLD: 4.1 %
NEUTROPHILS # BLD AUTO: 12.8 K/UL
NEUTROPHILS NFR BLD: 82.9 %
NITRITE UR QL STRIP: NEGATIVE
NON-SQ EPI CELLS #/AREA URNS AUTO: 2 /HPF
NRBC BLD-RTO: 0 /100 WBC
PH UR STRIP: >8 [PH] (ref 5–8)
PLATELET # BLD AUTO: 333 K/UL
PMV BLD AUTO: 12.2 FL
POC IONIZED CALCIUM: 1.11 MMOL/L (ref 1.06–1.42)
POC TCO2 (MEASURED): 33 MMOL/L (ref 23–29)
POCT GLUCOSE: 179 MG/DL (ref 70–110)
POCT GLUCOSE: 181 MG/DL (ref 70–110)
POCT GLUCOSE: 243 MG/DL (ref 70–110)
POTASSIUM BLD-SCNC: 3.5 MMOL/L (ref 3.5–5.1)
POTASSIUM SERPL-SCNC: 2.8 MMOL/L
POTASSIUM SERPL-SCNC: 3 MMOL/L
POTASSIUM SERPL-SCNC: 3.6 MMOL/L
PROT SERPL-MCNC: 7.7 G/DL
PROT UR QL STRIP: ABNORMAL
PROTHROMBIN TIME: 10.5 SEC
RBC # BLD AUTO: 4.22 M/UL
RBC #/AREA URNS AUTO: 5 /HPF (ref 0–4)
SAMPLE: ABNORMAL
SODIUM BLD-SCNC: 167 MMOL/L (ref 136–145)
SODIUM SERPL-SCNC: 161 MMOL/L
SODIUM SERPL-SCNC: 161 MMOL/L
SODIUM SERPL-SCNC: 164 MMOL/L
SP GR UR STRIP: 1.01 (ref 1–1.03)
SQUAMOUS #/AREA URNS AUTO: 3 /HPF
T4 FREE SERPL-MCNC: 1.3 NG/DL
TROPONIN I SERPL DL<=0.01 NG/ML-MCNC: 0.09 NG/ML
TROPONIN I SERPL DL<=0.01 NG/ML-MCNC: 0.1 NG/ML
TROPONIN I SERPL DL<=0.01 NG/ML-MCNC: 0.11 NG/ML
TSH SERPL DL<=0.005 MIU/L-ACNC: 0.33 UIU/ML
URN SPEC COLLECT METH UR: ABNORMAL
UROBILINOGEN UR STRIP-ACNC: NEGATIVE EU/DL
WBC # BLD AUTO: 15.41 K/UL
WBC #/AREA URNS AUTO: 40 /HPF (ref 0–5)

## 2018-02-08 PROCEDURE — 87077 CULTURE AEROBIC IDENTIFY: CPT | Mod: 59

## 2018-02-08 PROCEDURE — 93005 ELECTROCARDIOGRAM TRACING: CPT

## 2018-02-08 PROCEDURE — 82962 GLUCOSE BLOOD TEST: CPT

## 2018-02-08 PROCEDURE — 85610 PROTHROMBIN TIME: CPT

## 2018-02-08 PROCEDURE — 84484 ASSAY OF TROPONIN QUANT: CPT

## 2018-02-08 PROCEDURE — 96365 THER/PROPH/DIAG IV INF INIT: CPT

## 2018-02-08 PROCEDURE — 83605 ASSAY OF LACTIC ACID: CPT

## 2018-02-08 PROCEDURE — 93010 ELECTROCARDIOGRAM REPORT: CPT | Mod: ,,, | Performed by: INTERNAL MEDICINE

## 2018-02-08 PROCEDURE — 25000003 PHARM REV CODE 250: Performed by: STUDENT IN AN ORGANIZED HEALTH CARE EDUCATION/TRAINING PROGRAM

## 2018-02-08 PROCEDURE — 87040 BLOOD CULTURE FOR BACTERIA: CPT | Mod: 59

## 2018-02-08 PROCEDURE — 87086 URINE CULTURE/COLONY COUNT: CPT

## 2018-02-08 PROCEDURE — 83735 ASSAY OF MAGNESIUM: CPT

## 2018-02-08 PROCEDURE — 11000001 HC ACUTE MED/SURG PRIVATE ROOM

## 2018-02-08 PROCEDURE — 87088 URINE BACTERIA CULTURE: CPT

## 2018-02-08 PROCEDURE — 83036 HEMOGLOBIN GLYCOSYLATED A1C: CPT

## 2018-02-08 PROCEDURE — 84439 ASSAY OF FREE THYROXINE: CPT

## 2018-02-08 PROCEDURE — 25000003 PHARM REV CODE 250: Performed by: EMERGENCY MEDICINE

## 2018-02-08 PROCEDURE — 84484 ASSAY OF TROPONIN QUANT: CPT | Mod: 91

## 2018-02-08 PROCEDURE — 96361 HYDRATE IV INFUSION ADD-ON: CPT

## 2018-02-08 PROCEDURE — 63600175 PHARM REV CODE 636 W HCPCS: Performed by: EMERGENCY MEDICINE

## 2018-02-08 PROCEDURE — 96366 THER/PROPH/DIAG IV INF ADDON: CPT

## 2018-02-08 PROCEDURE — 85025 COMPLETE CBC W/AUTO DIFF WBC: CPT

## 2018-02-08 PROCEDURE — 80048 BASIC METABOLIC PNL TOTAL CA: CPT | Mod: 91

## 2018-02-08 PROCEDURE — 99223 1ST HOSP IP/OBS HIGH 75: CPT | Mod: AI,GC,, | Performed by: HOSPITALIST

## 2018-02-08 PROCEDURE — 84443 ASSAY THYROID STIM HORMONE: CPT

## 2018-02-08 PROCEDURE — 99285 EMERGENCY DEPT VISIT HI MDM: CPT | Mod: ,,, | Performed by: EMERGENCY MEDICINE

## 2018-02-08 PROCEDURE — 83690 ASSAY OF LIPASE: CPT

## 2018-02-08 PROCEDURE — 99285 EMERGENCY DEPT VISIT HI MDM: CPT | Mod: 25

## 2018-02-08 PROCEDURE — 87184 SC STD DISK METHOD PER PLATE: CPT

## 2018-02-08 PROCEDURE — 83605 ASSAY OF LACTIC ACID: CPT | Mod: 91

## 2018-02-08 PROCEDURE — 63600175 PHARM REV CODE 636 W HCPCS: Performed by: STUDENT IN AN ORGANIZED HEALTH CARE EDUCATION/TRAINING PROGRAM

## 2018-02-08 PROCEDURE — 81001 URINALYSIS AUTO W/SCOPE: CPT

## 2018-02-08 PROCEDURE — 87186 SC STD MICRODIL/AGAR DIL: CPT

## 2018-02-08 PROCEDURE — 83880 ASSAY OF NATRIURETIC PEPTIDE: CPT

## 2018-02-08 PROCEDURE — 36415 COLL VENOUS BLD VENIPUNCTURE: CPT

## 2018-02-08 PROCEDURE — 80053 COMPREHEN METABOLIC PANEL: CPT

## 2018-02-08 RX ORDER — HEPARIN SODIUM,PORCINE/D5W 25000/250
17 INTRAVENOUS SOLUTION INTRAVENOUS CONTINUOUS
Status: DISCONTINUED | OUTPATIENT
Start: 2018-02-08 | End: 2018-02-08

## 2018-02-08 RX ORDER — DEXTROSE MONOHYDRATE 50 MG/ML
1000 INJECTION, SOLUTION INTRAVENOUS
Status: DISCONTINUED | OUTPATIENT
Start: 2018-02-08 | End: 2018-02-08

## 2018-02-08 RX ORDER — HEPARIN SODIUM 5000 [USP'U]/ML
5000 INJECTION, SOLUTION INTRAVENOUS; SUBCUTANEOUS EVERY 8 HOURS
Status: DISCONTINUED | OUTPATIENT
Start: 2018-02-09 | End: 2018-02-14 | Stop reason: HOSPADM

## 2018-02-08 RX ORDER — CEFTRIAXONE 1 G/1
1 INJECTION, POWDER, FOR SOLUTION INTRAMUSCULAR; INTRAVENOUS
Status: DISCONTINUED | OUTPATIENT
Start: 2018-02-09 | End: 2018-02-09

## 2018-02-08 RX ORDER — HEPARIN SODIUM 5000 [USP'U]/ML
5000 INJECTION, SOLUTION INTRAVENOUS; SUBCUTANEOUS EVERY 8 HOURS
Status: DISCONTINUED | OUTPATIENT
Start: 2018-02-08 | End: 2018-02-08

## 2018-02-08 RX ORDER — HEPARIN SODIUM 5000 [USP'U]/ML
5000 INJECTION, SOLUTION INTRAVENOUS; SUBCUTANEOUS EVERY 8 HOURS
Status: CANCELLED | OUTPATIENT
Start: 2018-02-08

## 2018-02-08 RX ORDER — DEXTROSE MONOHYDRATE 50 MG/ML
INJECTION, SOLUTION INTRAVENOUS CONTINUOUS
Status: DISCONTINUED | OUTPATIENT
Start: 2018-02-08 | End: 2018-02-09

## 2018-02-08 RX ORDER — INSULIN ASPART 100 [IU]/ML
0-5 INJECTION, SOLUTION INTRAVENOUS; SUBCUTANEOUS EVERY 6 HOURS PRN
Status: DISCONTINUED | OUTPATIENT
Start: 2018-02-08 | End: 2018-02-10

## 2018-02-08 RX ORDER — CEFTRIAXONE 1 G/1
1 INJECTION, POWDER, FOR SOLUTION INTRAMUSCULAR; INTRAVENOUS
Status: DISCONTINUED | OUTPATIENT
Start: 2018-02-08 | End: 2018-02-08

## 2018-02-08 RX ORDER — ROSUVASTATIN CALCIUM 20 MG/1
20 TABLET, COATED ORAL DAILY
Status: DISCONTINUED | OUTPATIENT
Start: 2018-02-08 | End: 2018-02-09

## 2018-02-08 RX ORDER — POTASSIUM CHLORIDE 7.45 MG/ML
80 INJECTION INTRAVENOUS ONCE
Status: DISCONTINUED | OUTPATIENT
Start: 2018-02-08 | End: 2018-02-08

## 2018-02-08 RX ORDER — GLUCAGON 1 MG
1 KIT INJECTION
Status: DISCONTINUED | OUTPATIENT
Start: 2018-02-08 | End: 2018-02-14 | Stop reason: HOSPADM

## 2018-02-08 RX ORDER — POLYETHYLENE GLYCOL 3350 17 G/17G
0.4 POWDER, FOR SOLUTION ORAL DAILY
Status: DISCONTINUED | OUTPATIENT
Start: 2018-02-08 | End: 2018-02-09

## 2018-02-08 RX ORDER — DONEPEZIL HYDROCHLORIDE 10 MG/1
10 TABLET, FILM COATED ORAL DAILY
Status: DISCONTINUED | OUTPATIENT
Start: 2018-02-08 | End: 2018-02-14 | Stop reason: HOSPADM

## 2018-02-08 RX ORDER — POTASSIUM CHLORIDE 7.45 MG/ML
10 INJECTION INTRAVENOUS
Status: DISPENSED | OUTPATIENT
Start: 2018-02-08 | End: 2018-02-09

## 2018-02-08 RX ORDER — SODIUM CHLORIDE 0.9 % (FLUSH) 0.9 %
3 SYRINGE (ML) INJECTION
Status: DISCONTINUED | OUTPATIENT
Start: 2018-02-08 | End: 2018-02-14 | Stop reason: HOSPADM

## 2018-02-08 RX ORDER — ENOXAPARIN SODIUM 100 MG/ML
40 INJECTION SUBCUTANEOUS EVERY 24 HOURS
Status: DISCONTINUED | OUTPATIENT
Start: 2018-02-08 | End: 2018-02-08

## 2018-02-08 RX ADMIN — DEXTROSE: 5 SOLUTION INTRAVENOUS at 01:02

## 2018-02-08 RX ADMIN — SODIUM CHLORIDE 1000 ML: 0.9 INJECTION, SOLUTION INTRAVENOUS at 12:02

## 2018-02-08 RX ADMIN — VANCOMYCIN HYDROCHLORIDE 500 MG: 500 INJECTION, POWDER, LYOPHILIZED, FOR SOLUTION INTRAVENOUS at 06:02

## 2018-02-08 RX ADMIN — POTASSIUM CHLORIDE 10 MEQ: 10 INJECTION, SOLUTION INTRAVENOUS at 08:02

## 2018-02-08 RX ADMIN — POTASSIUM CHLORIDE 10 MEQ: 10 INJECTION, SOLUTION INTRAVENOUS at 10:02

## 2018-02-08 RX ADMIN — POTASSIUM CHLORIDE 10 MEQ: 10 INJECTION, SOLUTION INTRAVENOUS at 11:02

## 2018-02-08 RX ADMIN — DEXTROSE 500 ML: 5 SOLUTION INTRAVENOUS at 10:02

## 2018-02-08 RX ADMIN — SODIUM CHLORIDE 500 ML: 0.9 INJECTION, SOLUTION INTRAVENOUS at 08:02

## 2018-02-08 RX ADMIN — CEFTRIAXONE SODIUM 2 G: 2 INJECTION, POWDER, FOR SOLUTION INTRAMUSCULAR; INTRAVENOUS at 10:02

## 2018-02-08 NOTE — MEDICAL/APP STUDENT
Subjective:         Patient ID: Delfina Pelletier is a 89 y.o. female.    Chief Complaint: Fatigue (not verbally responsive and not following commands; doesnt always talk, due to dementia )    Ms. Delfina Pelletier is an 89-year-old -American female with a past Hx of Alzheimer's Disease, HTN, and T2DM who presented to the ED at the request of her daughter due to acute altered mental status. Over the last 24 hours, her daughter Naomi who resides with Ms. Pelletier has noted that her mother has become increasingly lethargic and not verbally responsive. Over the last 24 hours, Ms. Pelletier mostly lay in bed and had a reduced appetite, refusing to eat more than a few bites of food, and drinking very little. She also has had a several day history of fever and chills, raising concerns about possible infective cause of her presentation. With regards to her communication, she has been speaking mostly in mumbles, raising concerns about her possible mental state. At present, her family denies cough, sore throat, or wheezing.  The family denies chest pain or palpitations. They also deny diarrhea, hematuria, or dysuria. The deny Ms. Pelletier being around any sick contacts.    Her presentation of acute altered mental status is similar to an episode she had in August of 2017, that required hospitalization. Then, she presented with acute AMS and was subsequently diagnosed with Strep. Viridans UTI. At baseline, Ms. Pelletier is able to ambulate with a walker and void on her own. She was once a very sociable person in her local community, but has had a decline in her mentation in the last two months. In this two month period, he has become increasingly forgetful, at times forgetting who her own children or sisters are. She also has had a reduced ability to communicate, using just simple sentences. This is likely due to progression of her AD.    *Ms. Pelletier due to her altered mental state was unable to elaborate on her current condition,  and history was determined per ED notes and per family who are at her bedside.      Review of Systems   Unable to perform ROS: Mental status change         Past Medical History:   Diagnosis Date    Diabetes mellitus     Diabetes mellitus type II     Diabetic neuropathy     Hyperlipidemia     Hypertension     Tuberculosis      Past Surgical History:   Procedure Laterality Date    left lung lobectomy for TB      removed at 20yrs old     Family History   Problem Relation Age of Onset    Hypertension Mother     Diabetes Mother     Diabetes Sister     Arthritis Brother     Hypertension Daughter     Diabetes Daughter     Diabetes Son     Hypertension Sister     Diabetes Daughter     Blindness Neg Hx     Glaucoma Neg Hx     Macular degeneration Neg Hx     Retinal detachment Neg Hx      Social History     Social History    Marital status:      Spouse name: N/A    Number of children: 4    Years of education: N/A     Occupational History    retired -       Social History Main Topics    Smoking status: Former Smoker     Quit date: 1/19/1988    Smokeless tobacco: Never Used    Alcohol use No    Drug use: No    Sexual activity: No     Other Topics Concern    Not on file     Social History Narrative    No narrative on file     Scheduled Meds:   [START ON 2/9/2018] cefTRIAXone (ROCEPHIN) IVPB  1 g Intravenous Q24H    docusate sodium  250 mg Oral Daily    donepezil  10 mg Oral Daily    heparin (porcine)  5,000 Units Subcutaneous Q8H    polyethylene glycol  0.4 g/kg Oral Daily    rosuvastatin  20 mg Oral Daily     Continuous Infusions:   dextrose 5 % 50 mL/hr at 02/08/18 1356     PRN Meds:.dextrose 50%, glucagon (human recombinant), insulin aspart, sodium chloride 0.9%               Review of patient's allergies indicates:  No Known Allergies    OBJECTIVE:    Vitals  02/07 0700  02/08 0659 Range          Current   Temp (°F) 97.5 97.5 (36.4)   Pulse  75   Resp 14 14   BP  90//72 136/62   MAP (mmHg)  103   SpO2 (%)  98   Weight (kg) 40.8 40.8 kg (90 lb)       The patient weights 40.8 kg and has a BMI of    Physical Exam   Constitutional: She appears lethargic.   HENT:   Head: Normocephalic and atraumatic.   Eyes: Lids are normal.   Arcus senilus  Unable to access extra-ocular eye movements   Patient could not keep eyes open to access reactivity to light   Cardiovascular: Normal rate and regular rhythm.    Hard to palpate distal pulses   Pulmonary/Chest: No accessory muscle usage. No respiratory distress. She has decreased breath sounds. She has no wheezes.   Abdominal: Soft. Bowel sounds are normal. She exhibits no fluid wave. There is no tenderness. There is no rigidity and no guarding.   Musculoskeletal:   Patient has a lateral decubitus ulcer on left gluteal muscle   Neurological: She appears lethargic. She is disoriented. She displays atrophy.   Not oriented to time, or place, does respond to name  Could squeeze hands with weak strength  Could open eyes on command   Skin: Skin is dry and intact.   The patient had cold extremities, cold hands and feet  Dry mucus membranes         Labs  Recent Results (from the past 24 hour(s))   POCT glucose    Collection Time: 02/08/18  8:17 AM   Result Value Ref Range    POCT Glucose 181 (H) 70 - 110 mg/dL   ISTAT PROCEDURE    Collection Time: 02/08/18  8:25 AM   Result Value Ref Range    POC Glucose 182 (H) 70 - 110 mg/dL    POC BUN 42 (H) 6 - 30 mg/dL    POC Creatinine 2.1 (H) 0.5 - 1.4 mg/dL    POC Sodium 167 (HH) 136 - 145 mmol/L    POC Potassium 3.5 3.5 - 5.1 mmol/L    POC Chloride 127 (H) 95 - 110 mmol/L    POC TCO2 (MEASURED) 33 (H) 23 - 29 mmol/L    POC Ionized Calcium 1.11 1.06 - 1.42 mmol/L    POC Hematocrit 36 36 - 54 %PCV    Sample GIANFRANCO    CBC auto differential    Collection Time: 02/08/18  8:27 AM   Result Value Ref Range    WBC 15.41 (H) 3.90 - 12.70 K/uL    RBC 4.22 4.00 - 5.40 M/uL    Hemoglobin 11.1 (L) 12.0 -  16.0 g/dL    Hematocrit 39.1 37.0 - 48.5 %    MCV 93 82 - 98 fL    MCH 26.3 (L) 27.0 - 31.0 pg    MCHC 28.4 (L) 32.0 - 36.0 g/dL    RDW 13.8 11.5 - 14.5 %    Platelets 333 150 - 350 K/uL    MPV 12.2 9.2 - 12.9 fL    Immature Granulocytes 0.6 (H) 0.0 - 0.5 %    Gran # (ANC) 12.8 (H) 1.8 - 7.7 K/uL    Immature Grans (Abs) 0.10 (H) 0.00 - 0.04 K/uL    Lymph # 1.9 1.0 - 4.8 K/uL    Mono # 0.6 0.3 - 1.0 K/uL    Eos # 0.0 0.0 - 0.5 K/uL    Baso # 0.03 0.00 - 0.20 K/uL    nRBC 0 0 /100 WBC    Gran% 82.9 (H) 38.0 - 73.0 %    Lymph% 12.1 (L) 18.0 - 48.0 %    Mono% 4.1 4.0 - 15.0 %    Eosinophil% 0.1 0.0 - 8.0 %    Basophil% 0.2 0.0 - 1.9 %    Differential Method Automated    Comprehensive metabolic panel    Collection Time: 02/08/18  8:27 AM   Result Value Ref Range    Sodium 164 (HH) 136 - 145 mmol/L    Potassium 3.6 3.5 - 5.1 mmol/L    Chloride 125 (H) 95 - 110 mmol/L    CO2 28 23 - 29 mmol/L    Glucose 186 (H) 70 - 110 mg/dL    BUN, Bld 39 (H) 8 - 23 mg/dL    Creatinine 2.0 (H) 0.5 - 1.4 mg/dL    Calcium 9.1 8.7 - 10.5 mg/dL    Total Protein 7.7 6.0 - 8.4 g/dL    Albumin 2.4 (L) 3.5 - 5.2 g/dL    Total Bilirubin 0.4 0.1 - 1.0 mg/dL    Alkaline Phosphatase 99 55 - 135 U/L    AST 23 10 - 40 U/L    ALT 12 10 - 44 U/L    Anion Gap 11 8 - 16 mmol/L    eGFR if African American 25.0 (A) >60 mL/min/1.73 m^2    eGFR if non  21.7 (A) >60 mL/min/1.73 m^2   Lactic acid, plasma    Collection Time: 02/08/18  8:27 AM   Result Value Ref Range    Lactate (Lactic Acid) 2.0 0.5 - 2.2 mmol/L   Protime-INR    Collection Time: 02/08/18  8:27 AM   Result Value Ref Range    Prothrombin Time 10.5 9.0 - 12.5 sec    INR 1.0 0.8 - 1.2   TSH    Collection Time: 02/08/18  8:27 AM   Result Value Ref Range    TSH 0.334 (L) 0.400 - 4.000 uIU/mL   Troponin I    Collection Time: 02/08/18  8:27 AM   Result Value Ref Range    Troponin I 0.104 (H) 0.000 - 0.026 ng/mL   B-Type natriuretic peptide (BNP)    Collection Time: 02/08/18  8:27 AM    Result Value Ref Range     (H) 0 - 99 pg/mL   Lipase    Collection Time: 02/08/18  8:27 AM   Result Value Ref Range    Lipase 13 4 - 60 U/L   Magnesium    Collection Time: 02/08/18  8:27 AM   Result Value Ref Range    Magnesium 3.0 (H) 1.6 - 2.6 mg/dL   T4, free    Collection Time: 02/08/18  8:27 AM   Result Value Ref Range    Free T4 1.30 0.71 - 1.51 ng/dL   Blood culture #1    Collection Time: 02/08/18  8:28 AM   Result Value Ref Range    Blood Culture, Routine No Growth to date    Blood culture #2    Collection Time: 02/08/18  8:36 AM   Result Value Ref Range    Blood Culture, Routine No Growth to date    Urinalysis, Reflex to Urine Culture Urine, Clean Catch    Collection Time: 02/08/18 10:06 AM   Result Value Ref Range    Specimen UA Urine, Catheterized     Color, UA Yellow Yellow, Straw, Marlin    Appearance, UA Cloudy (A) Clear    pH, UA >8.0 (A) 5.0 - 8.0    Specific Gravity, UA 1.010 1.005 - 1.030    Protein, UA 2+ (A) Negative    Glucose, UA Negative Negative    Ketones, UA Trace (A) Negative    Bilirubin (UA) Negative Negative    Occult Blood UA 1+ (A) Negative    Nitrite, UA Negative Negative    Urobilinogen, UA Negative <2.0 EU/dL    Leukocytes, UA 3+ (A) Negative   Urinalysis Microscopic    Collection Time: 02/08/18 10:06 AM   Result Value Ref Range    RBC, UA 5 (H) 0 - 4 /hpf    WBC, UA 40 (H) 0 - 5 /hpf    Bacteria, UA Many (A) None-Occ /hpf    Squam Epithel, UA 3 /hpf    Non-Squam Epith 2 (A) <1/hpf /hpf    Hyaline Casts, UA 1 0-1/lpf /lpf    Ca Oxalate Lidia, UA Occasional None-Moderate    Microscopic Comment SEE COMMENT    POCT glucose    Collection Time: 02/08/18  1:19 PM   Result Value Ref Range    POCT Glucose 243 (H) 70 - 110 mg/dL   Lactic acid, plasma    Collection Time: 02/08/18  2:57 PM   Result Value Ref Range    Lactate (Lactic Acid) 1.2 0.5 - 2.2 mmol/L   Troponin I    Collection Time: 02/08/18  2:57 PM   Result Value Ref Range    Troponin I 0.088 (H) 0.000 - 0.026 ng/mL   Basic  metabolic panel    Collection Time: 02/08/18  2:57 PM   Result Value Ref Range    Sodium 161 (HH) 136 - 145 mmol/L    Potassium 2.8 (L) 3.5 - 5.1 mmol/L    Chloride 127 (HH) 95 - 110 mmol/L    CO2 24 23 - 29 mmol/L    Glucose 208 (H) 70 - 110 mg/dL    BUN, Bld 34 (H) 8 - 23 mg/dL    Creatinine 1.5 (H) 0.5 - 1.4 mg/dL    Calcium 7.8 (L) 8.7 - 10.5 mg/dL    Anion Gap 10 8 - 16 mmol/L    eGFR if African American 35.4 (A) >60 mL/min/1.73 m^2    eGFR if non  30.7 (A) >60 mL/min/1.73 m^2     Results for orders placed or performed during the hospital encounter of 02/08/18   EKG 12-lead    Narrative    Test Reason : R07.9  Blood Pressure : 120/058 mmHG  Vent. Rate : 096 BPM     Atrial Rate : 096 BPM     P-R Int : 206 ms          QRS Dur : 088 ms      QT Int : 376 ms       P-R-T Axes : 101 071 131 degrees     QTc Int : 475 ms    Normal sinus rhythm  Voltage criteria for left ventricular hypertrophy  ST depression, consider subendocardial injury  Nonspecific T wave abnormality  Prolonged QT  Abnormal ECG  When compared with ECG of 17-SEP-2017 22:52,  ST now depressed in Inferior leads  ST now depressed in Anterior-lateral leads  Nonspecific T wave abnormality now evident in Anterior leads  Confirmed by GIGI ZHONG MD ([id number]) on 2/8/2018 3:08:39 PM    Referred By: CUCA PEÑA           Confirmed By:GIGI ZHONG MD       Imaging Results          CT Head Without Contrast (Final result)  Result time 02/08/18 10:58:57    Final result by Michell Saez MD (02/08/18 10:58:57)                 Impression:         No evidence of acute hemorrhage or major vascular distribution infarct.    Generalized cerebral volume loss with chronic microvascular ischemic changes and a remote right cerebellar infarct.    Chronic inflammatory changes of the partially visualized paranasal sinuses.  ______________________________________     Electronically signed by resident: MICHELL HOLLINGSWORTH MD  Date:      02/08/18  Time:    09:39            As the supervising and teaching physician, I personally reviewed the images and resident's interpretation and I agree with the findings.          Electronically signed by: MICHELL NAVARRETE MD  Date:     02/08/18  Time:    10:58              Narrative:    CT head without contrast    02/08/18 08:45:24    Accession# [id number]    CLINICAL INDICATION: 89 year old F with Confusion/delirium, altered LOC, unexplained       TECHNIQUE: Axial CT images obtained throughout the region of the head without the use of intravenous contrast. Axial, sagittal and coronal reconstructions were performed.    COMPARISON: CT head 11/25/04    FINDINGS:    Moderate degree of generalized volume loss. Configuration does not suggest hydrocephalus.    No evidence of acute hemorrhage. No acute major vascular distribution infarct. Remote right cerebellar infarct. Mild/moderate chronic microvascular ischemic change. No parenchymal mass or mass effect.    No extra-axial blood or fluid collection.    The osseous calvarium is intact. Hyperostosis frontalis.    Essentially complete opacification of the partially visualized left maxillary sinus with surrounding osseous thickening and sclerosis of chronic sinus disease. Patchy mucosal thickening in the ethmoid air cells. Periosteal thickening in the right sphenoid sinus also consistent with chronic inflammation. The bilateral mastoid air cells are clear.                             X-Ray Chest AP Portable (Final result)  Result time 02/08/18 09:03:57    Final result by Poli Varner MD (02/08/18 09:03:57)                 Impression:     No significant abnormality directly referable to the current history of chest pain is observed.  No significant detrimental interval change in the appearance of the chest since 9/17/17 is appreciated.      Electronically signed by: Poli Varner MD  Date:     02/08/18  Time:    09:03              Narrative:    Comparison is made to  9/17/17.    Complete opacification of the left hemithorax and deformity of the thoracic wall on this side is again observed, stable in appearance since the prior exam and consistent with prior pneumonectomy/thoracoplasty.  Mediastinal shift to the left is again seen, and the left cardiac border is completely obscured by the process in the left hemithorax.  Pulmonary vascularity in the right lung is normal, and the right lung is clear and free of significant airspace consolidation or volume loss.  No pleural fluid on the right.  No pneumothorax.  Calcification in the wall of the aortic arch is again observed, as is scoliosis convex to the left in the upper thoracic region.                            Assessment:       Ms. Pelletier is an 89-year-old female patient with a background Hx of HTN, T2DM, and AD presenting with increasing lethargy and incomprensible speech in the context of acute altered mental status, likely secondary to UTI, hypernatremia, and CHERI.  Plan:     1. Altered mental status  Could be due to several causes such  -UTI: urinalysis results were positive for elevated WBCs and bacteria, but cultures pending  -Electrolyte imbalance : hypernatremia (161)  -Stroke risk especially given age and frailty:  Less likely given CT results negative, lack of slurred speech, or hemiparalysis    2. Sepsis  -meets 2/4 SIRS Criteria, has QSOFA of 2pts making her high risk (+1 AMS, +1 SBP <100)  -Management:    Given IVF: 5% dextrose 30 cc/kg (1.5 L)   IV Ceftriaxone   Consider addition of vancomycin to cover decubitis ulcer   Pending blood cultures    3. Dehydration  Due to lowered PO intake  IVF given    4. Electrolyte Imbalance   Hypernatremia  -Has Na+ level of 164 likely in context of dehydration  -Has a free water deficit of 3.5L  -IVF given  -Continue to trend electrolytes Q8  Hypokalemia  Has K+ of 2.8,  Give IV potassium sulfate supplements   Continue to trend electrolytes Q8    5. UTI  Urinalysis results showed  WBCs and bacteria, but cultures pending  On Ceftriaxone    6. Lactic acidosis  -Can occur in the context of sepsis, ischemia, side effect of Metformin intake  -Most likely to sepsis  -Trending lactate  -IVF and IV antibiotics    7. CHERI  Most likely prerenal given BUN:Cr of 22.6   (BUN 34, Cr 1.5)  -likely secondary to sepsis and/or hypovolemic state  -Management:    IVF,Strict inputs and outputs, serial BMPs    8. Elevated troponin  -Need to rule out MI  -Troponin has decreased from 0.1 to 0.008  -EKG showed ST segment depression initially   -Need to trend troponins and repeat EKG for changes  -If ST changes remain, treat with acute coronary syndrome protocol    9. Lateral Decubitus Ulcer of Left Gluteal Muscle  Consult wound care for management     10. Diabetes   Last HbA1c level was 5.4  Estimated Average glucose is 108 so decent control  Continue Metformin 500 mg tablet BD    11. HTN  Holding Nifedipine due to sepsis

## 2018-02-08 NOTE — HPI
89 yr old female with PMHx dementia, HTN, HLD, DM 2 who presents to [facility] ED with chief complaint of fatigue. History obtained from family and chart review. Onset of fatigue yesterday morning. Family noted that patient was sleeping throughout the day, lethargic, and talking less. They assisted her back to bed but she was unable to get out of bed on her own and they decided to bring her to the ED. Associated with decreased PO intake the last few days. No fever, chills, chest pain, dyspnea, cough, vomiting, diarrhea, foul-smelling urine, hematuria, dysuria or distress noted. ROS positive for weight loss, declining mentation, constipation, and right buttock wound (appears worse than 4 wks ago, no blood, pus, or mal-odor noted.)    Patient admitted to observation in 08/2017 for dehydration with hypernatremia & UTI (viridians strep); around that time, patient eating less and losing weight. Patient also visited ED in 09/2017 for transient alteration in awareness; work-up negative and attributed to progressive dementia. History of UTIs with klebsiella, E. Coli, and viridians strep. Patient diagnosed with Alzheimer's disease 2 years ago; prior to that she had been socially active. Currently at baseline, patient is oriented only to self, she does not recognize daughter or sister (since 2 months ago.) She self-ambulates and self voids. Lives at home with daughter.

## 2018-02-08 NOTE — ED NOTES
"Family aware of patient room assignment on 9th floor. NAD noted at this time. Patient remains to answer "I dont know" to most questions. Respirations even and unlabored. Side rails x 2. Will continue to monitor  "

## 2018-02-08 NOTE — ASSESSMENT & PLAN NOTE
Likely secondary to decreased PO intake. Electrolyte abnormalities (Mg elevated at 3.0, hypernatremic 169) and hypotensive (SBP 90/50 on admit)    -1.5L NS in setting of sepsis  -Free water deficit 3.5L-started con't D5 in setting of hypernatremia  -monitor BP-responding well to fluid  -monitor electrolyte with frequent BMP

## 2018-02-08 NOTE — ED PROVIDER NOTES
"Encounter Date: 2/8/2018    SCRIBE #1 NOTE: I, Deandre Tabares, am scribing for, and in the presence of,  Dr. Oscar. I have scribed the following portions of the note - the Resident attestation.       History     Chief Complaint   Patient presents with    Fatigue     not verbally responsive and not following commands; doesnt always talk, due to dementia      Time patient was seen by the provider: 8:28 AM      The patient is a 89 y.o. female with co-morbidities including: HTN, HLD, DM II who presents to the ED with a complaint of fatigue. Per family, pt is no longer taking Metformin. She is normally able to ambulate with assistance and able to feed herself. Pt was asymptomatic last night at 6 pm. She was assisted to bed this morning, confused and very weak. Pt not able to ambulate today. Pt's family carried her to the car and brought her to ED for evaluation. Pt states that she is "alright." Per family, she has no c/o fever, CP or SOB.               Review of patient's allergies indicates:  No Known Allergies  Past Medical History:   Diagnosis Date    Diabetes mellitus     Diabetes mellitus type II     Diabetic neuropathy     Hyperlipidemia     Hypertension     Tuberculosis      Past Surgical History:   Procedure Laterality Date    left lung lobectomy for TB      removed at 20yrs old     Family History   Problem Relation Age of Onset    Hypertension Mother     Diabetes Mother     Diabetes Sister     Arthritis Brother     Hypertension Daughter     Diabetes Daughter     Diabetes Son     Hypertension Sister     Diabetes Daughter     Blindness Neg Hx     Glaucoma Neg Hx     Macular degeneration Neg Hx     Retinal detachment Neg Hx      Social History   Substance Use Topics    Smoking status: Former Smoker     Quit date: 1/19/1988    Smokeless tobacco: Never Used    Alcohol use No     Review of Systems   Unable to perform ROS: Mental status change       Physical Exam     Initial Vitals [02/08/18 " 0810]   BP Pulse Resp Temp SpO2   (!) 90/58 102 14 97.5 °F (36.4 °C) 99 %      MAP       68.67         Physical Exam    Nursing note and vitals reviewed.  Constitutional:   Thin frail elderly female in NAD   HENT:   Head: Normocephalic and atraumatic.   Eyes: Pupils are equal, round, and reactive to light.   Neck: Neck supple. No JVD present.   Cardiovascular: Normal rate and regular rhythm.   Pulmonary/Chest:   Decreased BS on left, no crepitation   Abdominal: Soft. She exhibits no distension. There is no tenderness. There is no rebound.   Musculoskeletal: She exhibits no edema.   Neurological:   Awake, tracks, only answers 'alright' when asked how she is feeling.   Skin: Skin is warm and dry.         ED Course   Procedures  Labs Reviewed   CBC W/ AUTO DIFFERENTIAL - Abnormal; Notable for the following:        Result Value    WBC 15.41 (*)     Hemoglobin 11.1 (*)     MCH 26.3 (*)     MCHC 28.4 (*)     Immature Granulocytes 0.6 (*)     Gran # (ANC) 12.8 (*)     Immature Grans (Abs) 0.10 (*)     Gran% 82.9 (*)     Lymph% 12.1 (*)     All other components within normal limits   COMPREHENSIVE METABOLIC PANEL - Abnormal; Notable for the following:     Sodium 164 (*)     Chloride 125 (*)     Glucose 186 (*)     BUN, Bld 39 (*)     Creatinine 2.0 (*)     Albumin 2.4 (*)     eGFR if  25.0 (*)     eGFR if non  21.7 (*)     All other components within normal limits   URINALYSIS, REFLEX TO URINE CULTURE - Abnormal; Notable for the following:     Appearance, UA Cloudy (*)     pH, UA >8.0 (*)     Protein, UA 2+ (*)     Ketones, UA Trace (*)     Occult Blood UA 1+ (*)     Leukocytes, UA 3+ (*)     All other components within normal limits    Narrative:     Preferred Collection Type->Urine, Clean Catch   TSH - Abnormal; Notable for the following:     TSH 0.334 (*)     All other components within normal limits   TROPONIN I - Abnormal; Notable for the following:     Troponin I 0.104 (*)     All other  components within normal limits   B-TYPE NATRIURETIC PEPTIDE - Abnormal; Notable for the following:      (*)     All other components within normal limits   MAGNESIUM - Abnormal; Notable for the following:     Magnesium 3.0 (*)     All other components within normal limits   URINALYSIS MICROSCOPIC - Abnormal; Notable for the following:     RBC, UA 5 (*)     WBC, UA 40 (*)     Bacteria, UA Many (*)     Non-Squam Epith 2 (*)     All other components within normal limits    Narrative:     Preferred Collection Type->Urine, Clean Catch   POCT GLUCOSE - Abnormal; Notable for the following:     POCT Glucose 181 (*)     All other components within normal limits   ISTAT PROCEDURE - Abnormal; Notable for the following:     POC Glucose 182 (*)     POC BUN 42 (*)     POC Creatinine 2.1 (*)     POC Sodium 167 (*)     POC Chloride 127 (*)     POC TCO2 (MEASURED) 33 (*)     All other components within normal limits   CULTURE, BLOOD   CULTURE, BLOOD   CULTURE, URINE   LACTIC ACID, PLASMA   PROTIME-INR   LIPASE   T4, FREE   POCT GLUCOSE MONITORING CONTINUOUS     EKG Readings: (Independently Interpreted)   Sinus rhythm, rate 96, ST depression lateral leads       X-Rays:   Independently Interpreted Readings:   Chest X-Ray: Opacified left lung field, rotated, no obvious infiltrate Rt lunge field   Head CT: Head CT prior cerebellar cva, no acute bleed     Medical Decision Making:   History:   Old Medical Records: I decided to obtain old medical records.  Initial Assessment:   88 y/o F altered mental status, generalized fatigue  No focal neurologic finding  Ddx: infection- UTI, urosepsis, pneumonia, CHERI, dehydration, electrolyte abnormality, ICH  Head CT  istat  Routine blood work including cbc, cmp lactate, tni, tsh, bnp, UA  500cc bolus NS  Independently Interpreted Test(s):   I have ordered and independently interpreted X-rays - see prior notes.  I have ordered and independently interpreted EKG Reading(s) - see prior  notes  Clinical Tests:   Lab Tests: Ordered and Reviewed  Radiological Study: Ordered and Reviewed  Medical Tests: Ordered and Reviewed            Scribe Attestation:   Scribe #1: I performed the above scribed service and the documentation accurately describes the services I performed. I attest to the accuracy of the note.    Attending Attestation:             Attending ED Notes:   10:00 AM  Elevated wbc. Opacified Rt lung field. Cloudy urine awaiting UA results  Rocephin ordered.  Hypernatremia- 500cc D5W infusion ordered.  Admit to medicine          ED Course      Clinical Impression:   The encounter diagnosis was Hypernatremia.    Disposition:   Disposition: Admitted  Condition: Fair                        Shazia Oscar MD  02/09/18 0742

## 2018-02-08 NOTE — ASSESSMENT & PLAN NOTE
On metformin 500mg BD at home. Last HbA1c 5.1% in 09/2017.    - on admit  -[facility] for now  -POCT accuchecks and FB with CMP

## 2018-02-08 NOTE — ASSESSMENT & PLAN NOTE
0.104 on admit, baseline normal. Also with ST depression on inferior leads of EKG. Likely secondary to demand ischemia in setting of dehydration induced hypotension. Also concern for MI in setting of changes on EKG. Rfs for MI include HTN, HLD, and age; patient has not history of MI or cardiac intervention. Does not complain of chest pain although currently not communication.    -trending Q6H  -HEART score 7, DANIEL score: 4  -Repeat troponin downtrending  -will obtain second EKG and if ST depression remains, will treat per ACS protocol for NSETMI

## 2018-02-08 NOTE — ASSESSMENT & PLAN NOTE
UA with 3+ leuks, 40 WBC, many bacteria    -s/p IV rocephin 2g in ED  -urine culture obtained  -will con't IV rocephin 1g Q24 hr

## 2018-02-08 NOTE — ED NOTES
LOC: The patient is lethargic, arouses to painful stimuli, oriented x 1 to self, and has delayed speaking.  APPEARANCE: Patient resting comfortably and in no acute distress, patient is clean and well groomed, patient's clothing is properly fastened.  SKIN: The skin is warm and dry, patient has normal skin turgor and moist mucus membranes, skin intact, no breakdown or brusing noted.  MUSCULOSKELETAL: Patient moving all extremities well, no obvious swelling or deformities noted.  RESPIRATORY: Airway is open and patent, respirations are spontaneous, patient has a normal effort and rate. Breath sounds are clear and equal bilaterally.  CARDIAC: Normal heart sounds. No peripheral edema.  ABDOMEN: Soft and non tender to palpation, no distention noted. Bowel sounds present.

## 2018-02-08 NOTE — SUBJECTIVE & OBJECTIVE
Past Medical History:   Diagnosis Date    Diabetes mellitus     Diabetes mellitus type II     Diabetic neuropathy     Hyperlipidemia     Hypertension     Tuberculosis        Past Surgical History:   Procedure Laterality Date    left lung lobectomy for TB      removed at 20yrs old       Review of patient's allergies indicates:  No Known Allergies    No current facility-administered medications on file prior to encounter.      Current Outpatient Prescriptions on File Prior to Encounter   Medication Sig    blood sugar diagnostic Strp 1 each by Misc.(Non-Drug; Combo Route) route once daily. True Metrix Glucometer Test Strips  Test blood sugar once daily    donepezil (ARICEPT) 10 MG tablet Take 1 tablet (10 mg total) by mouth once daily.    metformin (GLUCOPHAGE) 500 MG tablet Take 1 tablet (500 mg total) by mouth 2 (two) times daily with meals.    NIFEdipine (NIFEDICAL XL) 60 MG (OSM) 24 hr tablet Take 1 tablet (60 mg total) by mouth once daily.    rosuvastatin (CRESTOR) 20 MG tablet Take 1 tablet (20 mg total) by mouth once daily.    blood-glucose meter kit Use as instructed    docusate sodium (STOOL SOFTENER) 250 MG capsule Take 1 capsule (250 mg total) by mouth once daily.    lancets Misc 1 each by Misc.(Non-Drug; Combo Route) route once daily. TRUE METRIX METER    polyethylene glycol (MIRALAX) 17 gram/dose powder Take 17 g by mouth once daily.     Family History     Problem Relation (Age of Onset)    Arthritis Brother    Diabetes Mother, Sister, Daughter, Son, Daughter    Hypertension Mother, Daughter, Sister        Social History Main Topics    Smoking status: Former Smoker     Quit date: 1/19/1988    Smokeless tobacco: Never Used    Alcohol use No    Drug use: No    Sexual activity: No     Review of Systems   Unable to perform ROS: Dementia     Objective:     Vital Signs (Most Recent):  Temp: 97.5 °F (36.4 °C) (02/08/18 0810)  Pulse: 67 (02/08/18 1316)  Resp: 14 (02/08/18 0810)  BP: (!) 148/63  (02/08/18 1316)  SpO2: 100 % (02/08/18 1316) Vital Signs (24h Range):  Temp:  [97.5 °F (36.4 °C)] 97.5 °F (36.4 °C)  Pulse:  [] 67  Resp:  [14] 14  SpO2:  [95 %-100 %] 100 %  BP: ()/(57-65) 148/63     Weight: 40.8 kg (90 lb)  Body mass index is 17.58 kg/m².    Physical Exam   Constitutional:   cachectic   HENT:   Head: Normocephalic and atraumatic.   Dry mucous membranes   Eyes: Pupils are equal, round, and reactive to light.   Neck: JVD present.   Cardiovascular: Normal rate and regular rhythm.    Murmur (systolic murmur) heard.  Pulmonary/Chest: Effort normal and breath sounds normal.   Abdominal: Soft. Bowel sounds are normal.   Hypoactive bowel sounds   Neurological: She is alert.   -Awakens to voice, follows commands, moves extremities  -strength upper & lower extremities 4/5   Skin: Skin is warm and dry.         CRANIAL NERVES     CN III, IV, VI   Pupils are equal, round, and reactive to light.       Significant Labs:   CBC:   Recent Labs  Lab 02/08/18 0825 02/08/18 0827   WBC  --  15.41*   HGB  --  11.1*   HCT 36 39.1   PLT  --  333     CMP:   Recent Labs  Lab 02/08/18 0827   *   K 3.6   *   CO2 28   *   BUN 39*   CREATININE 2.0*   CALCIUM 9.1   PROT 7.7   ALBUMIN 2.4*   BILITOT 0.4   ALKPHOS 99   AST 23   ALT 12   ANIONGAP 11   EGFRNONAA 21.7*     Lactic Acid:   Recent Labs  Lab 02/08/18 0827   LACTATE 2.0     Troponin:   Recent Labs  Lab 02/08/18 0827   TROPONINI 0.104*       Significant Imaging:     Imaging Results          CT Head Without Contrast (Final result)  Result time 02/08/18 10:58:57    Final result by Balbir Saez MD (02/08/18 10:58:57)                 Impression:         No evidence of acute hemorrhage or major vascular distribution infarct.    Generalized cerebral volume loss with chronic microvascular ischemic changes and a remote right cerebellar infarct.    Chronic inflammatory changes of the partially visualized paranasal  sinuses.  ______________________________________     Electronically signed by resident: MICHELL HOLLINGSWORTH MD  Date:     02/08/18  Time:    09:39            As the supervising and teaching physician, I personally reviewed the images and resident's interpretation and I agree with the findings.          Electronically signed by: MICHELL NAVARRETE MD  Date:     02/08/18  Time:    10:58              Narrative:    CT head without contrast    02/08/18 08:45:24    Accession# [id number]    CLINICAL INDICATION: 89 year old F with Confusion/delirium, altered LOC, unexplained       TECHNIQUE: Axial CT images obtained throughout the region of the head without the use of intravenous contrast. Axial, sagittal and coronal reconstructions were performed.    COMPARISON: CT head 11/25/04    FINDINGS:    Moderate degree of generalized volume loss. Configuration does not suggest hydrocephalus.    No evidence of acute hemorrhage. No acute major vascular distribution infarct. Remote right cerebellar infarct. Mild/moderate chronic microvascular ischemic change. No parenchymal mass or mass effect.    No extra-axial blood or fluid collection.    The osseous calvarium is intact. Hyperostosis frontalis.    Essentially complete opacification of the partially visualized left maxillary sinus with surrounding osseous thickening and sclerosis of chronic sinus disease. Patchy mucosal thickening in the ethmoid air cells. Periosteal thickening in the right sphenoid sinus also consistent with chronic inflammation. The bilateral mastoid air cells are clear.                             X-Ray Chest AP Portable (Final result)  Result time 02/08/18 09:03:57    Final result by Poli Varner MD (02/08/18 09:03:57)                 Impression:     No significant abnormality directly referable to the current history of chest pain is observed.  No significant detrimental interval change in the appearance of the chest since 9/17/17 is appreciated.      Electronically  signed by: Poli Varner MD  Date:     02/08/18  Time:    09:03              Narrative:    Comparison is made to 9/17/17.    Complete opacification of the left hemithorax and deformity of the thoracic wall on this side is again observed, stable in appearance since the prior exam and consistent with prior pneumonectomy/thoracoplasty.  Mediastinal shift to the left is again seen, and the left cardiac border is completely obscured by the process in the left hemithorax.  Pulmonary vascularity in the right lung is normal, and the right lung is clear and free of significant airspace consolidation or volume loss.  No pleural fluid on the right.  No pneumothorax.  Calcification in the wall of the aortic arch is again observed, as is scoliosis convex to the left in the upper thoracic region.

## 2018-02-08 NOTE — ED NOTES
Pt was dirty of stool and urine! Cleaned patient up and changed brief of patient with help of family member.

## 2018-02-08 NOTE — ASSESSMENT & PLAN NOTE
Pressure ulcer right buttock. Appears worse than 4 weeks ago per family. No pus, blood, or mal-odor noted.    -ordered wound care, appreciate recs.

## 2018-02-08 NOTE — ASSESSMENT & PLAN NOTE
-Cr 2.0 on admit baseline 0.8. Likely pre-renal secondary to decreased fluid intake and sepsis.    -optimizing IVF  -trend with BMP  -urine studies if no improvement with IVF  -strict I/O  -avoid nephrotoxic agents

## 2018-02-08 NOTE — ED NOTES
Assumed care of patient.    Plan of care discussed and patient has no complaints or questions. Pt is in bed alert. Pt is resting comfortably and is in no acute distress. The bed is in low, locked position with side rails upx2. Family at bedside state that they will call nurse if they need assistance.

## 2018-02-08 NOTE — ASSESSMENT & PLAN NOTE
Na 169 on admit, likely secondary to dehydration in setting of decreased PO intake.    -total water deficint 3.5L  -started D5 drip  -trend BMP Q6H

## 2018-02-08 NOTE — ASSESSMENT & PLAN NOTE
Likely secondary to sepsis. Also possible ischemia in setting of hypovolemia.    -trending lactate

## 2018-02-08 NOTE — ED NOTES
Rounding completed on patient. Plan of care discussed with Hospital Medicine at the bedside with family. Pt is in bed asleep to responds to voice. Pt is resting comfortably and is in no acute distress. Respirations are even and unlabored. The bed is in low, locked position with side rails upx2. Pt family states they will call nurse if they need assistance.

## 2018-02-08 NOTE — ASSESSMENT & PLAN NOTE
2/4 SIRs (WBC 15, BP 90/50) with infectious source UTI. Lactate 2.0. CXR neg for PNA. Possible infection of decubitus wound.    -initial fluid rescucitation 30cc/kg-total 1.5L NS  -s/p IV ceftriaxone 2g in ED  -trending lactate  -blood cultures obtained  -urine culture obtained

## 2018-02-08 NOTE — ED TRIAGE NOTES
"Patient presents after being found lethargic by family. Patient opened eyes when placed on the bed, not following all commands. Per the family, she went to bed a 6pm and was "normal." Per family patient normally can walk some with assistance, knows her name, can sometimes feed herself but often needs help.   "

## 2018-02-08 NOTE — ASSESSMENT & PLAN NOTE
"89 yr old female presents to ED with increased lethargy and altered mental status in setting of sepsis with UTI, hypotension, hypernatremia, CHERI, and elevated troponin. Leading causes include infection with UTI although bacteremia and infected right buttock wound also concerning. Electrolyte abnormalities secondary to dehydration also likely contributing (hypernatremic 169.) Stroke possible (dubdural in elderly vs ischemic secondary to hypotension; patient was weak in legs but likely secondary to fatigue; CThead neg for acute hemorrhage or major vascular distribution.)    -see "sepsis" for details regarding plan of care  -see "dehydration" for details regarding plan of care    "

## 2018-02-09 PROBLEM — R05.9 COUGH: Status: ACTIVE | Noted: 2018-02-09

## 2018-02-09 PROBLEM — L89.159 SACRAL DECUBITUS ULCER: Status: RESOLVED | Noted: 2018-02-08 | Resolved: 2018-02-09

## 2018-02-09 PROBLEM — R23.9 ALTERATION IN SKIN INTEGRITY: Status: ACTIVE | Noted: 2018-02-09

## 2018-02-09 PROBLEM — A41.89 SEVERE SEPSIS WITH ACUTE ORGAN DYSFUNCTION DUE TO GRAM POSITIVE BACTERIA: Status: ACTIVE | Noted: 2018-02-08

## 2018-02-09 PROBLEM — R78.81 BACTEREMIA DUE TO GRAM-NEGATIVE BACTERIA: Status: ACTIVE | Noted: 2018-02-09

## 2018-02-09 PROBLEM — T14.8XXA DEEP TISSUE INJURY: Status: ACTIVE | Noted: 2018-02-09

## 2018-02-09 LAB
ALBUMIN SERPL BCP-MCNC: 2.2 G/DL
ALP SERPL-CCNC: 92 U/L
ALT SERPL W/O P-5'-P-CCNC: 11 U/L
ANION GAP SERPL CALC-SCNC: 10 MMOL/L
ANION GAP SERPL CALC-SCNC: 9 MMOL/L
AST SERPL-CCNC: 18 U/L
BASOPHILS # BLD AUTO: 0.02 K/UL
BASOPHILS NFR BLD: 0.1 %
BILIRUB SERPL-MCNC: 0.2 MG/DL
BUN SERPL-MCNC: 28 MG/DL
BUN SERPL-MCNC: 30 MG/DL
BUN SERPL-MCNC: 30 MG/DL
BUN SERPL-MCNC: 35 MG/DL
CALCIUM SERPL-MCNC: 8.3 MG/DL
CALCIUM SERPL-MCNC: 8.4 MG/DL
CALCIUM SERPL-MCNC: 8.5 MG/DL
CALCIUM SERPL-MCNC: 8.7 MG/DL
CHLORIDE SERPL-SCNC: 119 MMOL/L
CHLORIDE SERPL-SCNC: 124 MMOL/L
CHLORIDE SERPL-SCNC: 125 MMOL/L
CHLORIDE SERPL-SCNC: 126 MMOL/L
CO2 SERPL-SCNC: 25 MMOL/L
CO2 SERPL-SCNC: 27 MMOL/L
CREAT SERPL-MCNC: 1.3 MG/DL
CREAT SERPL-MCNC: 1.4 MG/DL
CREAT SERPL-MCNC: 1.5 MG/DL
CREAT SERPL-MCNC: 1.7 MG/DL
DIFFERENTIAL METHOD: ABNORMAL
EOSINOPHIL # BLD AUTO: 0.1 K/UL
EOSINOPHIL NFR BLD: 0.4 %
ERYTHROCYTE [DISTWIDTH] IN BLOOD BY AUTOMATED COUNT: 13.8 %
EST. GFR  (AFRICAN AMERICAN): 30.4 ML/MIN/1.73 M^2
EST. GFR  (AFRICAN AMERICAN): 35.4 ML/MIN/1.73 M^2
EST. GFR  (AFRICAN AMERICAN): 38.4 ML/MIN/1.73 M^2
EST. GFR  (AFRICAN AMERICAN): 42 ML/MIN/1.73 M^2
EST. GFR  (NON AFRICAN AMERICAN): 26.4 ML/MIN/1.73 M^2
EST. GFR  (NON AFRICAN AMERICAN): 30.7 ML/MIN/1.73 M^2
EST. GFR  (NON AFRICAN AMERICAN): 33.3 ML/MIN/1.73 M^2
EST. GFR  (NON AFRICAN AMERICAN): 36.5 ML/MIN/1.73 M^2
GLUCOSE SERPL-MCNC: 153 MG/DL
GLUCOSE SERPL-MCNC: 169 MG/DL
GLUCOSE SERPL-MCNC: 249 MG/DL
GLUCOSE SERPL-MCNC: 353 MG/DL
HCT VFR BLD AUTO: 34 %
HGB BLD-MCNC: 9.7 G/DL
IMM GRANULOCYTES # BLD AUTO: 0.08 K/UL
IMM GRANULOCYTES NFR BLD AUTO: 0.5 %
LYMPHOCYTES # BLD AUTO: 1.7 K/UL
LYMPHOCYTES NFR BLD: 11.6 %
MAGNESIUM SERPL-MCNC: 2.5 MG/DL
MCH RBC QN AUTO: 26.7 PG
MCHC RBC AUTO-ENTMCNC: 28.5 G/DL
MCV RBC AUTO: 94 FL
MONOCYTES # BLD AUTO: 0.9 K/UL
MONOCYTES NFR BLD: 6 %
NEUTROPHILS # BLD AUTO: 12 K/UL
NEUTROPHILS NFR BLD: 81.4 %
NRBC BLD-RTO: 0 /100 WBC
PHOSPHATE SERPL-MCNC: 3 MG/DL
PLATELET # BLD AUTO: 254 K/UL
PMV BLD AUTO: 12 FL
POCT GLUCOSE: 371 MG/DL (ref 70–110)
POTASSIUM SERPL-SCNC: 3.6 MMOL/L
POTASSIUM SERPL-SCNC: 3.7 MMOL/L
POTASSIUM SERPL-SCNC: 3.9 MMOL/L
POTASSIUM SERPL-SCNC: 3.9 MMOL/L
PROT SERPL-MCNC: 7.1 G/DL
RBC # BLD AUTO: 3.63 M/UL
SODIUM SERPL-SCNC: 154 MMOL/L
SODIUM SERPL-SCNC: 159 MMOL/L
SODIUM SERPL-SCNC: 159 MMOL/L
SODIUM SERPL-SCNC: 163 MMOL/L
VANCOMYCIN SERPL-MCNC: 8.4 UG/ML
WBC # BLD AUTO: 14.71 K/UL

## 2018-02-09 PROCEDURE — 80053 COMPREHEN METABOLIC PANEL: CPT

## 2018-02-09 PROCEDURE — 85025 COMPLETE CBC W/AUTO DIFF WBC: CPT

## 2018-02-09 PROCEDURE — S5010 5% DEXTROSE AND 0.45% SALINE: HCPCS | Performed by: STUDENT IN AN ORGANIZED HEALTH CARE EDUCATION/TRAINING PROGRAM

## 2018-02-09 PROCEDURE — 63600175 PHARM REV CODE 636 W HCPCS: Performed by: STUDENT IN AN ORGANIZED HEALTH CARE EDUCATION/TRAINING PROGRAM

## 2018-02-09 PROCEDURE — 80202 ASSAY OF VANCOMYCIN: CPT

## 2018-02-09 PROCEDURE — 36415 COLL VENOUS BLD VENIPUNCTURE: CPT

## 2018-02-09 PROCEDURE — 87040 BLOOD CULTURE FOR BACTERIA: CPT | Mod: 59

## 2018-02-09 PROCEDURE — 99232 SBSQ HOSP IP/OBS MODERATE 35: CPT | Mod: GC,,, | Performed by: HOSPITALIST

## 2018-02-09 PROCEDURE — 80048 BASIC METABOLIC PNL TOTAL CA: CPT | Mod: 91

## 2018-02-09 PROCEDURE — 11000001 HC ACUTE MED/SURG PRIVATE ROOM

## 2018-02-09 PROCEDURE — 25000003 PHARM REV CODE 250: Performed by: STUDENT IN AN ORGANIZED HEALTH CARE EDUCATION/TRAINING PROGRAM

## 2018-02-09 PROCEDURE — 84100 ASSAY OF PHOSPHORUS: CPT

## 2018-02-09 PROCEDURE — 92610 EVALUATE SWALLOWING FUNCTION: CPT

## 2018-02-09 PROCEDURE — 83735 ASSAY OF MAGNESIUM: CPT

## 2018-02-09 RX ORDER — POLYETHYLENE GLYCOL 3350 17 G/17G
17 POWDER, FOR SOLUTION ORAL DAILY
Status: DISCONTINUED | OUTPATIENT
Start: 2018-02-10 | End: 2018-02-14 | Stop reason: HOSPADM

## 2018-02-09 RX ORDER — CEFTRIAXONE 1 G/1
1 INJECTION, POWDER, FOR SOLUTION INTRAMUSCULAR; INTRAVENOUS
Status: DISCONTINUED | OUTPATIENT
Start: 2018-02-09 | End: 2018-02-11

## 2018-02-09 RX ORDER — DEXTROSE MONOHYDRATE AND SODIUM CHLORIDE 5; .45 G/100ML; G/100ML
INJECTION, SOLUTION INTRAVENOUS CONTINUOUS
Status: DISCONTINUED | OUTPATIENT
Start: 2018-02-09 | End: 2018-02-10

## 2018-02-09 RX ORDER — ROSUVASTATIN CALCIUM 10 MG/1
10 TABLET, COATED ORAL DAILY
Status: DISCONTINUED | OUTPATIENT
Start: 2018-02-10 | End: 2018-02-14 | Stop reason: HOSPADM

## 2018-02-09 RX ADMIN — DEXTROSE AND SODIUM CHLORIDE 1000 ML: 5; .45 INJECTION, SOLUTION INTRAVENOUS at 06:02

## 2018-02-09 RX ADMIN — HEPARIN SODIUM 5000 UNITS: 5000 INJECTION, SOLUTION INTRAVENOUS; SUBCUTANEOUS at 07:02

## 2018-02-09 RX ADMIN — HEPARIN SODIUM 5000 UNITS: 5000 INJECTION, SOLUTION INTRAVENOUS; SUBCUTANEOUS at 05:02

## 2018-02-09 RX ADMIN — CEFTRIAXONE SODIUM 1 G: 1 INJECTION, POWDER, FOR SOLUTION INTRAMUSCULAR; INTRAVENOUS at 11:02

## 2018-02-09 RX ADMIN — POTASSIUM CHLORIDE 10 MEQ: 10 INJECTION, SOLUTION INTRAVENOUS at 01:02

## 2018-02-09 RX ADMIN — POTASSIUM CHLORIDE 10 MEQ: 10 INJECTION, SOLUTION INTRAVENOUS at 02:02

## 2018-02-09 RX ADMIN — HEPARIN SODIUM 5000 UNITS: 5000 INJECTION, SOLUTION INTRAVENOUS; SUBCUTANEOUS at 11:02

## 2018-02-09 RX ADMIN — INSULIN ASPART 3 UNITS: 100 INJECTION, SOLUTION INTRAVENOUS; SUBCUTANEOUS at 11:02

## 2018-02-09 NOTE — ASSESSMENT & PLAN NOTE
2/4 SIRs (WBC 15, BP 90/50) with bacteremia and UTI on UA. CXR neg for PNA. Also risk of infection via decubitus wound. Initial fluid rescucitation 30cc/kg-total 1.5L Ns. IV ceftriaxone 2g in Ed. Initial blood culture with E. Coli & urine cultures with GNR, lactose . WBC & lactate trending down, remains afebrile, HD stable.    -con't ceftriaxone 1g Q24H  -follow up susceptibility of urine & blood cultures  -repeat blood cultures obtained.

## 2018-02-09 NOTE — HOSPITAL COURSE
Patient presents to ED with chief complaints of lethargy and altered mental status. On admit, patient is septic (WBC 15, hypotensive 90/50) with UTI on UA. Also hypernatremic (164), and CHERI with Cr 2.0 (baseline 1.0). Patient was given sepsis bolus and started on ceftriaxone for treatment of UTI. Patient was admitted to medicine team 4 and started on D5W for treatment of hypernatremia. Patient's blood culture and urine cultures from 2/8 were positive for E. Coli and IV ceftriaxone was changed to PO ciprofloxacin on 2/11 and patient has tolerated well. Family meeting on 2/13 which family wishes for patient to be discharged home on home health. Patient's code made DNR.

## 2018-02-09 NOTE — ASSESSMENT & PLAN NOTE
-Cr 2.0 on admit baseline 0.8. Likely pre-renal secondary to decreased fluid intake and sepsis. Improving with IVF.    -con't optimizing IVF  -trend with BMP  -urine studies if no improvement with IVF  -strict I/O  -avoid nephrotoxic agents

## 2018-02-09 NOTE — ASSESSMENT & PLAN NOTE
Cough noted overnight per daughter. SaO2 >92% overnight. Some crackles on exam. Considered pulmonary edema secondary to IVF. Obtained CXR-neg for acute change.     Will continue to monitor.

## 2018-02-09 NOTE — NURSING
JOAN Man contacted by lab in regards to patient's Sodium level being 161, which is the same level as previously drawn @ 1457. Will continue to monitor.

## 2018-02-09 NOTE — PROGRESS NOTES
IV to right AC removed r/t noted edema caused by infiltration.  Cont. Fluids stopped and MD notified.  Attempted x's 2 to placed PIV to left upper extremity without success.  MD notified and aware.  Pt family at bedside and is also aware of plan of care at this point.   Family was able to feed patient lunch, stated that patient was able to tolerate soft foods well but noticed pt was pocketing carrots and having difficult time with the more solid foods. IM 4 also notified

## 2018-02-09 NOTE — PLAN OF CARE
"CM to bedside - pt & sister present; pt sleeping therefore sister provided assessment info. Pt mostly independent prior to admit w/ only a glucometer in place, taty w/ Naomi melo. Family stating that pt was moving around the home w/ minimally to moderate assist from other family member (no DME). Pt is active w/ [facility] - likely to resume pending dispo needs.     CM provided patient anticipated EDILBERTO which will be update by nursing staff. Patient provided a Blue "My Health Packet" for d/c planning and health tool. Patient verbalized understanding.     02/09/18 1551   Discharge Assessment   Assessment Type Discharge Planning Assessment   Confirmed/corrected address and phone number on facesheet? Yes   Assessment information obtained from? Patient;Caregiver   Expected Length of Stay (days) 5   Communicated expected length of stay with patient/caregiver yes   Prior to hospitilization cognitive status: Unable to Assess   Prior to hospitalization functional status: Needs Assistance   Current cognitive status: Not Oriented to Time;Not Oriented to Place   Current Functional Status: Needs Assistance   Facility Arrived From: N/A   Lives With child(marianela), adult   Able to Return to Prior Arrangements unable to determine at this time (comments)   Is patient able to care for self after discharge? Unable to determine at this time (comments)   Who are your caregiver(s) and their phone number(s)? kameron Salgado [phone]   Patient's perception of discharge disposition home health   Readmission Within The Last 30 Days no previous admission in last 30 days   Patient currently being followed by outpatient case management? No   Patient currently receives any other outside agency services? Yes   Name and contact number of agency or person providing outside services [facility]   Is it the patient/care giver preference to resume care with the current outside agency? Yes   Equipment Currently Used at Home glucometer "   Do you have any problems affording any of your prescribed medications? No   Is the patient taking medications as prescribed? yes   Does the patient have transportation home? Yes   Transportation Available family or friend will provide   Dialysis Name and Scheduled days N/A   Does the patient receive services at the [facility]? No   Discharge Plan A Home with family;Home Health   Discharge Plan B Skilled Nursing Facility   Patient/Family In Agreement With Plan yes

## 2018-02-09 NOTE — ASSESSMENT & PLAN NOTE
On metformin 500mg BD at home. Last HbA1c 5.1% in 09/2017.    -hold metformin  -BG well-controlled  -[facility] for now  -POCT accuchecks and FB with CMP

## 2018-02-09 NOTE — PROGRESS NOTES
[facility] Hospital Medicine  Progress Note    Patient Name: Delfina Pelletier  MRN: [id number]  Patient Class: IP- Inpatient   Admission Date: 2/8/2018  Length of Stay: 1 days  Attending Physician: Lion Ramírez MD  Primary Care Provider: Dimitri Castellanos MD    [facility] Medicine Team: [facility] HOSP MED 4 Ruby Stratton MD    Subjective:     Principal Problem:Severe sepsis with acute organ dysfunction    HPI:  89 yr old female with PMHx dementia, HTN, HLD, DM 2 who presents to [facility] ED with chief complaint of fatigue. History obtained from family and chart review. Onset of fatigue yesterday morning. Family noted that patient was sleeping throughout the day, lethargic, and talking less. They assisted her back to bed but she was unable to get out of bed on her own and they decided to bring her to the ED. Associated with decreased PO intake the last few days. No fever, chills, chest pain, dyspnea, cough, vomiting, diarrhea, foul-smelling urine, hematuria, dysuria or distress noted. ROS positive for weight loss, declining mentation, constipation, and right buttock wound (appears worse than 4 wks ago, no blood, pus, or mal-odor noted.)    Patient admitted to observation in 08/2017 for dehydration with hypernatremia & UTI (viridians strep); around that time, patient eating less and losing weight. Patient also visited ED in 09/2017 for transient alteration in awareness; work-up negative and attributed to progressive dementia. History of UTIs with klebsiella, E. Coli, and viridians strep. Patient diagnosed with Alzheimer's disease 2 years ago; prior to that she had been socially active. Currently at baseline, patient is oriented only to self, she does not recognize daughter or sister (since 2 months ago.) She self-ambulates and self voids. Lives at home with daughter.    Hospital Course:  Patient presents to ED with chief complaints of lethargy and altered mental status. On admit, patient is septic (WBC 15,  hypotensive 90/50) with UTI on UA. Also hypernatremic (164), and CHERI with Cr 2.0 (baseline 1.0). In ED, patient is given 2g ceftriaxone and 1.5L NS. D5 drip started at rate of 50mL/hr. BMP ordered Q6H. On repeat BMPs, Na and CHERI slowly improving but K= 2.8 from 3.6. Given 80mEq IV potassium. Repeat BMP with improved potassium and Cr but Na elevated; increased rate of D5.     Interval History: Ms. Pelletier improved from yesterday; more arousable and responsive to voice. Daughter at bedside reports cough throughout night.     Review of Systems   Unable to perform ROS: Dementia     Objective:     Vital Signs (Most Recent):  Temp: 98.3 °F (36.8 °C) (02/09/18 1156)  Pulse: 95 (02/09/18 1156)  Resp: 16 (02/09/18 1156)  BP: 126/65 (02/09/18 1156)  SpO2: 96 % (02/09/18 1156) Vital Signs (24h Range):  Temp:  [97.7 °F (36.5 °C)-98.3 °F (36.8 °C)] 98.3 °F (36.8 °C)  Pulse:  [] 95  Resp:  [14-22] 16  SpO2:  [92 %-100 %] 96 %  BP: (126-163)/(61-72) 126/65     Weight: 40.8 kg (90 lb)  Body mass index is 17.58 kg/m².  No intake or output data in the 24 hours ending 02/09/18 1258   Physical Exam   Constitutional: No distress.   cachectic   HENT:   Head: Normocephalic and atraumatic.   Eyes: Pupils are equal, round, and reactive to light.   Neck: Normal range of motion.   Cardiovascular: Normal rate, regular rhythm and normal heart sounds.    Pulmonary/Chest: Effort normal. She has rales.   Abdominal: Soft. There is no tenderness.   Hypoactive bowel sounds   Neurological:   -Opens eyes to voice; follows commands; minimally verbally responsive; less drowsy   Skin: Skin is dry. No erythema.   -Hands cool; bilateral lower extremities warm       Significant Labs:   CBC:   Recent Labs  Lab 02/08/18  0825 02/08/18  0827 02/09/18  0204   WBC  --  15.41* 14.71*   HGB  --  11.1* 9.7*   HCT 36 39.1 34.0*   PLT  --  333 254     CMP:   Recent Labs  Lab 02/08/18  0827  02/08/18  1945 02/09/18  0204 02/09/18  0829   *  < > 161* 163*  159*   K 3.6  < > 3.0* 3.6 3.9   *  < > 126* 126* 125*   CO2 28  < > 27 27 25   *  < > 173* 169* 153*   BUN 39*  < > 30* 30* 28*   CREATININE 2.0*  < > 1.3 1.4 1.3   CALCIUM 9.1  < > 8.0* 8.4* 8.5*   PROT 7.7  --   --  7.1  --    ALBUMIN 2.4*  --   --  2.2*  --    BILITOT 0.4  --   --  0.2  --    ALKPHOS 99  --   --  92  --    AST 23  --   --  18  --    ALT 12  --   --  11  --    ANIONGAP 11  < > 8 10 9   EGFRNONAA 21.7*  < > 36.5* 33.3* 36.5*   < > = values in this interval not displayed.    Significant Imaging:    Imaging Results          X-Ray Chest 1 View (Final result)  Result time 02/09/18 12:58:39    Final result by Poli Varner MD (02/09/18 12:58:39)                 Impression:     As above.      Electronically signed by: Poli Varner MD  Date:     02/09/18  Time:    12:58              Narrative:    Comparison is made to 2/8/18.    Complete opacification of the left hemithorax with mediastinal shift to the ipsilateral left side and deformity of the osseous thoracic wall on the left are all again observed, consistent with a prior pneumonectomy/thoracoplasty procedure.  Right lung remains clear, and free of significant airspace consolidation or volume loss.  No pleural fluid on the right.  No pneumothorax.  No significant detrimental interval change in the appearance of the chest since 2/8/18 is appreciated.                             CT Head Without Contrast (Final result)  Result time 02/08/18 10:58:57    Final result by Michell Saez MD (02/08/18 10:58:57)                 Impression:         No evidence of acute hemorrhage or major vascular distribution infarct.    Generalized cerebral volume loss with chronic microvascular ischemic changes and a remote right cerebellar infarct.    Chronic inflammatory changes of the partially visualized paranasal sinuses.  ______________________________________     Electronically signed by resident: MICHELL HOLLINGSWORTH MD  Date:      02/08/18  Time:    09:39            As the supervising and teaching physician, I personally reviewed the images and resident's interpretation and I agree with the findings.          Electronically signed by: MICHELL NAVARRETE MD  Date:     02/08/18  Time:    10:58              Narrative:    CT head without contrast    02/08/18 08:45:24    Accession# [id number]    CLINICAL INDICATION: 89 year old F with Confusion/delirium, altered LOC, unexplained       TECHNIQUE: Axial CT images obtained throughout the region of the head without the use of intravenous contrast. Axial, sagittal and coronal reconstructions were performed.    COMPARISON: CT head 11/25/04    FINDINGS:    Moderate degree of generalized volume loss. Configuration does not suggest hydrocephalus.    No evidence of acute hemorrhage. No acute major vascular distribution infarct. Remote right cerebellar infarct. Mild/moderate chronic microvascular ischemic change. No parenchymal mass or mass effect.    No extra-axial blood or fluid collection.    The osseous calvarium is intact. Hyperostosis frontalis.    Essentially complete opacification of the partially visualized left maxillary sinus with surrounding osseous thickening and sclerosis of chronic sinus disease. Patchy mucosal thickening in the ethmoid air cells. Periosteal thickening in the right sphenoid sinus also consistent with chronic inflammation. The bilateral mastoid air cells are clear.                             X-Ray Chest AP Portable (Final result)  Result time 02/08/18 09:03:57    Final result by Poli Varner MD (02/08/18 09:03:57)                 Impression:     No significant abnormality directly referable to the current history of chest pain is observed.  No significant detrimental interval change in the appearance of the chest since 9/17/17 is appreciated.      Electronically signed by: Poli Varner MD  Date:     02/08/18  Time:    09:03              Narrative:    Comparison is made to  "9/17/17.    Complete opacification of the left hemithorax and deformity of the thoracic wall on this side is again observed, stable in appearance since the prior exam and consistent with prior pneumonectomy/thoracoplasty.  Mediastinal shift to the left is again seen, and the left cardiac border is completely obscured by the process in the left hemithorax.  Pulmonary vascularity in the right lung is normal, and the right lung is clear and free of significant airspace consolidation or volume loss.  No pleural fluid on the right.  No pneumothorax.  Calcification in the wall of the aortic arch is again observed, as is scoliosis convex to the left in the upper thoracic region.                                Assessment/Plan:      * Severe sepsis with acute organ dysfunction    2/4 SIRs (WBC 15, BP 90/50) with bacteremia and UTI on UA. CXR neg for PNA. Also risk of infection via decubitus wound. Initial fluid rescucitation 30cc/kg-total 1.5L Ns. IV ceftriaxone 2g in Ed. Initial blood culture with E. Coli & urine cultures with GNR, lactose . WBC & lactate trending down, remains afebrile, HD stable.    -con't ceftriaxone 1g Q24H  -follow up susceptibility of urine & blood cultures  -repeat blood cultures obtained.        Lethargy    89 yr old female presents to ED with increased lethargy and altered mental status in setting of sepsis with UTI, hypotension, hypernatremia, CHERI, and elevated troponin. Leading causes include infection with UTI although bacteremia and infected right buttock wound also concerning. Electrolyte abnormalities secondary to dehydration also likely contributing (hypernatremic 164.) Stroke possible (subdural in elderly vs ischemic secondary to hypotension; patient was weak in legs but likely secondary to fatigue; CThead neg for acute hemorrhage or major vascular distribution.)    -see "sepsis" for details regarding plan of care  -see "dehydration" for details regarding plan of care  -speech " evaluated patient, recommend dental soft, honey thick with aspiration precautions        UTI (urinary tract infection)    -UA with 3+ leuks, 40 WBC, many bacteria  -urine culture with GNR, lactose   -con't IV rocephin 1g Q24 hr          Dehydration    Likely secondary to decreased PO intake. Electrolyte abnormalities (Mg elevated at 3.0, hypernatremic 164) and hypotensive (SBP 90/50 on admit)    -1.5L NS in setting of sepsis  -Free water deficit 3.5L-started con't D5 in setting of hypernatremia  -monitor BP-responding well to fluid  -monitor electrolyte with BMP Q6H          Hypernatremia    Na 164  on admit, likely secondary to dehydration in setting of decreased PO intake.Total water deficit 3.5LOverall, Na downtrending from admit but with some fluctuations on repeat BMPs. Still remains extremely elevated (159) today, will increase rate D5 with careful monitoring not to exceed 10mmol/day.    -D5 rate 50 --> 75 mL/hr  -trend BMP Q6H          CHERI (acute kidney injury)    -Cr 2.0 on admit baseline 0.8. Likely pre-renal secondary to decreased fluid intake and sepsis. Improving with IVF.    -con't optimizing IVF  -trend with BMP  -urine studies if no improvement with IVF  -strict I/O  -avoid nephrotoxic agents          Elevated troponin    0.104 on admit, baseline normal. Also with ST depression on inferior leads of EKG. Likely secondary to demand ischemia in setting of hypovolemia. Also concern for MI in setting of changes on EKG. RFs for MI include HTN, HLD, and age; patient has not history of MI or cardiac intervention. Does not complain of chest pain although currently not communicating. HEART score 7, DANIEL score: 4    Overall decided not to treat for NSTEMI as trops platue'd and repeat EKG without ST change.            Lactic acidosis    Likely secondary to sepsis. Also possible ischemia in setting of hypovolemia. Trended and repeat levels resolving with IVF & antibiotics.              Decubitus ulcer of  buttock, unstageable    -wound care consulted, appreciate recs          Decubitus ulcer of right buttock, stage 2    Pressure ulcer right buttock. Appears worse than 4 weeks ago per family. No pus, blood, or mal-odor noted.    -ordered wound care, appreciate recs.        History of diabetes mellitus    On metformin 500mg BD at home. Last HbA1c 5.1% in 09/2017.    -hold metformin  -BG well-controlled  -LDSC for now  -POCT accuchecks and FB with CMP          Essential hypertension    -on nifedipine 60mg Q24H at home    -hypotensive on admit  -hold nifedipine in setting of sepsis        Anorexia    Likely contributing to dehydration    -evaluated by speech- dental soft, honey thinck  -adult regular to limit restrictions in patient with baseline decreased appetite        Cough    Cough noted overnight per daughter. SaO2 >92% overnight. Some crackles on exam. Considered pulmonary edema secondary to IVF. Obtained CXR-neg for acute change.     Will continue to monitor.            VTE Risk Mitigation         Ordered     heparin (porcine) injection 5,000 Units  Every 8 hours     Route:  Subcutaneous        02/08/18 2247     Medium Risk of VTE  Once      02/08/18 1303     Place IGNACIA hose  Until discontinued      02/08/18 1107     Place sequential compression device  Until discontinued      02/08/18 1107          Diet- Dental soft, honey thick  DVT ppx- heparin 5000u SQ Q8H in setting of renal injury  Dispo- pending medical stability      Ruby Stratton MD  Department of Hospital Medicine   [facility]

## 2018-02-09 NOTE — SUBJECTIVE & OBJECTIVE
Interval History: Ms. Pelletier improved from yesterday; more arousable and responsive to voice. Daughter at bedside reports cough throughout night.     Review of Systems   Unable to perform ROS: Dementia     Objective:     Vital Signs (Most Recent):  Temp: 98.3 °F (36.8 °C) (02/09/18 1156)  Pulse: 95 (02/09/18 1156)  Resp: 16 (02/09/18 1156)  BP: 126/65 (02/09/18 1156)  SpO2: 96 % (02/09/18 1156) Vital Signs (24h Range):  Temp:  [97.7 °F (36.5 °C)-98.3 °F (36.8 °C)] 98.3 °F (36.8 °C)  Pulse:  [] 95  Resp:  [14-22] 16  SpO2:  [92 %-100 %] 96 %  BP: (126-163)/(61-72) 126/65     Weight: 40.8 kg (90 lb)  Body mass index is 17.58 kg/m².  No intake or output data in the 24 hours ending 02/09/18 1258   Physical Exam   Constitutional: No distress.   cachectic   HENT:   Head: Normocephalic and atraumatic.   Eyes: Pupils are equal, round, and reactive to light.   Neck: Normal range of motion.   Cardiovascular: Normal rate, regular rhythm and normal heart sounds.    Pulmonary/Chest: Effort normal. She has rales.   Abdominal: Soft. There is no tenderness.   Hypoactive bowel sounds   Neurological:   -Opens eyes to voice; follows commands; minimally verbally responsive; less drowsy   Skin: Skin is dry. No erythema.   -Hands cool; bilateral lower extremities warm       Significant Labs:   CBC:   Recent Labs  Lab 02/08/18  0825 02/08/18 0827 02/09/18  0204   WBC  --  15.41* 14.71*   HGB  --  11.1* 9.7*   HCT 36 39.1 34.0*   PLT  --  333 254     CMP:   Recent Labs  Lab 02/08/18  0827  02/08/18  1945 02/09/18  0204 02/09/18  0829   *  < > 161* 163* 159*   K 3.6  < > 3.0* 3.6 3.9   *  < > 126* 126* 125*   CO2 28  < > 27 27 25   *  < > 173* 169* 153*   BUN 39*  < > 30* 30* 28*   CREATININE 2.0*  < > 1.3 1.4 1.3   CALCIUM 9.1  < > 8.0* 8.4* 8.5*   PROT 7.7  --   --  7.1  --    ALBUMIN 2.4*  --   --  2.2*  --    BILITOT 0.4  --   --  0.2  --    ALKPHOS 99  --   --  92  --    AST 23  --   --  18  --    ALT 12  --    --  11  --    ANIONGAP 11  < > 8 10 9   EGFRNONAA 21.7*  < > 36.5* 33.3* 36.5*   < > = values in this interval not displayed.    Significant Imaging:    Imaging Results          X-Ray Chest 1 View (Final result)  Result time 02/09/18 12:58:39    Final result by Poli Varner MD (02/09/18 12:58:39)                 Impression:     As above.      Electronically signed by: Poli Varner MD  Date:     02/09/18  Time:    12:58              Narrative:    Comparison is made to 2/8/18.    Complete opacification of the left hemithorax with mediastinal shift to the ipsilateral left side and deformity of the osseous thoracic wall on the left are all again observed, consistent with a prior pneumonectomy/thoracoplasty procedure.  Right lung remains clear, and free of significant airspace consolidation or volume loss.  No pleural fluid on the right.  No pneumothorax.  No significant detrimental interval change in the appearance of the chest since 2/8/18 is appreciated.                             CT Head Without Contrast (Final result)  Result time 02/08/18 10:58:57    Final result by Michell Navarrete MD (02/08/18 10:58:57)                 Impression:         No evidence of acute hemorrhage or major vascular distribution infarct.    Generalized cerebral volume loss with chronic microvascular ischemic changes and a remote right cerebellar infarct.    Chronic inflammatory changes of the partially visualized paranasal sinuses.  ______________________________________     Electronically signed by resident: MICHELL HOLLINGSWORTH MD  Date:     02/08/18  Time:    09:39            As the supervising and teaching physician, I personally reviewed the images and resident's interpretation and I agree with the findings.          Electronically signed by: MICHELL NAVARRETE MD  Date:     02/08/18  Time:    10:58              Narrative:    CT head without contrast    02/08/18 08:45:24    Accession# [id number]    CLINICAL INDICATION: 89 year old F with  Confusion/delirium, altered LOC, unexplained       TECHNIQUE: Axial CT images obtained throughout the region of the head without the use of intravenous contrast. Axial, sagittal and coronal reconstructions were performed.    COMPARISON: CT head 11/25/04    FINDINGS:    Moderate degree of generalized volume loss. Configuration does not suggest hydrocephalus.    No evidence of acute hemorrhage. No acute major vascular distribution infarct. Remote right cerebellar infarct. Mild/moderate chronic microvascular ischemic change. No parenchymal mass or mass effect.    No extra-axial blood or fluid collection.    The osseous calvarium is intact. Hyperostosis frontalis.    Essentially complete opacification of the partially visualized left maxillary sinus with surrounding osseous thickening and sclerosis of chronic sinus disease. Patchy mucosal thickening in the ethmoid air cells. Periosteal thickening in the right sphenoid sinus also consistent with chronic inflammation. The bilateral mastoid air cells are clear.                             X-Ray Chest AP Portable (Final result)  Result time 02/08/18 09:03:57    Final result by Poli Varner MD (02/08/18 09:03:57)                 Impression:     No significant abnormality directly referable to the current history of chest pain is observed.  No significant detrimental interval change in the appearance of the chest since 9/17/17 is appreciated.      Electronically signed by: Poli Varner MD  Date:     02/08/18  Time:    09:03              Narrative:    Comparison is made to 9/17/17.    Complete opacification of the left hemithorax and deformity of the thoracic wall on this side is again observed, stable in appearance since the prior exam and consistent with prior pneumonectomy/thoracoplasty.  Mediastinal shift to the left is again seen, and the left cardiac border is completely obscured by the process in the left hemithorax.  Pulmonary vascularity in the right lung is normal, and  the right lung is clear and free of significant airspace consolidation or volume loss.  No pleural fluid on the right.  No pneumothorax.  Calcification in the wall of the aortic arch is again observed, as is scoliosis convex to the left in the upper thoracic region.

## 2018-02-09 NOTE — ASSESSMENT & PLAN NOTE
0.104 on admit, baseline normal. Also with ST depression on inferior leads of EKG. Likely secondary to demand ischemia in setting of hypovolemia. Also concern for MI in setting of changes on EKG. RFs for MI include HTN, HLD, and age; patient has not history of MI or cardiac intervention. Does not complain of chest pain although currently not communicating. HEART score 7, DANIEL score: 4    Overall decided not to treat for NSTEMI as trops platue'd and repeat EKG without ST change.

## 2018-02-09 NOTE — ASSESSMENT & PLAN NOTE
Likely secondary to sepsis. Also possible ischemia in setting of hypovolemia. Trended and repeat levels resolving with IVF & antibiotics.

## 2018-02-09 NOTE — PT/OT/SLP EVAL
Speech Language Pathology Evaluation  Bedside Swallow    Patient Name:  Delfina Pelletier   MRN:  [id number]  Admitting Diagnosis: Severe sepsis with acute organ dysfunction    Recommendations:                 General Recommendations:  Dysphagia therapy  Diet recommendations:  Dental Soft, Honey Thick   Aspiration Precautions: 1 bite/sip at a time, Feed only when awake/alert, HOB to 90 degrees, Meds crushed in puree, No straws and Small bites/sips   General Precautions: Standard, aspiration  Communication strategies:  provide increased time to answer    History:     Past Medical History:   Diagnosis Date    Diabetes mellitus     Diabetes mellitus type II     Diabetic neuropathy     Hyperlipidemia     Hypertension     Tuberculosis        Past Surgical History:   Procedure Laterality Date    left lung lobectomy for TB      removed at 20yrs old       Social History: Patient lives with niece     Chest X-Rays: 2/8/18 No significant abnormality directly referable to the current history of chest pain is observed.  No significant detrimental interval change in the appearance of the chest since 9/17/17 is appreciated.    Prior diet: Regular/thin per daughter at bedside.  Subjective     Awake/alert    Objective:     Oral Musculature Evaluation  Oral Musculature: unable to assess due to poor participation/comprehension  Volitional Cough: weak  Volitional Swallow: not elicited  Voice Prior to PO Intake: clear    Bedside Swallow Eval:   Consistencies Assessed:  · Thin liquids x5 cup  · Nectar thick liquids x3 cup  · Honey thick liquids x2 oz cup  · Puree x3  · Solids x1     Oral Phase:   · Slow oral transit time    Pharyngeal Phase:   · decreased hyolaryngeal excursion to palpation  · delayed swallow initation  · no overt clinical signs/symptoms of aspiration  · multiple spontaneous swallows    Compensatory Strategies  · Multiple swallows    Assessment:     Delfina Pelletier is a 89 y.o. female with an SLP diagnosis of Dysphagia.      Goals:    SLP Goals     Not on file                Plan:     · Patient to be seen:  3 x/week   · Plan of Care expires:  03/10/18  · Plan of Care reviewed with:  patient, daughter   · SLP Follow-Up:  Yes          Time Tracking:     SLP Treatment Date:   02/09/18  Speech Start Time:  0925  Speech Stop Time:  0935     Speech Total Time (min):  10 min    Billable Minutes: Eval Swallow and Oral Function 10    Chelsea Stevenson CCC-SLP   Speech Language Pathologist  Pager [phone]  02/09/2018

## 2018-02-09 NOTE — ASSESSMENT & PLAN NOTE
Na 164  on admit, likely secondary to dehydration in setting of decreased PO intake.Total water deficit 3.5LOverall, Na downtrending from admit but with some fluctuations on repeat BMPs. Still remains extremely elevated (159) today, will increase rate D5 with careful monitoring not to exceed 10mmol/day.    -D5 rate 50 --> 75 mL/hr  -trend BMP Q6H

## 2018-02-09 NOTE — ASSESSMENT & PLAN NOTE
"89 yr old female presents to ED with increased lethargy and altered mental status in setting of sepsis with UTI, hypotension, hypernatremia, CHERI, and elevated troponin. Leading causes include infection with UTI although bacteremia and infected right buttock wound also concerning. Electrolyte abnormalities secondary to dehydration also likely contributing (hypernatremic 164.) Stroke possible (subdural in elderly vs ischemic secondary to hypotension; patient was weak in legs but likely secondary to fatigue; CThead neg for acute hemorrhage or major vascular distribution.)    -see "sepsis" for details regarding plan of care  -see "dehydration" for details regarding plan of care  -speech evaluated patient, recommend dental soft, honey thick with aspiration precautions  "

## 2018-02-09 NOTE — PHARMACY MED REC
"Admission Medication Reconciliation - Pharmacy Consult Note    The home medication history was taken by Priscila Shafer, Pharmacy Tech.  Based on information gathered and subsequent review by the clinical pharmacist, the items below may need attention.     You may go to "Admission" then "Reconcile Home Medications" tabs to review and/or act upon these items. Based on information gathered and subsequent review by the clinical pharmacist, the items below may need attention.    Potentially problematic discrepancies with current MAR  o Patient IS NOT taking the following which was ordered upon admit  o Docusate  o Miralax      Please address this information as you see fit.  Feel free to contact us if you have any questions or require assistance.    Sigrid Leavitt, PharmD, [facility]  Internal Medicine Clinical Pharmacy Specialist  Spectra link: [phone]        Patient's prior to admission medication regimen was as follows:  Medication Sig    donepezil (ARICEPT) 10 MG tablet Take 1 tablet (10 mg total) by mouth once daily.    NIFEdipine (NIFEDICAL XL) 60 MG (OSM) 24 hr tablet Take 1 tablet (60 mg total) by mouth once daily.    rosuvastatin (CRESTOR) 20 MG tablet Take 1 tablet (20 mg total) by mouth once daily.         .    .          "

## 2018-02-09 NOTE — ASSESSMENT & PLAN NOTE
Likely secondary to decreased PO intake. Electrolyte abnormalities (Mg elevated at 3.0, hypernatremic 164) and hypotensive (SBP 90/50 on admit)    -1.5L NS in setting of sepsis  -Free water deficit 3.5L-started con't D5 in setting of hypernatremia  -monitor BP-responding well to fluid  -monitor electrolyte with BMP Q6H

## 2018-02-09 NOTE — NURSING
JOAN Man contacted per laboratory in regards to patient having positive blood cultures, gram negative rods. Mt4 paged and notified.

## 2018-02-09 NOTE — ASSESSMENT & PLAN NOTE
Likely contributing to dehydration    -evaluated by speech- dental soft, honey thinck  -adult regular to limit restrictions in patient with baseline decreased appetite

## 2018-02-09 NOTE — PROGRESS NOTES
Wound care consult received for pressure injuries.  Pt with sister at bedside.  Upon assessment, noted pt with sacrum end of life skin changes with gray colored skin covering majority of sacrum.  Right heel and left ankle with chronic DTI.      Spoke with Dr. Veras's team regarding assessment and recommendations:  1. Pressure injury prevention interventions to include SUSHANT, repostioning, heel protectors. NO diapers.  2. Triad ointment BID to sacrum  3. Aquacel foam to bilateral heels/ankle  4. Dietary consult for nutrition needs     02/09/18 1152       Wound 02/09/18 End of life skin changes Sacral Spine   Date First Assessed: 02/09/18   Pre-existing: Yes  Wound Type: End of life skin changes  Location: Sacral Spine   Wound Image    Wound WDL ex   Dressing Appearance No dressing   Drainage Amount Scant   Drainage Characteristics/Odor Serosanguineous   Appearance Red;Maroon;Purple;Black;Gray;Pink   Periwound Area Intact   Wound Length (cm) 5   Wound Width (cm) 10   Depth (cm) 0.1   Care Soap and water;Cleansed with:;Applied:;Skin Barrier;Other (see comments)  (ordered Triad)   Dressing Change Due 02/09/18       Pressure Injury 02/09/18 Right medial Heel Deep tissue injury   Date First Assessed: 02/09/18   Pressure Injury Present on Admission: yes  Side: Right  Orientation: medial  Location: Heel  Staging: Deep tissue injury   Wound Image    Staging D   Dressing Appearance No dressing   Drainage Amount None   Drainage Characteristics/Odor No odor   Appearance Purple;Black;Gray;Maroon   Periwound Area Intact   Wound Length (cm) 1.8   Wound Width (cm) 2.8   Depth (cm) 0   Care Cleansed with:;Sterile normal saline   Dressing Foam;Hydrofiber  (Dressings ordered)   Dressing Change Due 02/09/18       Pressure Injury 02/09/18 Left lateral Malleolus Deep tissue injury   Date First Assessed: 02/09/18   Pressure Injury Present on Admission: yes  Side: Left  Orientation: lateral  Location: Malleolus  Staging: Deep tissue  injury   Wound Image    Staging D   Dressing Appearance No dressing   Drainage Amount None   Drainage Characteristics/Odor No odor   Appearance Red;Maroon;Purple;Black;Gray   Periwound Area Intact   Wound Length (cm) 1.5   Wound Width (cm) 1.5   Depth (cm) 0   Care Cleansed with:;Sterile normal saline   Dressing Hydrofiber;Foam  (ordered)   Dressing Change Due 02/09/18

## 2018-02-09 NOTE — ASSESSMENT & PLAN NOTE
-UA with 3+ leuks, 40 WBC, many bacteria  -urine culture with GNR, lactose   -con't IV rocephin 1g Q24 hr

## 2018-02-10 PROBLEM — B96.20 BACTEREMIA DUE TO ESCHERICHIA COLI: Status: ACTIVE | Noted: 2018-02-09

## 2018-02-10 PROBLEM — R79.89 ELEVATED TROPONIN: Status: RESOLVED | Noted: 2018-02-08 | Resolved: 2018-02-10

## 2018-02-10 PROBLEM — E87.20 LACTIC ACIDOSIS: Status: RESOLVED | Noted: 2018-02-08 | Resolved: 2018-02-10

## 2018-02-10 LAB
ALBUMIN SERPL BCP-MCNC: 2.1 G/DL
ALP SERPL-CCNC: 96 U/L
ALT SERPL W/O P-5'-P-CCNC: 12 U/L
ANION GAP SERPL CALC-SCNC: 10 MMOL/L
ANION GAP SERPL CALC-SCNC: 7 MMOL/L
ANION GAP SERPL CALC-SCNC: 8 MMOL/L
AST SERPL-CCNC: 22 U/L
BACTERIA UR CULT: NORMAL
BASOPHILS # BLD AUTO: 0.02 K/UL
BASOPHILS NFR BLD: 0.1 %
BILIRUB SERPL-MCNC: 0.2 MG/DL
BUN SERPL-MCNC: 32 MG/DL
CALCIUM SERPL-MCNC: 8.1 MG/DL
CALCIUM SERPL-MCNC: 8.3 MG/DL
CALCIUM SERPL-MCNC: 8.4 MG/DL
CHLORIDE SERPL-SCNC: 118 MMOL/L
CHLORIDE SERPL-SCNC: 121 MMOL/L
CHLORIDE SERPL-SCNC: 121 MMOL/L
CO2 SERPL-SCNC: 21 MMOL/L
CO2 SERPL-SCNC: 23 MMOL/L
CO2 SERPL-SCNC: 23 MMOL/L
CREAT SERPL-MCNC: 1.8 MG/DL
CREAT UR-MCNC: 69 MG/DL
CREAT UR-MCNC: 69 MG/DL
DIFFERENTIAL METHOD: ABNORMAL
EOSINOPHIL # BLD AUTO: 0 K/UL
EOSINOPHIL NFR BLD: 0.2 %
ERYTHROCYTE [DISTWIDTH] IN BLOOD BY AUTOMATED COUNT: 13.9 %
EST. GFR  (AFRICAN AMERICAN): 28.4 ML/MIN/1.73 M^2
EST. GFR  (NON AFRICAN AMERICAN): 24.6 ML/MIN/1.73 M^2
GLUCOSE SERPL-MCNC: 184 MG/DL
GLUCOSE SERPL-MCNC: 245 MG/DL
GLUCOSE SERPL-MCNC: 278 MG/DL
HCT VFR BLD AUTO: 30.1 %
HGB BLD-MCNC: 8.6 G/DL
IMM GRANULOCYTES # BLD AUTO: 0.16 K/UL
IMM GRANULOCYTES NFR BLD AUTO: 1 %
LACTATE SERPL-SCNC: 2.4 MMOL/L
LACTATE SERPL-SCNC: 2.6 MMOL/L
LYMPHOCYTES # BLD AUTO: 2 K/UL
LYMPHOCYTES NFR BLD: 12.5 %
MCH RBC QN AUTO: 27.1 PG
MCHC RBC AUTO-ENTMCNC: 28.6 G/DL
MCV RBC AUTO: 95 FL
MONOCYTES # BLD AUTO: 1 K/UL
MONOCYTES NFR BLD: 6 %
NEUTROPHILS # BLD AUTO: 13 K/UL
NEUTROPHILS NFR BLD: 80.2 %
NRBC BLD-RTO: 0 /100 WBC
PLATELET # BLD AUTO: 195 K/UL
PMV BLD AUTO: 12.7 FL
POCT GLUCOSE: 186 MG/DL (ref 70–110)
POCT GLUCOSE: 206 MG/DL (ref 70–110)
POCT GLUCOSE: 295 MG/DL (ref 70–110)
POCT GLUCOSE: 376 MG/DL (ref 70–110)
POTASSIUM SERPL-SCNC: 3.5 MMOL/L
POTASSIUM SERPL-SCNC: 3.7 MMOL/L
POTASSIUM SERPL-SCNC: 4 MMOL/L
PROT SERPL-MCNC: 6.8 G/DL
PROT UR-MCNC: 105 MG/DL
PROT UR-MCNC: 105 MG/DL
PROT/CREAT RATIO, UR: 1.52
RBC # BLD AUTO: 3.17 M/UL
SODIUM SERPL-SCNC: 149 MMOL/L
SODIUM SERPL-SCNC: 151 MMOL/L
SODIUM SERPL-SCNC: 152 MMOL/L
SODIUM UR-SCNC: 23 MMOL/L
WBC # BLD AUTO: 16.18 K/UL

## 2018-02-10 PROCEDURE — 84156 ASSAY OF PROTEIN URINE: CPT

## 2018-02-10 PROCEDURE — 63600175 PHARM REV CODE 636 W HCPCS: Performed by: STUDENT IN AN ORGANIZED HEALTH CARE EDUCATION/TRAINING PROGRAM

## 2018-02-10 PROCEDURE — 25000003 PHARM REV CODE 250: Performed by: STUDENT IN AN ORGANIZED HEALTH CARE EDUCATION/TRAINING PROGRAM

## 2018-02-10 PROCEDURE — 80053 COMPREHEN METABOLIC PANEL: CPT

## 2018-02-10 PROCEDURE — 97802 MEDICAL NUTRITION INDIV IN: CPT

## 2018-02-10 PROCEDURE — S5010 5% DEXTROSE AND 0.45% SALINE: HCPCS | Performed by: STUDENT IN AN ORGANIZED HEALTH CARE EDUCATION/TRAINING PROGRAM

## 2018-02-10 PROCEDURE — 99233 SBSQ HOSP IP/OBS HIGH 50: CPT | Mod: GC,,, | Performed by: HOSPITALIST

## 2018-02-10 PROCEDURE — 36415 COLL VENOUS BLD VENIPUNCTURE: CPT

## 2018-02-10 PROCEDURE — 84300 ASSAY OF URINE SODIUM: CPT

## 2018-02-10 PROCEDURE — 11000001 HC ACUTE MED/SURG PRIVATE ROOM

## 2018-02-10 PROCEDURE — 85025 COMPLETE CBC W/AUTO DIFF WBC: CPT

## 2018-02-10 PROCEDURE — 83605 ASSAY OF LACTIC ACID: CPT

## 2018-02-10 PROCEDURE — 83605 ASSAY OF LACTIC ACID: CPT | Mod: 91

## 2018-02-10 PROCEDURE — 80048 BASIC METABOLIC PNL TOTAL CA: CPT

## 2018-02-10 PROCEDURE — 87040 BLOOD CULTURE FOR BACTERIA: CPT

## 2018-02-10 RX ORDER — INSULIN ASPART 100 [IU]/ML
1-10 INJECTION, SOLUTION INTRAVENOUS; SUBCUTANEOUS EVERY 6 HOURS PRN
Status: DISCONTINUED | OUTPATIENT
Start: 2018-02-10 | End: 2018-02-12

## 2018-02-10 RX ORDER — ACETAMINOPHEN 10 MG/ML
650 INJECTION, SOLUTION INTRAVENOUS ONCE
Status: COMPLETED | OUTPATIENT
Start: 2018-02-10 | End: 2018-02-10

## 2018-02-10 RX ORDER — SODIUM CHLORIDE 450 MG/100ML
INJECTION, SOLUTION INTRAVENOUS CONTINUOUS
Status: DISCONTINUED | OUTPATIENT
Start: 2018-02-10 | End: 2018-02-10

## 2018-02-10 RX ORDER — DEXTROSE MONOHYDRATE AND SODIUM CHLORIDE 5; .45 G/100ML; G/100ML
INJECTION, SOLUTION INTRAVENOUS CONTINUOUS
Status: DISCONTINUED | OUTPATIENT
Start: 2018-02-10 | End: 2018-02-11

## 2018-02-10 RX ORDER — ACETAMINOPHEN 325 MG/1
650 TABLET ORAL EVERY 8 HOURS PRN
Status: DISCONTINUED | OUTPATIENT
Start: 2018-02-10 | End: 2018-02-14 | Stop reason: HOSPADM

## 2018-02-10 RX ADMIN — INSULIN ASPART 6 UNITS: 100 INJECTION, SOLUTION INTRAVENOUS; SUBCUTANEOUS at 06:02

## 2018-02-10 RX ADMIN — INSULIN DETEMIR 5 UNITS: 100 INJECTION, SOLUTION SUBCUTANEOUS at 11:02

## 2018-02-10 RX ADMIN — HEPARIN SODIUM 5000 UNITS: 5000 INJECTION, SOLUTION INTRAVENOUS; SUBCUTANEOUS at 02:02

## 2018-02-10 RX ADMIN — ACETAMINOPHEN 650 MG: 10 INJECTION, SOLUTION INTRAVENOUS at 04:02

## 2018-02-10 RX ADMIN — CEFTRIAXONE SODIUM 1 G: 1 INJECTION, POWDER, FOR SOLUTION INTRAMUSCULAR; INTRAVENOUS at 11:02

## 2018-02-10 RX ADMIN — DEXTROSE AND SODIUM CHLORIDE: 5; .45 INJECTION, SOLUTION INTRAVENOUS at 08:02

## 2018-02-10 RX ADMIN — INSULIN ASPART 2 UNITS: 100 INJECTION, SOLUTION INTRAVENOUS; SUBCUTANEOUS at 11:02

## 2018-02-10 RX ADMIN — SODIUM CHLORIDE: 0.45 INJECTION, SOLUTION INTRAVENOUS at 04:02

## 2018-02-10 RX ADMIN — HEPARIN SODIUM 5000 UNITS: 5000 INJECTION, SOLUTION INTRAVENOUS; SUBCUTANEOUS at 09:02

## 2018-02-10 RX ADMIN — SODIUM CHLORIDE 1000 ML: 0.9 INJECTION, SOLUTION INTRAVENOUS at 01:02

## 2018-02-10 NOTE — PROGRESS NOTES
Spoke with IM Adeline Herrera And notified her that pt is unable to chew or swallow and she keep the food and pills in her mouth . MD order to keep pt NPO for now and hold all her oral medication for this morning . Will follow orders and keep monitoring .

## 2018-02-10 NOTE — PLAN OF CARE
Problem: Patient Care Overview  Goal: Plan of Care Review  Outcome: Ongoing (interventions implemented as appropriate)  Nutrition assessment complete. Please see RD note below.

## 2018-02-10 NOTE — ASSESSMENT & PLAN NOTE
-UA with 3+ leuks, 40 WBC, many bacteria  -urine culture with E. Coli  -con't IV rocephin 1g Q24 hr

## 2018-02-10 NOTE — ASSESSMENT & PLAN NOTE
-Cr 2.0 on admit baseline 0.8. Likely pre-renal secondary to decreased fluid intake and sepsis. Initially improving with IVF. Now worsening on repeat BMPs. Ordered urine studies but likely still remains severely dehydrated.    -more aggressive with IVF  -f/u urine studies  -strict I/O  -avoid nephrotoxic agents

## 2018-02-10 NOTE — PROGRESS NOTES
[facility]  Adult Nutrition  Consult Note    SUMMARY     Recommendations    Recommendation/Intervention:   1. Monitor PO intake.   2. Monitor boost glucose intake.   3. RD to follow.   Goals: >85% of EEN,EPN  Nutrition Goal Status: new    Reason for Assessment    Reason for Assessment: nurse/nurse practitioner consult  Diagnosis:  (severe sepsis)  Relevent Medical History: HTN, DM, HLD, dementia   Interdisciplinary Rounds: did not attend     General Information Comments: Pt family member present in room. Family member states pocketing of food and 0% PO intake since admit. Pt has had a decrease in appetite for a few days prior to admit. Pt family member states weight loss, but unsure of how much pt has lost.     Nutrition Discharge Planning: DC on regular diet, texture per SLP.     Nutrition Prescription Ordered    Current Diet Order: Dental soft, honey thich liquids     Oral Nutrition Supplement: Boost glucose TID     Evaluation of Received Nutrients/Fluid Intake    Energy Calories Required: not meeting needs  Protein Required: not meeting needs  Fluid Required: not meeting needs  Comments: LBM 2/8/18  % Intake of Estimated Energy Needs: 0 - 25 %  % Meal Intake: 0%     Nutrition Risk Screen     Nutrition Risk Screen: dysphagia or difficulty swallowing    Nutrition/Diet History    Patient Reported Diet/Restrictions/Preferences: general     Food Preferences: No cultural or Druze prefences     Labs/Tests/Procedures/Meds    Pertinent Labs Reviewed: reviewed  Pertinent Labs Comments: Na 151, chloride 121, BUN 32, crt 1.8, glucose 245, Ca 8.1  Pertinent Medications Reviewed: reviewed  Pertinent Medications Comments: heparin, insulin, statin    Physical Findings    Overall Physical Appearance: weak, underweight     Skin:  (end of life change to sacral spine, DTI right medical heel, DTI left lateral malleolus)    Anthropometrics    Height: 5' (152.4 cm)  Weight Method: Bed Scale  Weight: 40.8 kg  (89 lb 15.2 oz)     Ideal Body Weight (IBW), Female: 100 lb  % Ideal Body Weight, Female (lb): 89.95 lb  BMI (Calculated): 17.6  BMI Grade: 17 - 18.4 protein-energy malnutrition grade I     Estimated/Assessed Needs    Weight Used For Calorie Calculations: 45.5 kg (100 lb 5 oz)      Energy Calorie Requirements (kcal): 1365kcals  Energy Need Method: Kcal/kg (30kcal/kg)  RMR ([place]-St. Jeor Equation): 801.5     Weight Used For Protein Calculations: 45.5 kg (100 lb 5 oz)  Protein Requirements: 54-63gm/day (1.2-1.4gm/kg)    Fluid Need Method: RDA Method (or per MD)  RDA Method (mL): 1365     CHO Requirement: 200 gm CHO     Assessment and Plan    Nutrition Problem  Malnutrition      Related to (etiology):   Inadequate energy intake    Signs and Symptoms (as evidenced by):   BMI 17.6, multiple wounds, and 0% PO intake    Interventions/Recommendations (treatment strategy):  See recs    Nutrition Diagnosis Status:   New    Monitor and Evaluation    Food and Nutrient Intake: food and beverage intake  Food and Nutrient Adminstration: diet order  Physical Activity and Function: nutrition-related ADLs and IADLs  Anthropometric Measurements: weight, weight change  Biochemical Data, Medical Tests and Procedures: electrolyte and renal panel, gastrointestinal profile, glucose/endocrine profile, inflammatory profile, lipid profile  Nutrition-Focused Physical Findings: overall appearance    Nutrition Risk    Level of Risk:  (2x/weekly)    Nutrition Follow-Up    RD Follow-up?: Yes

## 2018-02-10 NOTE — NURSING
Patient was unable to receive PO Acetaminophen due to her pocketing the medication as she did with supper per daughter's report. IV Acetaminophen currently infusing, IVFs changed to .45 NA CL at 75cc/hr. Patient has been incontinent of bowel and bladder during the shift. Will continue to monitor.

## 2018-02-10 NOTE — NURSING
During 0400 Vs, patient had a oral temperature of 101.4; Mt4 paged; awaiting orders. Patient still remains responsive at this time; will continue to monitor.

## 2018-02-10 NOTE — ASSESSMENT & PLAN NOTE
Na 164  on admit, likely secondary to dehydration in setting of decreased PO intake.Total water deficit 3.5L. Improving with D5, now hyperglycemia. Transitioned to 1/2 NS & D5 and added levemir 5u.    -trend BMP Q6H

## 2018-02-10 NOTE — ASSESSMENT & PLAN NOTE
-on nifedipine 60mg Q24H at home    -hypotensive on admit  -hold nifedipine in setting of sepsis  -BP well-controlled in hospital

## 2018-02-10 NOTE — ASSESSMENT & PLAN NOTE
On metformin 500mg BD at home. Last HbA1c 5.1% in 09/2017.    No with elevated BG likely secondary to D5.     -hold metformin  -transition to 1/2 NS & D5  -LDSC --> MDSC   -POCT accuchecks and FB with CMP

## 2018-02-10 NOTE — SUBJECTIVE & OBJECTIVE
Interval History: Noted by family to be sleeping all day and not eating, holds food in mouth but does not swallow.    Review of Systems   Unable to perform ROS: Dementia     Objective:     Vital Signs (Most Recent):  Temp: 98.5 °F (36.9 °C) (02/10/18 1146)  Pulse: 70 (02/10/18 1453)  Resp: 20 (02/10/18 1146)  BP: 134/60 (02/10/18 1146)  SpO2: 98 % (02/10/18 1216) Vital Signs (24h Range):  Temp:  [97.9 °F (36.6 °C)-101.4 °F (38.6 °C)] 98.5 °F (36.9 °C)  Pulse:  [] 70  Resp:  [16-24] 20  SpO2:  [81 %-99 %] 98 %  BP: (122-136)/(58-64) 134/60     Weight: 40.8 kg (89 lb 15.2 oz)  Body mass index is 17.57 kg/m².    Intake/Output Summary (Last 24 hours) at 02/10/18 1602  Last data filed at 02/10/18 1400   Gross per 24 hour   Intake              125 ml   Output              100 ml   Net               25 ml      Physical Exam   Constitutional: No distress.   cachectic   HENT:   Head: Normocephalic and atraumatic.   Eyes: Pupils are equal, round, and reactive to light.   Neck: Normal range of motion.   Cardiovascular: Normal rate, regular rhythm and normal heart sounds.    Pulmonary/Chest: Effort normal. No respiratory distress. She has no wheezes. She has no rales.   Abdominal: Soft. There is no tenderness.   Hypoactive bowel sounds   Neurological:   -More lethargic on exam, more difficult to arouse but still opens eyes to voice.   Skin: Skin is dry. No erythema.   -Hands cool; bilateral lower extremities warm       Significant Labs:   CBC:     Recent Labs  Lab 02/09/18  0204 02/10/18  0819   WBC 14.71* 16.18*   HGB 9.7* 8.6*   HCT 34.0* 30.1*    195     CMP:   Recent Labs  Lab 02/09/18  0204  02/10/18  0554 02/10/18  0819 02/10/18  1415   *  < > 152* 151* 149*   K 3.6  < > 4.0 3.7 3.5   *  < > 121* 121* 118*   CO2 27  < > 21* 23 23   *  < > 278* 245* 184*   BUN 30*  < > 32* 32* 32*   CREATININE 1.4  < > 1.8* 1.8* 1.8*   CALCIUM 8.4*  < > 8.4* 8.1* 8.3*   PROT 7.1  --  6.8  --   --    ALBUMIN  2.2*  --  2.1*  --   --    BILITOT 0.2  --  0.2  --   --    ALKPHOS 92  --  96  --   --    AST 18  --  22  --   --    ALT 11  --  12  --   --    ANIONGAP 10  < > 10 7* 8   EGFRNONAA 33.3*  < > 24.6* 24.6* 24.6*   < > = values in this interval not displayed.    Significant Imaging:    Imaging Results          X-Ray Chest 1 View (Final result)  Result time 02/09/18 12:58:39    Final result by Poli Varner MD (02/09/18 12:58:39)                 Impression:     As above.      Electronically signed by: Poli Varner MD  Date:     02/09/18  Time:    12:58              Narrative:    Comparison is made to 2/8/18.    Complete opacification of the left hemithorax with mediastinal shift to the ipsilateral left side and deformity of the osseous thoracic wall on the left are all again observed, consistent with a prior pneumonectomy/thoracoplasty procedure.  Right lung remains clear, and free of significant airspace consolidation or volume loss.  No pleural fluid on the right.  No pneumothorax.  No significant detrimental interval change in the appearance of the chest since 2/8/18 is appreciated.                             CT Head Without Contrast (Final result)  Result time 02/08/18 10:58:57    Final result by Michell Navarrete MD (02/08/18 10:58:57)                 Impression:         No evidence of acute hemorrhage or major vascular distribution infarct.    Generalized cerebral volume loss with chronic microvascular ischemic changes and a remote right cerebellar infarct.    Chronic inflammatory changes of the partially visualized paranasal sinuses.  ______________________________________     Electronically signed by resident: MICHELL HOLLINGSWORTH MD  Date:     02/08/18  Time:    09:39            As the supervising and teaching physician, I personally reviewed the images and resident's interpretation and I agree with the findings.          Electronically signed by: MICHELL NAVARRETE MD  Date:     02/08/18  Time:    10:58               Narrative:    CT head without contrast    02/08/18 08:45:24    Accession# [id number]    CLINICAL INDICATION: 89 year old F with Confusion/delirium, altered LOC, unexplained       TECHNIQUE: Axial CT images obtained throughout the region of the head without the use of intravenous contrast. Axial, sagittal and coronal reconstructions were performed.    COMPARISON: CT head 11/25/04    FINDINGS:    Moderate degree of generalized volume loss. Configuration does not suggest hydrocephalus.    No evidence of acute hemorrhage. No acute major vascular distribution infarct. Remote right cerebellar infarct. Mild/moderate chronic microvascular ischemic change. No parenchymal mass or mass effect.    No extra-axial blood or fluid collection.    The osseous calvarium is intact. Hyperostosis frontalis.    Essentially complete opacification of the partially visualized left maxillary sinus with surrounding osseous thickening and sclerosis of chronic sinus disease. Patchy mucosal thickening in the ethmoid air cells. Periosteal thickening in the right sphenoid sinus also consistent with chronic inflammation. The bilateral mastoid air cells are clear.                             X-Ray Chest AP Portable (Final result)  Result time 02/08/18 09:03:57    Final result by Poli Varner MD (02/08/18 09:03:57)                 Impression:     No significant abnormality directly referable to the current history of chest pain is observed.  No significant detrimental interval change in the appearance of the chest since 9/17/17 is appreciated.      Electronically signed by: Poli Varner MD  Date:     02/08/18  Time:    09:03              Narrative:    Comparison is made to 9/17/17.    Complete opacification of the left hemithorax and deformity of the thoracic wall on this side is again observed, stable in appearance since the prior exam and consistent with prior pneumonectomy/thoracoplasty.  Mediastinal shift to the left is again seen, and the left cardiac  border is completely obscured by the process in the left hemithorax.  Pulmonary vascularity in the right lung is normal, and the right lung is clear and free of significant airspace consolidation or volume loss.  No pleural fluid on the right.  No pneumothorax.  Calcification in the wall of the aortic arch is again observed, as is scoliosis convex to the left in the upper thoracic region.

## 2018-02-10 NOTE — ASSESSMENT & PLAN NOTE
2/4 SIRs (WBC 15, BP 90/50) with bacteremia and UTI on UA. CXR neg for PNA. Also risk of infection via decubitus wound. Initial fluid rescucitation 30cc/kg-total 1.5L Ns. IV ceftriaxone 2g in ED. Initial blood culture & urine cultures with E. Coli. Repeat blood cultures 02/09 NGTD.    Had been improving but overnight, febrile 101.9 and increased WBC.     -con't ceftriaxone 1g Q24H  -repeat blood cultures, lactate

## 2018-02-10 NOTE — PROGRESS NOTES
Spoke with Dr. Ramírez and notified him that pt's O2 sat was 81 % on room air this morning and pt now on 2 L  O2 N/C and she sat  90 %  and he said that's fine . Will keep monitoring .

## 2018-02-10 NOTE — PLAN OF CARE
Problem: Diabetes, Type 2 (Adult)  Goal: Signs and Symptoms of Listed Potential Problems Will be Absent, Minimized or Managed (Diabetes, Type 2)  Signs and symptoms of listed potential problems will be absent, minimized or managed by discharge/transition of care (reference Diabetes, Type 2 (Adult) CPG).   Outcome: Ongoing (interventions implemented as appropriate)  Pt's BG checked Q 6 hrs , insulin given as needed on sliding scale . Pt is free of S/S. Of diabetes type 2 complications .     Problem: Fall Risk (Adult)  Goal: Absence of Falls  Patient will demonstrate the desired outcomes by discharge/transition of care.   Outcome: Ongoing (interventions implemented as appropriate)  Pt is free of falls and injuries per shift , fall precautions maintained and family at bedside .

## 2018-02-10 NOTE — PROGRESS NOTES
[facility] Hospital Medicine  Progress Note    Patient Name: Delfina Pelletier  MRN: [id number]  Patient Class: IP- Inpatient   Admission Date: 2/8/2018  Length of Stay: 2 days  Attending Physician: Lion Ramírez MD  Primary Care Provider: Dimitri Castellanos MD    [facility] Medicine Team: [facility] HOSP MED 4 Ruby Stratton MD    Subjective:     Principal Problem:Severe sepsis with acute organ dysfunction due to Gram positive bacteria    HPI:  89 yr old female with PMHx dementia, HTN, HLD, DM 2 who presents to [facility] ED with chief complaint of fatigue. History obtained from family and chart review. Onset of fatigue yesterday morning. Family noted that patient was sleeping throughout the day, lethargic, and talking less. They assisted her back to bed but she was unable to get out of bed on her own and they decided to bring her to the ED. Associated with decreased PO intake the last few days. No fever, chills, chest pain, dyspnea, cough, vomiting, diarrhea, foul-smelling urine, hematuria, dysuria or distress noted. ROS positive for weight loss, declining mentation, constipation, and right buttock wound (appears worse than 4 wks ago, no blood, pus, or mal-odor noted.)    Patient admitted to observation in 08/2017 for dehydration with hypernatremia & UTI (viridians strep); around that time, patient eating less and losing weight. Patient also visited ED in 09/2017 for transient alteration in awareness; work-up negative and attributed to progressive dementia. History of UTIs with klebsiella, E. Coli, and viridians strep. Patient diagnosed with Alzheimer's disease 2 years ago; prior to that she had been socially active. Currently at baseline, patient is oriented only to self, she does not recognize daughter or sister (since 2 months ago.) She self-ambulates and self voids. Lives at home with daughter.    Hospital Course:  Patient presents to ED with chief complaints of lethargy and altered mental status. On  admit, patient is septic (WBC 15, hypotensive 90/50) with UTI on UA. Also hypernatremic (164), and CHERI with Cr 2.0 (baseline 1.0). In ED, patient is given 2g ceftriaxone and 1.5L NS. D5 drip started at rate of 50mL/hr. BMP ordered Q6H. On repeat BMPs, Na and CHERI slowly improving but K= 2.8 from 3.6. Given 80mEq IV potassium. Repeat BMP with improved potassium and Cr but Na elevated; increased rate of D5. Elevated Bg, transitioned to half-normal saline & D5 with addition of levemir. Creatinine also noted to be increasing. Overall, patient likely still severely dehydrated, optimizing fluid resuscitation with fluid boluses.    Interval History: Noted by family to be sleeping all day and not eating, holds food in mouth but does not swallow.    Review of Systems   Unable to perform ROS: Dementia     Objective:     Vital Signs (Most Recent):  Temp: 98.5 °F (36.9 °C) (02/10/18 1146)  Pulse: 70 (02/10/18 1453)  Resp: 20 (02/10/18 1146)  BP: 134/60 (02/10/18 1146)  SpO2: 98 % (02/10/18 1216) Vital Signs (24h Range):  Temp:  [97.9 °F (36.6 °C)-101.4 °F (38.6 °C)] 98.5 °F (36.9 °C)  Pulse:  [] 70  Resp:  [16-24] 20  SpO2:  [81 %-99 %] 98 %  BP: (122-136)/(58-64) 134/60     Weight: 40.8 kg (89 lb 15.2 oz)  Body mass index is 17.57 kg/m².    Intake/Output Summary (Last 24 hours) at 02/10/18 1602  Last data filed at 02/10/18 1400   Gross per 24 hour   Intake              125 ml   Output              100 ml   Net               25 ml      Physical Exam   Constitutional: No distress.   cachectic   HENT:   Head: Normocephalic and atraumatic.   Eyes: Pupils are equal, round, and reactive to light.   Neck: Normal range of motion.   Cardiovascular: Normal rate, regular rhythm and normal heart sounds.    Pulmonary/Chest: Effort normal. No respiratory distress. She has no wheezes. She has no rales.   Abdominal: Soft. There is no tenderness.   Hypoactive bowel sounds   Neurological:   -More lethargic on exam, more difficult to arouse  but still opens eyes to voice.   Skin: Skin is dry. No erythema.   -Hands cool; bilateral lower extremities warm       Significant Labs:   CBC:     Recent Labs  Lab 02/09/18  0204 02/10/18  0819   WBC 14.71* 16.18*   HGB 9.7* 8.6*   HCT 34.0* 30.1*    195     CMP:   Recent Labs  Lab 02/09/18  0204  02/10/18  0554 02/10/18  0819 02/10/18  1415   *  < > 152* 151* 149*   K 3.6  < > 4.0 3.7 3.5   *  < > 121* 121* 118*   CO2 27  < > 21* 23 23   *  < > 278* 245* 184*   BUN 30*  < > 32* 32* 32*   CREATININE 1.4  < > 1.8* 1.8* 1.8*   CALCIUM 8.4*  < > 8.4* 8.1* 8.3*   PROT 7.1  --  6.8  --   --    ALBUMIN 2.2*  --  2.1*  --   --    BILITOT 0.2  --  0.2  --   --    ALKPHOS 92  --  96  --   --    AST 18  --  22  --   --    ALT 11  --  12  --   --    ANIONGAP 10  < > 10 7* 8   EGFRNONAA 33.3*  < > 24.6* 24.6* 24.6*   < > = values in this interval not displayed.    Significant Imaging:    Imaging Results          X-Ray Chest 1 View (Final result)  Result time 02/09/18 12:58:39    Final result by Poli Varner MD (02/09/18 12:58:39)                 Impression:     As above.      Electronically signed by: Poli Varner MD  Date:     02/09/18  Time:    12:58              Narrative:    Comparison is made to 2/8/18.    Complete opacification of the left hemithorax with mediastinal shift to the ipsilateral left side and deformity of the osseous thoracic wall on the left are all again observed, consistent with a prior pneumonectomy/thoracoplasty procedure.  Right lung remains clear, and free of significant airspace consolidation or volume loss.  No pleural fluid on the right.  No pneumothorax.  No significant detrimental interval change in the appearance of the chest since 2/8/18 is appreciated.                             CT Head Without Contrast (Final result)  Result time 02/08/18 10:58:57    Final result by Balbir Saez MD (02/08/18 10:58:57)                 Impression:         No evidence of acute  hemorrhage or major vascular distribution infarct.    Generalized cerebral volume loss with chronic microvascular ischemic changes and a remote right cerebellar infarct.    Chronic inflammatory changes of the partially visualized paranasal sinuses.  ______________________________________     Electronically signed by resident: MICHELL HOLLINGSWORTH MD  Date:     02/08/18  Time:    09:39            As the supervising and teaching physician, I personally reviewed the images and resident's interpretation and I agree with the findings.          Electronically signed by: MICHELL NAVARRETE MD  Date:     02/08/18  Time:    10:58              Narrative:    CT head without contrast    02/08/18 08:45:24    Accession# [id number]    CLINICAL INDICATION: 89 year old F with Confusion/delirium, altered LOC, unexplained       TECHNIQUE: Axial CT images obtained throughout the region of the head without the use of intravenous contrast. Axial, sagittal and coronal reconstructions were performed.    COMPARISON: CT head 11/25/04    FINDINGS:    Moderate degree of generalized volume loss. Configuration does not suggest hydrocephalus.    No evidence of acute hemorrhage. No acute major vascular distribution infarct. Remote right cerebellar infarct. Mild/moderate chronic microvascular ischemic change. No parenchymal mass or mass effect.    No extra-axial blood or fluid collection.    The osseous calvarium is intact. Hyperostosis frontalis.    Essentially complete opacification of the partially visualized left maxillary sinus with surrounding osseous thickening and sclerosis of chronic sinus disease. Patchy mucosal thickening in the ethmoid air cells. Periosteal thickening in the right sphenoid sinus also consistent with chronic inflammation. The bilateral mastoid air cells are clear.                             X-Ray Chest AP Portable (Final result)  Result time 02/08/18 09:03:57    Final result by Poli Varner MD (02/08/18 09:03:57)                  Impression:     No significant abnormality directly referable to the current history of chest pain is observed.  No significant detrimental interval change in the appearance of the chest since 9/17/17 is appreciated.      Electronically signed by: Poli Varner MD  Date:     02/08/18  Time:    09:03              Narrative:    Comparison is made to 9/17/17.    Complete opacification of the left hemithorax and deformity of the thoracic wall on this side is again observed, stable in appearance since the prior exam and consistent with prior pneumonectomy/thoracoplasty.  Mediastinal shift to the left is again seen, and the left cardiac border is completely obscured by the process in the left hemithorax.  Pulmonary vascularity in the right lung is normal, and the right lung is clear and free of significant airspace consolidation or volume loss.  No pleural fluid on the right.  No pneumothorax.  Calcification in the wall of the aortic arch is again observed, as is scoliosis convex to the left in the upper thoracic region.                                Assessment/Plan:      * Severe sepsis with acute organ dysfunction due to Gram positive bacteria    2/4 SIRs (WBC 15, BP 90/50) with bacteremia and UTI on UA. CXR neg for PNA. Also risk of infection via decubitus wound. Initial fluid rescucitation 30cc/kg-total 1.5L Ns. IV ceftriaxone 2g in ED. Initial blood culture & urine cultures with E. Coli. Repeat blood cultures 02/09 NGTD.    Had been improving but overnight, febrile 101.9 and increased WBC.     -con't ceftriaxone 1g Q24H  -repeat blood cultures, lactate          Lethargy    89 yr old female presents to ED with increased lethargy and altered mental status in setting of sepsis with UTI, hypotension, hypernatremia, CHERI, and elevated troponin. Leading causes include infection with UTI although bacteremia and infected right buttock wound also concerning. Electrolyte abnormalities secondary to dehydration also likely  "contributing (hypernatremic 164.) Stroke possible (subdural in elderly vs ischemic secondary to hypotension; patient was weak in legs but likely secondary to fatigue; CThead neg for acute hemorrhage or major vascular distribution.)    -see "sepsis" for details regarding plan of care  -see "dehydration" for details regarding plan of care  -speech evaluated patient, recommend dental soft, honey thick with aspiration precautions        E. coli UTI    -UA with 3+ leuks, 40 WBC, many bacteria  -urine culture with E. Coli  -con't IV rocephin 1g Q24 hr          Dehydration    Likely secondary to decreased PO intake. Electrolyte abnormalities (Mg elevated at 3.0, hypernatremic 164) and hypotensive (SBP 90/50) on admit. Initially received 1.5L NS in setting of sepsis. Free water deficit 3.5L, started D5 IVF. Now with worsening CHERI that had initially improved with fluid rescusitation. Likely remains severely dehydrated, will be more aggressive with IV hydration    -bolusing 2L NS AND transitioning D5 to 1/2 NS & D5.  -monitoring BP-stable  -monitor electrolyte with repeat BMP          Hypernatremia    Na 164  on admit, likely secondary to dehydration in setting of decreased PO intake.Total water deficit 3.5L. Improving with D5, now hyperglycemia. Transitioned to 1/2 NS & D5 and added levemir 5u.    -trend BMP Q6H          CHERI (acute kidney injury)    -Cr 2.0 on admit baseline 0.8. Likely pre-renal secondary to decreased fluid intake and sepsis. Initially improving with IVF. Now worsening on repeat BMPs. Ordered urine studies but likely still remains severely dehydrated.    -more aggressive with IVF  -f/u urine studies  -strict I/O  -avoid nephrotoxic agents          Decubitus ulcer of buttock, unstageable    -wound care consulted, appreciate recs          Decubitus ulcer of right buttock, stage 2    Pressure ulcer right buttock. Appears worse than 4 weeks ago per family. No pus, blood, or mal-odor noted.    -ordered wound " "care, appreciate recs.        History of diabetes mellitus    On metformin 500mg BD at home. Last HbA1c 5.1% in 09/2017.    No with elevated BG likely secondary to D5.     -hold metformin  -transition to 1/2 NS & D5  -LDSC --> MDSC   -POCT accuchecks and FB with CMP          Essential hypertension    -on nifedipine 60mg Q24H at home    -hypotensive on admit  -hold nifedipine in setting of sepsis  -BP well-controlled in hospital        Anorexia    Likely contributing to dehydration. Evaluated by speech: recommend dental soft, honey thick. Ordered adult regular to limit restrictions in patient with baseline decreased appetite.    Today it was reported that patient holding food in mouth, not swallowing secondary to lethargy. Will make NPO with aspiration precautions for the time being.        Bacteremia due to Escherichia coli    -see "severe sepsis."          Cough    Cough noted overnight per daughter. SaO2 >92% overnight. Some crackles on exam. Considered pulmonary edema secondary to IVF. Obtained CXR-neg for acute change.     Will continue to monitor.            VTE Risk Mitigation         Ordered     heparin (porcine) injection 5,000 Units  Every 8 hours     Route:  Subcutaneous        02/08/18 2247     Medium Risk of VTE  Once      02/08/18 1303     Place IGNACIA hose  Until discontinued      02/08/18 1107     Place sequential compression device  Until discontinued      02/08/18 1107          Diet-NPO for now with aspiration precautions   DVT ppx-heparin 5000u SQ Q8H   Dispo- pending medical stability    Ruby Strtaton MD  Department of Hospital Medicine   [facility]  "

## 2018-02-10 NOTE — ASSESSMENT & PLAN NOTE
Likely secondary to decreased PO intake. Electrolyte abnormalities (Mg elevated at 3.0, hypernatremic 164) and hypotensive (SBP 90/50) on admit. Initially received 1.5L NS in setting of sepsis. Free water deficit 3.5L, started D5 IVF. Now with worsening CHERI that had initially improved with fluid rescusitation. Likely remains severely dehydrated, will be more aggressive with IV hydration    -bolusing 2L NS AND transitioning D5 to 1/2 NS & D5.  -monitoring BP-stable  -monitor electrolyte with repeat BMP

## 2018-02-10 NOTE — ASSESSMENT & PLAN NOTE
Likely contributing to dehydration. Evaluated by speech: recommend dental soft, honey thick. Ordered adult regular to limit restrictions in patient with baseline decreased appetite.    Today it was reported that patient holding food in mouth, not swallowing secondary to lethargy. Will make NPO with aspiration precautions for the time being.

## 2018-02-11 PROBLEM — R09.02 HYPOXIA: Status: ACTIVE | Noted: 2018-02-11

## 2018-02-11 LAB
ALBUMIN SERPL BCP-MCNC: 1.6 G/DL
ALP SERPL-CCNC: 85 U/L
ALT SERPL W/O P-5'-P-CCNC: 13 U/L
ANION GAP SERPL CALC-SCNC: 10 MMOL/L
ANION GAP SERPL CALC-SCNC: 9 MMOL/L
ANION GAP SERPL CALC-SCNC: 9 MMOL/L
AST SERPL-CCNC: 23 U/L
BACTERIA BLD CULT: NORMAL
BASOPHILS # BLD AUTO: 0.02 K/UL
BASOPHILS NFR BLD: 0.1 %
BILIRUB SERPL-MCNC: 0.2 MG/DL
BUN SERPL-MCNC: 28 MG/DL
BUN SERPL-MCNC: 29 MG/DL
BUN SERPL-MCNC: 29 MG/DL
CALCIUM SERPL-MCNC: 7.9 MG/DL
CALCIUM SERPL-MCNC: 7.9 MG/DL
CALCIUM SERPL-MCNC: 8 MG/DL
CHLORIDE SERPL-SCNC: 120 MMOL/L
CHLORIDE SERPL-SCNC: 121 MMOL/L
CHLORIDE SERPL-SCNC: 121 MMOL/L
CO2 SERPL-SCNC: 22 MMOL/L
CO2 SERPL-SCNC: 22 MMOL/L
CO2 SERPL-SCNC: 23 MMOL/L
CREAT SERPL-MCNC: 1.4 MG/DL
CREAT SERPL-MCNC: 1.5 MG/DL
CREAT SERPL-MCNC: 1.5 MG/DL
DIFFERENTIAL METHOD: ABNORMAL
EOSINOPHIL # BLD AUTO: 0.1 K/UL
EOSINOPHIL NFR BLD: 0.8 %
ERYTHROCYTE [DISTWIDTH] IN BLOOD BY AUTOMATED COUNT: 14 %
EST. GFR  (AFRICAN AMERICAN): 35.4 ML/MIN/1.73 M^2
EST. GFR  (AFRICAN AMERICAN): 35.4 ML/MIN/1.73 M^2
EST. GFR  (AFRICAN AMERICAN): 38.4 ML/MIN/1.73 M^2
EST. GFR  (NON AFRICAN AMERICAN): 30.7 ML/MIN/1.73 M^2
EST. GFR  (NON AFRICAN AMERICAN): 30.7 ML/MIN/1.73 M^2
EST. GFR  (NON AFRICAN AMERICAN): 33.3 ML/MIN/1.73 M^2
GLUCOSE SERPL-MCNC: 169 MG/DL
GLUCOSE SERPL-MCNC: 169 MG/DL
GLUCOSE SERPL-MCNC: 196 MG/DL
HCT VFR BLD AUTO: 32.2 %
HGB BLD-MCNC: 9.2 G/DL
IMM GRANULOCYTES # BLD AUTO: 0.13 K/UL
IMM GRANULOCYTES NFR BLD AUTO: 0.8 %
LYMPHOCYTES # BLD AUTO: 1.3 K/UL
LYMPHOCYTES NFR BLD: 8.5 %
MCH RBC QN AUTO: 27 PG
MCHC RBC AUTO-ENTMCNC: 28.6 G/DL
MCV RBC AUTO: 94 FL
MONOCYTES # BLD AUTO: 0.8 K/UL
MONOCYTES NFR BLD: 5.1 %
NEUTROPHILS # BLD AUTO: 13 K/UL
NEUTROPHILS NFR BLD: 84.7 %
NRBC BLD-RTO: 0 /100 WBC
PLATELET # BLD AUTO: 183 K/UL
PMV BLD AUTO: 13.3 FL
POCT GLUCOSE: 166 MG/DL (ref 70–110)
POCT GLUCOSE: 201 MG/DL (ref 70–110)
POCT GLUCOSE: 263 MG/DL (ref 70–110)
POCT GLUCOSE: 379 MG/DL (ref 70–110)
POTASSIUM SERPL-SCNC: 3.2 MMOL/L
POTASSIUM SERPL-SCNC: 3.6 MMOL/L
POTASSIUM SERPL-SCNC: 3.6 MMOL/L
PROT SERPL-MCNC: 5.9 G/DL
RBC # BLD AUTO: 3.41 M/UL
SODIUM SERPL-SCNC: 152 MMOL/L
SODIUM SERPL-SCNC: 152 MMOL/L
SODIUM SERPL-SCNC: 153 MMOL/L
WBC # BLD AUTO: 15.3 K/UL

## 2018-02-11 PROCEDURE — 85025 COMPLETE CBC W/AUTO DIFF WBC: CPT

## 2018-02-11 PROCEDURE — S5010 5% DEXTROSE AND 0.45% SALINE: HCPCS | Performed by: STUDENT IN AN ORGANIZED HEALTH CARE EDUCATION/TRAINING PROGRAM

## 2018-02-11 PROCEDURE — 63600175 PHARM REV CODE 636 W HCPCS: Performed by: STUDENT IN AN ORGANIZED HEALTH CARE EDUCATION/TRAINING PROGRAM

## 2018-02-11 PROCEDURE — 99232 SBSQ HOSP IP/OBS MODERATE 35: CPT | Mod: GC,,, | Performed by: HOSPITALIST

## 2018-02-11 PROCEDURE — 27000221 HC OXYGEN, UP TO 24 HOURS

## 2018-02-11 PROCEDURE — 11000001 HC ACUTE MED/SURG PRIVATE ROOM

## 2018-02-11 PROCEDURE — 36415 COLL VENOUS BLD VENIPUNCTURE: CPT

## 2018-02-11 PROCEDURE — 25000003 PHARM REV CODE 250: Performed by: STUDENT IN AN ORGANIZED HEALTH CARE EDUCATION/TRAINING PROGRAM

## 2018-02-11 PROCEDURE — 80053 COMPREHEN METABOLIC PANEL: CPT

## 2018-02-11 PROCEDURE — 80048 BASIC METABOLIC PNL TOTAL CA: CPT

## 2018-02-11 RX ORDER — CIPROFLOXACIN 250 MG/1
500 TABLET, FILM COATED ORAL EVERY 24 HOURS
Status: DISCONTINUED | OUTPATIENT
Start: 2018-02-11 | End: 2018-02-14 | Stop reason: HOSPADM

## 2018-02-11 RX ORDER — SODIUM CHLORIDE 450 MG/100ML
INJECTION, SOLUTION INTRAVENOUS CONTINUOUS
Status: DISCONTINUED | OUTPATIENT
Start: 2018-02-12 | End: 2018-02-12

## 2018-02-11 RX ORDER — POTASSIUM CHLORIDE 20 MEQ/1
20 TABLET, EXTENDED RELEASE ORAL ONCE
Status: COMPLETED | OUTPATIENT
Start: 2018-02-11 | End: 2018-02-11

## 2018-02-11 RX ORDER — NIFEDIPINE 60 MG/1
60 TABLET, EXTENDED RELEASE ORAL DAILY
Status: DISCONTINUED | OUTPATIENT
Start: 2018-02-11 | End: 2018-02-14 | Stop reason: HOSPADM

## 2018-02-11 RX ORDER — DEXTROSE MONOHYDRATE 50 MG/ML
INJECTION, SOLUTION INTRAVENOUS CONTINUOUS
Status: DISCONTINUED | OUTPATIENT
Start: 2018-02-11 | End: 2018-02-11

## 2018-02-11 RX ADMIN — DEXTROSE: 5 SOLUTION INTRAVENOUS at 08:02

## 2018-02-11 RX ADMIN — HEPARIN SODIUM 5000 UNITS: 5000 INJECTION, SOLUTION INTRAVENOUS; SUBCUTANEOUS at 09:02

## 2018-02-11 RX ADMIN — SODIUM CHLORIDE, SODIUM LACTATE, POTASSIUM CHLORIDE, AND CALCIUM CHLORIDE 1000 ML: .6; .31; .03; .02 INJECTION, SOLUTION INTRAVENOUS at 03:02

## 2018-02-11 RX ADMIN — ROSUVASTATIN CALCIUM 10 MG: 10 TABLET, FILM COATED ORAL at 10:02

## 2018-02-11 RX ADMIN — POLYETHYLENE GLYCOL 3350 17 G: 17 POWDER, FOR SOLUTION ORAL at 10:02

## 2018-02-11 RX ADMIN — POTASSIUM CHLORIDE 20 MEQ: 1500 TABLET, EXTENDED RELEASE ORAL at 06:02

## 2018-02-11 RX ADMIN — INSULIN ASPART 2 UNITS: 100 INJECTION, SOLUTION INTRAVENOUS; SUBCUTANEOUS at 12:02

## 2018-02-11 RX ADMIN — HEPARIN SODIUM 5000 UNITS: 5000 INJECTION, SOLUTION INTRAVENOUS; SUBCUTANEOUS at 02:02

## 2018-02-11 RX ADMIN — INSULIN ASPART 6 UNITS: 100 INJECTION, SOLUTION INTRAVENOUS; SUBCUTANEOUS at 06:02

## 2018-02-11 RX ADMIN — NIFEDIPINE 60 MG: 60 TABLET, FILM COATED, EXTENDED RELEASE ORAL at 12:02

## 2018-02-11 RX ADMIN — DONEPEZIL HYDROCHLORIDE 10 MG: 10 TABLET ORAL at 10:02

## 2018-02-11 RX ADMIN — INSULIN DETEMIR 5 UNITS: 100 INJECTION, SOLUTION SUBCUTANEOUS at 10:02

## 2018-02-11 RX ADMIN — INSULIN ASPART 4 UNITS: 100 INJECTION, SOLUTION INTRAVENOUS; SUBCUTANEOUS at 12:02

## 2018-02-11 RX ADMIN — CIPROFLOXACIN HYDROCHLORIDE 500 MG: 250 TABLET, FILM COATED ORAL at 12:02

## 2018-02-11 RX ADMIN — HEPARIN SODIUM 5000 UNITS: 5000 INJECTION, SOLUTION INTRAVENOUS; SUBCUTANEOUS at 05:02

## 2018-02-11 RX ADMIN — INSULIN ASPART 5 UNITS: 100 INJECTION, SOLUTION INTRAVENOUS; SUBCUTANEOUS at 11:02

## 2018-02-11 RX ADMIN — DEXTROSE AND SODIUM CHLORIDE: 5; .45 INJECTION, SOLUTION INTRAVENOUS at 02:02

## 2018-02-11 RX ADMIN — DOCUSATE SODIUM 250 MG: 50 CAPSULE, LIQUID FILLED ORAL at 10:02

## 2018-02-11 RX ADMIN — SODIUM CHLORIDE 1000 ML: 0.9 INJECTION, SOLUTION INTRAVENOUS at 12:02

## 2018-02-11 NOTE — SUBJECTIVE & OBJECTIVE
Interval History:   Patient is talking to daughter at bedside this morning. Simple sentences. AAO to self only. Denies fevers, chills, nausea, vomiting, or abdominal pain.     Review of Systems   Unable to perform ROS: Dementia     Objective:     Vital Signs (Most Recent):  Temp: 97.5 °F (36.4 °C) (02/11/18 0830)  Pulse: 80 (02/11/18 0830)  Resp: 16 (02/11/18 0830)  BP: (!) 156/69 (02/11/18 0830)  SpO2: 99 % (02/11/18 0830) Vital Signs (24h Range):  Temp:  [96.1 °F (35.6 °C)-99.8 °F (37.7 °C)] 97.5 °F (36.4 °C)  Pulse:  [64-87] 80  Resp:  [16-20] 16  SpO2:  [81 %-100 %] 99 %  BP: (116-169)/(57-69) 156/69     Weight: 40.8 kg (89 lb 15.2 oz)  Body mass index is 17.57 kg/m².    Intake/Output Summary (Last 24 hours) at 02/11/18 1049  Last data filed at 02/11/18 0900   Gross per 24 hour   Intake          1861.67 ml   Output                0 ml   Net          1861.67 ml      Physical Exam   Constitutional: No distress.   Cachectic, pleasant female.    HENT:   Head: Normocephalic and atraumatic.   Eyes: Pupils are equal, round, and reactive to light.   Neck: Normal range of motion.   Cardiovascular: Normal rate, regular rhythm and normal heart sounds.    Pulmonary/Chest: Effort normal. No respiratory distress. She has no wheezes. She has no rales.   Abdominal: Soft. There is no tenderness.   Hypoactive bowel sounds   Neurological: She is alert.   Now awake and alert (oriented to herself)   Skin: Skin is dry. No erythema.   -Hands cool; bilateral lower extremities warm       Significant Labs:   CBC:   Recent Labs  Lab 02/10/18  0819 02/11/18  0539   WBC 16.18* 15.30*   HGB 8.6* 9.2*   HCT 30.1* 32.2*    183     CMP:   Recent Labs  Lab 02/10/18  0554 02/10/18  0819 02/10/18  1415 02/11/18  0540   * 151* 149* 152*  152*   K 4.0 3.7 3.5 3.6  3.6   * 121* 118* 121*  121*   CO2 21* 23 23 22*  22*   * 245* 184* 169*  169*   BUN 32* 32* 32* 29*  29*   CREATININE 1.8* 1.8* 1.8* 1.5*  1.5*   CALCIUM  8.4* 8.1* 8.3* 7.9*  7.9*   PROT 6.8  --   --  5.9*   ALBUMIN 2.1*  --   --  1.6*   BILITOT 0.2  --   --  0.2   ALKPHOS 96  --   --  85   AST 22  --   --  23   ALT 12  --   --  13   ANIONGAP 10 7* 8 9  9   EGFRNONAA 24.6* 24.6* 24.6* 30.7*  30.7*

## 2018-02-11 NOTE — PROGRESS NOTES
[facility] Hospital Medicine  Progress Note    Patient Name: Delfina Pelletier  MRN: [id number]  Patient Class: IP- Inpatient   Admission Date: 2/8/2018  Length of Stay: 3 days  Attending Physician: Lion Ramírez MD  Primary Care Provider: Dimitri Castellanos MD    [facility] Medicine Team: [facility] – [place], [place] HOSP MED 4 Vahe Downing MD    Subjective:     Principal Problem:Bacteremia due to Escherichia coli    HPI:  89 yr old female with PMHx dementia, HTN, HLD, DM 2 who presents to [facility] ED with chief complaint of fatigue. History obtained from family and chart review. Onset of fatigue yesterday morning. Family noted that patient was sleeping throughout the day, lethargic, and talking less. They assisted her back to bed but she was unable to get out of bed on her own and they decided to bring her to the ED. Associated with decreased PO intake the last few days. No fever, chills, chest pain, dyspnea, cough, vomiting, diarrhea, foul-smelling urine, hematuria, dysuria or distress noted. ROS positive for weight loss, declining mentation, constipation, and right buttock wound (appears worse than 4 wks ago, no blood, pus, or mal-odor noted.)    Patient admitted to observation in 08/2017 for dehydration with hypernatremia & UTI (viridians strep); around that time, patient eating less and losing weight. Patient also visited ED in 09/2017 for transient alteration in awareness; work-up negative and attributed to progressive dementia. History of UTIs with klebsiella, E. Coli, and viridians strep. Patient diagnosed with Alzheimer's disease 2 years ago; prior to that she had been socially active. Currently at baseline, patient is oriented only to self, she does not recognize daughter or sister (since 2 months ago.) She self-ambulates and self voids. Lives at home with daughter.    Hospital Course:  Patient presents to ED with chief complaints of lethargy and altered mental status. On admit, patient is septic (WBC 15,  hypotensive 90/50) with UTI on UA. Also hypernatremic (164), and CHERI with Cr 2.0 (baseline 1.0). In ED, patient is given 2g ceftriaxone and 1.5L NS. D5 drip started at rate of 50mL/hr. BMP ordered Q6H. On repeat BMPs, Na and CHERI slowly improving but K= 2.8 from 3.6. Given 80mEq IV potassium. Repeat BMP with improved potassium and Cr but Na elevated; increased rate of D5. Elevated Bg, transitioned to half-normal saline & D5 with addition of levemir. Creatinine also noted to be increasing. Overall, patient likely still severely dehydrated, optimizing fluid resuscitation with fluid boluses.    Interval History:   Patient is talking to daughter at bedside this morning. Simple sentences. AAO to self only. Denies fevers, chills, nausea, vomiting, or abdominal pain.     Review of Systems   Unable to perform ROS: Dementia     Objective:     Vital Signs (Most Recent):  Temp: 97.5 °F (36.4 °C) (02/11/18 0830)  Pulse: 80 (02/11/18 0830)  Resp: 16 (02/11/18 0830)  BP: (!) 156/69 (02/11/18 0830)  SpO2: 99 % (02/11/18 0830) Vital Signs (24h Range):  Temp:  [96.1 °F (35.6 °C)-99.8 °F (37.7 °C)] 97.5 °F (36.4 °C)  Pulse:  [64-87] 80  Resp:  [16-20] 16  SpO2:  [81 %-100 %] 99 %  BP: (116-169)/(57-69) 156/69     Weight: 40.8 kg (89 lb 15.2 oz)  Body mass index is 17.57 kg/m².    Intake/Output Summary (Last 24 hours) at 02/11/18 1049  Last data filed at 02/11/18 0900   Gross per 24 hour   Intake          1861.67 ml   Output                0 ml   Net          1861.67 ml      Physical Exam   Constitutional: No distress.   Cachectic, pleasant female.    HENT:   Head: Normocephalic and atraumatic.   Eyes: Pupils are equal, round, and reactive to light.   Neck: Normal range of motion.   Cardiovascular: Normal rate, regular rhythm and normal heart sounds.    Pulmonary/Chest: Effort normal. No respiratory distress. She has no wheezes. She has no rales.   Abdominal: Soft. There is no tenderness.   Hypoactive bowel sounds   Neurological: She  is alert.   Now awake and alert (oriented to herself)   Skin: Skin is dry. No erythema.   -Hands cool; bilateral lower extremities warm       Significant Labs:   CBC:   Recent Labs  Lab 02/10/18  0819 02/11/18  0539   WBC 16.18* 15.30*   HGB 8.6* 9.2*   HCT 30.1* 32.2*    183     CMP:   Recent Labs  Lab 02/10/18  0554 02/10/18  0819 02/10/18  1415 02/11/18  0540   * 151* 149* 152*  152*   K 4.0 3.7 3.5 3.6  3.6   * 121* 118* 121*  121*   CO2 21* 23 23 22*  22*   * 245* 184* 169*  169*   BUN 32* 32* 32* 29*  29*   CREATININE 1.8* 1.8* 1.8* 1.5*  1.5*   CALCIUM 8.4* 8.1* 8.3* 7.9*  7.9*   PROT 6.8  --   --  5.9*   ALBUMIN 2.1*  --   --  1.6*   BILITOT 0.2  --   --  0.2   ALKPHOS 96  --   --  85   AST 22  --   --  23   ALT 12  --   --  13   ANIONGAP 10 7* 8 9  9   EGFRNONAA 24.6* 24.6* 24.6* 30.7*  30.7*         Assessment/Plan:      * Bacteremia due to Escherichia coli    - Presented septic on admission - leukocytosis (WBC 15) and hypotension (90/50) with GNR bacteremia and UTI on UA with CHERI and altered mentation. CXR neg for PNA. Also risk of infection via decubitus wound.   - Fluid resuscitated Initial fluid rescucitation 30cc/kg-total 1.5L Ns. IV   - Initial blood culture & urine cultures with pan-sensitive (with exception of tetracycline) E. Coli. Repeat blood cultures 02/09 NGTD.  - S/p 3 days of IV ceftriaxone with transition to PO ciprofloxacin on 2/11.   - Did have febrile event at 101.4 at 2/10 but none since.  - Mentation back to baseline on 2/11.             E. coli UTI    - UA with 3+ leuks, 40 WBC, many bacteria  - urine culture with E. Coli  - See bacteremia due to E. coli.           CHERI (acute kidney injury)    - Cr 2.0 on admit baseline 0.8. Likely pre-renal secondary to decreased fluid intake and sepsis.   - Improving to 1.5, continue with aggressive IVF and encouraging PO intake.   - Strict I/O and avoid nephrotoxic agents          Hypernatremia    - Na 164  on  admit, likely secondary to dehydration in setting of decreased PO intake.Total water deficit 3.5L.  - Improving with D5W gtt - now 152 (149 yesterday).  - Trending BMP q12h.           History of diabetes mellitus    - On metformin 500mg BD at home. Last HbA1c 5.1% in 09/2017.  - Some hyperglycemia considering D5W gtt - added levemir 5 U daily on 2/11 and continue with MDSSI.          Alteration in skin integrity              Decubitus ulcer of buttock, unstageable    - Wound care consulted, following their recs          Decubitus ulcer of right buttock, stage 2    - Wound care consulted, following their recs.         Acute encephalopathy    - Resolving, secondary to hypernatremia, septic encephalopathy, and advanced Alzheimer's dementia.  - Now at baseline.         Essential hypertension    - Continue home nifedipine 60mg Q24H.             VTE Risk Mitigation         Ordered     heparin (porcine) injection 5,000 Units  Every 8 hours     Route:  Subcutaneous        02/08/18 2247     Medium Risk of VTE  Once      02/08/18 1303     Place IGNACIA hose  Until discontinued      02/08/18 1107     Place sequential compression device  Until discontinued      02/08/18 1107        Disposition: Improving CHERI with IVF  - continue today. Placed back on D5W to help correct hypernatremia. Patient's mentation significantly improved this morning. Remains afebrile without leukocytosis - repeat cultures NGTD. Changed from IV to PO antibiotics today. Family meeting tentatively scheduled for tomorrow for placement. Intermittent hypoxia at night, currently on 2L nasal cannula - f/u CXR.     Vahe Downing MD  PGY-2 Internal Medicine  [phone]    Department of Hospital Medicine   [facility]

## 2018-02-11 NOTE — ASSESSMENT & PLAN NOTE
- Intermittent hypoxia at night, currently on 2L nasal cannula - f/u CXR.   - Possibly secondary to some pulmonary edema considering aggressive IVF here.   - Trial IV lasix.

## 2018-02-11 NOTE — ASSESSMENT & PLAN NOTE
- Cr 2.0 on admit baseline 0.8. Likely pre-renal secondary to decreased fluid intake and sepsis.   - Improving to 1.5, continue with aggressive IVF and encouraging PO intake.   - Strict I/O and avoid nephrotoxic agents

## 2018-02-11 NOTE — ASSESSMENT & PLAN NOTE
- Na 164  on admit, likely secondary to dehydration in setting of decreased PO intake.Total water deficit 3.5L.  - Improving with D5W gtt - now 152 (149 yesterday).  - Trending BMP q12h.

## 2018-02-11 NOTE — ASSESSMENT & PLAN NOTE
- Presented septic on admission - leukocytosis (WBC 15) and hypotension (90/50) with GNR bacteremia and UTI on UA with CHERI and altered mentation. CXR neg for PNA. Also risk of infection via decubitus wound.   - Fluid resuscitated Initial fluid rescucitation 30cc/kg-total 1.5L Ns. IV   - Initial blood culture & urine cultures with pan-sensitive (with exception of tetracycline) E. Coli. Repeat blood cultures 02/09 NGTD.  - S/p 3 days of IV ceftriaxone with transition to PO ciprofloxacin on 2/11.   - Did have febrile event at 101.4 at 2/10 but none since.  - Mentation back to baseline on 2/11.

## 2018-02-11 NOTE — ASSESSMENT & PLAN NOTE
- On metformin 500mg BD at home. Last HbA1c 5.1% in 09/2017.  - Some hyperglycemia considering D5W gtt - added levemir 5 U daily on 2/11 and continue with MDSSI.

## 2018-02-11 NOTE — ASSESSMENT & PLAN NOTE
- UA with 3+ leuks, 40 WBC, many bacteria  - urine culture with E. Coli  - See bacteremia due to E. coli.

## 2018-02-11 NOTE — ASSESSMENT & PLAN NOTE
- Resolving, secondary to hypernatremia, septic encephalopathy, and advanced Alzheimer's dementia.  - Now at baseline.

## 2018-02-12 PROBLEM — D50.0 NORMOCYTIC ANEMIA DUE TO BLOOD LOSS: Status: ACTIVE | Noted: 2018-02-12

## 2018-02-12 LAB
ABO + RH BLD: NORMAL
ALBUMIN SERPL BCP-MCNC: 1.4 G/DL
ALP SERPL-CCNC: 88 U/L
ALT SERPL W/O P-5'-P-CCNC: 13 U/L
ANION GAP SERPL CALC-SCNC: 5 MMOL/L
ANION GAP SERPL CALC-SCNC: 5 MMOL/L
ANION GAP SERPL CALC-SCNC: 8 MMOL/L
ANISOCYTOSIS BLD QL SMEAR: SLIGHT
AST SERPL-CCNC: 21 U/L
BASOPHILS # BLD AUTO: 0.01 K/UL
BASOPHILS # BLD AUTO: 0.01 K/UL
BASOPHILS # BLD AUTO: 0.02 K/UL
BASOPHILS NFR BLD: 0.1 %
BILIRUB SERPL-MCNC: 0.1 MG/DL
BLD GP AB SCN CELLS X3 SERPL QL: NORMAL
BUN SERPL-MCNC: 36 MG/DL
BUN SERPL-MCNC: 36 MG/DL
BUN SERPL-MCNC: 40 MG/DL
BURR CELLS BLD QL SMEAR: ABNORMAL
CALCIUM SERPL-MCNC: 7.7 MG/DL
CHLORIDE SERPL-SCNC: 114 MMOL/L
CHLORIDE SERPL-SCNC: 117 MMOL/L
CHLORIDE SERPL-SCNC: 117 MMOL/L
CO2 SERPL-SCNC: 25 MMOL/L
CREAT SERPL-MCNC: 1.3 MG/DL
CREAT SERPL-MCNC: 1.4 MG/DL
CREAT SERPL-MCNC: 1.4 MG/DL
DIFFERENTIAL METHOD: ABNORMAL
EOSINOPHIL # BLD AUTO: 0.1 K/UL
EOSINOPHIL NFR BLD: 0.3 %
EOSINOPHIL NFR BLD: 0.4 %
EOSINOPHIL NFR BLD: 0.7 %
ERYTHROCYTE [DISTWIDTH] IN BLOOD BY AUTOMATED COUNT: 13.9 %
ERYTHROCYTE [DISTWIDTH] IN BLOOD BY AUTOMATED COUNT: 14 %
ERYTHROCYTE [DISTWIDTH] IN BLOOD BY AUTOMATED COUNT: 14.1 %
EST. GFR  (AFRICAN AMERICAN): 38.4 ML/MIN/1.73 M^2
EST. GFR  (AFRICAN AMERICAN): 38.4 ML/MIN/1.73 M^2
EST. GFR  (AFRICAN AMERICAN): 42 ML/MIN/1.73 M^2
EST. GFR  (NON AFRICAN AMERICAN): 33.3 ML/MIN/1.73 M^2
EST. GFR  (NON AFRICAN AMERICAN): 33.3 ML/MIN/1.73 M^2
EST. GFR  (NON AFRICAN AMERICAN): 36.5 ML/MIN/1.73 M^2
GLUCOSE SERPL-MCNC: 247 MG/DL
GLUCOSE SERPL-MCNC: 269 MG/DL
GLUCOSE SERPL-MCNC: 269 MG/DL
HCT VFR BLD AUTO: 23.6 %
HCT VFR BLD AUTO: 23.6 %
HCT VFR BLD AUTO: 24.8 %
HGB BLD-MCNC: 6.9 G/DL
HGB BLD-MCNC: 7.1 G/DL
HGB BLD-MCNC: 7.1 G/DL
IMM GRANULOCYTES # BLD AUTO: 0.11 K/UL
IMM GRANULOCYTES # BLD AUTO: 0.16 K/UL
IMM GRANULOCYTES # BLD AUTO: 0.16 K/UL
IMM GRANULOCYTES NFR BLD AUTO: 0.8 %
IMM GRANULOCYTES NFR BLD AUTO: 1.1 %
IMM GRANULOCYTES NFR BLD AUTO: 1.2 %
LACTATE SERPL-SCNC: 1.3 MMOL/L
LYMPHOCYTES # BLD AUTO: 1.1 K/UL
LYMPHOCYTES # BLD AUTO: 1.1 K/UL
LYMPHOCYTES # BLD AUTO: 1.2 K/UL
LYMPHOCYTES NFR BLD: 7.4 %
LYMPHOCYTES NFR BLD: 8 %
LYMPHOCYTES NFR BLD: 8.6 %
MAGNESIUM SERPL-MCNC: 1.9 MG/DL
MCH RBC QN AUTO: 26.9 PG
MCH RBC QN AUTO: 27 PG
MCH RBC QN AUTO: 27.2 PG
MCHC RBC AUTO-ENTMCNC: 28.6 G/DL
MCHC RBC AUTO-ENTMCNC: 29.2 G/DL
MCHC RBC AUTO-ENTMCNC: 30.1 G/DL
MCV RBC AUTO: 90 FL
MCV RBC AUTO: 92 FL
MCV RBC AUTO: 94 FL
MONOCYTES # BLD AUTO: 0.7 K/UL
MONOCYTES # BLD AUTO: 0.7 K/UL
MONOCYTES # BLD AUTO: 0.8 K/UL
MONOCYTES NFR BLD: 5 %
MONOCYTES NFR BLD: 5.2 %
MONOCYTES NFR BLD: 5.9 %
NEUTROPHILS # BLD AUTO: 11.7 K/UL
NEUTROPHILS # BLD AUTO: 11.8 K/UL
NEUTROPHILS # BLD AUTO: 12.5 K/UL
NEUTROPHILS NFR BLD: 84.2 %
NEUTROPHILS NFR BLD: 84.5 %
NEUTROPHILS NFR BLD: 86.4 %
NRBC BLD-RTO: 0 /100 WBC
OVALOCYTES BLD QL SMEAR: ABNORMAL
PHOSPHATE SERPL-MCNC: 1.5 MG/DL
PLATELET # BLD AUTO: 171 K/UL
PLATELET # BLD AUTO: 193 K/UL
PLATELET # BLD AUTO: 201 K/UL
PLATELET BLD QL SMEAR: ABNORMAL
PMV BLD AUTO: 13.2 FL
PMV BLD AUTO: 13.5 FL
PMV BLD AUTO: 13.7 FL
POCT GLUCOSE: 188 MG/DL (ref 70–110)
POCT GLUCOSE: 224 MG/DL (ref 70–110)
POCT GLUCOSE: 243 MG/DL (ref 70–110)
POCT GLUCOSE: 255 MG/DL (ref 70–110)
POCT GLUCOSE: 258 MG/DL (ref 70–110)
POIKILOCYTOSIS BLD QL SMEAR: SLIGHT
POLYCHROMASIA BLD QL SMEAR: ABNORMAL
POTASSIUM SERPL-SCNC: 3.4 MMOL/L
POTASSIUM SERPL-SCNC: 3.4 MMOL/L
POTASSIUM SERPL-SCNC: 3.5 MMOL/L
PROT SERPL-MCNC: 5.3 G/DL
RBC # BLD AUTO: 2.56 M/UL
RBC # BLD AUTO: 2.61 M/UL
RBC # BLD AUTO: 2.64 M/UL
SODIUM SERPL-SCNC: 147 MMOL/L
WBC # BLD AUTO: 13.91 K/UL
WBC # BLD AUTO: 13.93 K/UL
WBC # BLD AUTO: 14.5 K/UL

## 2018-02-12 PROCEDURE — 80053 COMPREHEN METABOLIC PANEL: CPT

## 2018-02-12 PROCEDURE — 85025 COMPLETE CBC W/AUTO DIFF WBC: CPT | Mod: 91

## 2018-02-12 PROCEDURE — 86901 BLOOD TYPING SEROLOGIC RH(D): CPT

## 2018-02-12 PROCEDURE — 25000003 PHARM REV CODE 250: Performed by: STUDENT IN AN ORGANIZED HEALTH CARE EDUCATION/TRAINING PROGRAM

## 2018-02-12 PROCEDURE — 36415 COLL VENOUS BLD VENIPUNCTURE: CPT

## 2018-02-12 PROCEDURE — 11000001 HC ACUTE MED/SURG PRIVATE ROOM

## 2018-02-12 PROCEDURE — 83735 ASSAY OF MAGNESIUM: CPT

## 2018-02-12 PROCEDURE — 83605 ASSAY OF LACTIC ACID: CPT

## 2018-02-12 PROCEDURE — 80048 BASIC METABOLIC PNL TOTAL CA: CPT

## 2018-02-12 PROCEDURE — 86920 COMPATIBILITY TEST SPIN: CPT

## 2018-02-12 PROCEDURE — 84100 ASSAY OF PHOSPHORUS: CPT

## 2018-02-12 PROCEDURE — 63600175 PHARM REV CODE 636 W HCPCS: Performed by: STUDENT IN AN ORGANIZED HEALTH CARE EDUCATION/TRAINING PROGRAM

## 2018-02-12 PROCEDURE — 99232 SBSQ HOSP IP/OBS MODERATE 35: CPT | Mod: GC,,, | Performed by: HOSPITALIST

## 2018-02-12 RX ORDER — FAMOTIDINE 20 MG/1
20 TABLET, FILM COATED ORAL DAILY
Status: DISCONTINUED | OUTPATIENT
Start: 2018-02-12 | End: 2018-02-14 | Stop reason: HOSPADM

## 2018-02-12 RX ORDER — SODIUM,POTASSIUM PHOSPHATES 280-250MG
1 POWDER IN PACKET (EA) ORAL
Status: COMPLETED | OUTPATIENT
Start: 2018-02-12 | End: 2018-02-12

## 2018-02-12 RX ORDER — SODIUM CHLORIDE 450 MG/100ML
INJECTION, SOLUTION INTRAVENOUS CONTINUOUS
Status: DISCONTINUED | OUTPATIENT
Start: 2018-02-12 | End: 2018-02-12

## 2018-02-12 RX ORDER — METFORMIN HYDROCHLORIDE 500 MG/1
500 TABLET ORAL 2 TIMES DAILY WITH MEALS
Qty: 180 TABLET | Refills: 3 | OUTPATIENT
Start: 2018-02-12 | End: 2019-02-12

## 2018-02-12 RX ORDER — FUROSEMIDE 10 MG/ML
40 INJECTION INTRAMUSCULAR; INTRAVENOUS ONCE
Status: COMPLETED | OUTPATIENT
Start: 2018-02-12 | End: 2018-02-12

## 2018-02-12 RX ORDER — INSULIN ASPART 100 [IU]/ML
1-10 INJECTION, SOLUTION INTRAVENOUS; SUBCUTANEOUS EVERY 4 HOURS PRN
Status: DISCONTINUED | OUTPATIENT
Start: 2018-02-12 | End: 2018-02-14 | Stop reason: HOSPADM

## 2018-02-12 RX ADMIN — CIPROFLOXACIN HYDROCHLORIDE 500 MG: 250 TABLET, FILM COATED ORAL at 09:02

## 2018-02-12 RX ADMIN — HEPARIN SODIUM 5000 UNITS: 5000 INJECTION, SOLUTION INTRAVENOUS; SUBCUTANEOUS at 09:02

## 2018-02-12 RX ADMIN — HEPARIN SODIUM 5000 UNITS: 5000 INJECTION, SOLUTION INTRAVENOUS; SUBCUTANEOUS at 05:02

## 2018-02-12 RX ADMIN — FUROSEMIDE 40 MG: 10 INJECTION, SOLUTION INTRAMUSCULAR; INTRAVENOUS at 11:02

## 2018-02-12 RX ADMIN — DONEPEZIL HYDROCHLORIDE 10 MG: 10 TABLET ORAL at 09:02

## 2018-02-12 RX ADMIN — POTASSIUM & SODIUM PHOSPHATES POWDER PACK 280-160-250 MG 1 PACKET: 280-160-250 PACK at 11:02

## 2018-02-12 RX ADMIN — INSULIN ASPART 6 UNITS: 100 INJECTION, SOLUTION INTRAVENOUS; SUBCUTANEOUS at 06:02

## 2018-02-12 RX ADMIN — SODIUM CHLORIDE: 0.45 INJECTION, SOLUTION INTRAVENOUS at 12:02

## 2018-02-12 RX ADMIN — NIFEDIPINE 60 MG: 60 TABLET, FILM COATED, EXTENDED RELEASE ORAL at 09:02

## 2018-02-12 RX ADMIN — HEPARIN SODIUM 5000 UNITS: 5000 INJECTION, SOLUTION INTRAVENOUS; SUBCUTANEOUS at 01:02

## 2018-02-12 RX ADMIN — INSULIN ASPART 2 UNITS: 100 INJECTION, SOLUTION INTRAVENOUS; SUBCUTANEOUS at 10:02

## 2018-02-12 RX ADMIN — FAMOTIDINE 20 MG: 20 TABLET, FILM COATED ORAL at 11:02

## 2018-02-12 RX ADMIN — POTASSIUM & SODIUM PHOSPHATES POWDER PACK 280-160-250 MG 1 PACKET: 280-160-250 PACK at 09:02

## 2018-02-12 RX ADMIN — ROSUVASTATIN CALCIUM 10 MG: 10 TABLET, FILM COATED ORAL at 09:02

## 2018-02-12 RX ADMIN — POTASSIUM & SODIUM PHOSPHATES POWDER PACK 280-160-250 MG 1 PACKET: 280-160-250 PACK at 06:02

## 2018-02-12 RX ADMIN — POTASSIUM BICARBONATE 50 MEQ: 25 TABLET, EFFERVESCENT ORAL at 09:02

## 2018-02-12 RX ADMIN — INSULIN ASPART 4 UNITS: 100 INJECTION, SOLUTION INTRAVENOUS; SUBCUTANEOUS at 11:02

## 2018-02-12 NOTE — ASSESSMENT & PLAN NOTE
- Hemoglobin drop from 9.2 to 7.1 overnight - likely secondary to dilutional anemia (received 2L bolus and IV infusions) with GI bleed - reports of BM by daughter but not dark in appearance.  - Added H2 blocker and ordered FOBT stool.  - Monitor CBC twice daily; patient type and screened and blood consent will be attained.  - Transfused with Hgb<7.

## 2018-02-12 NOTE — MEDICAL/APP STUDENT
Subjective:       Patient ID: Dlefina Pelletier is a 89 y.o. female.    Chief Complaint: Fatigue (not verbally responsive and not following commands; doesnt always talk, due to dementia )    Ms. Delfina Pelletier is an 89-year-old -American female with a past Hx of Alzheimer's Disease, HTN, and T2DM who presented to the ED at the request of her daughter due to acute altered mental status. Over the last 24 hours, her daughter Naomi who resides with Ms. Pelletier has noted that her mother has become increasingly lethargic and not verbally responsive. Over the last 24 hours, Ms. Pelletier mostly lay in bed and had a reduced appetite, refusing to eat more than a few bites of food, and drinking very little. She also has had a several day history of fever and chills, raising concerns about possible infective cause of her presentation. With regards to her communication, she has been speaking mostly in mumbles, raising concerns about her possible mental state. At present, her family denies cough, sore throat, or wheezing.  The family denies chest pain or palpitations. They also deny diarrhea, hematuria, or dysuria. The deny Ms. Pelletier being around any sick contacts.     Her presentation of acute altered mental status is similar to an episode she had in August of 2017, that required hospitalization. Then, she presented with acute AMS and was subsequently diagnosed with Strep. Viridans UTI. At baseline, Ms. Pelletier is able to ambulate with a walker and void on her own. She was once a very sociable person in her local community, but has had a decline in her mentation in the last two months. In this two month period, he has become increasingly forgetful, at times forgetting who her own children or sisters are. She also has had a reduced ability to communicate, using just simple sentences. This is likely due to progression of her AD.     *Ms. Pelletier due to her altered mental state was unable to elaborate on her current condition,  and history was determined per ED notes and per family who are at her bedside.       Hospital Course:  02/09/18  02/10/18  02/11/18:  02/12/18    Review of Systems   Unable to perform ROS: Mental status change       Objective:          Vitals   02/11 0700  02/12 0659 02/12 0700 02/12 0859 Most Recent   Temp (°F) 97.5-99.2 98.5 98.5 (36.9)   Pulse 75-89 70-75 75   Resp 16-21 18 18   //69 123/56 123/56   MAP (mmHg) 73-99   73   SpO2 (%) 88-99 98 98     The patients weighs 40.8 kg (89 lbs) and has a BMI of 17.4     Physical Exam   Constitutional:   Somnolent but arousable, thin body habitus   HENT:   Head: Normocephalic and atraumatic.   Cardiovascular: Normal rate, regular rhythm and intact distal pulses.    Pulmonary/Chest: Effort normal. No respiratory distress. She has decreased breath sounds in the left upper field, the left middle field and the left lower field. She has wheezes in the right upper field and the left upper field.   Abdominal: Soft. Normal appearance and bowel sounds are normal. There is no tenderness. There is no rigidity and no guarding.          Labs  Recent Results (from the past 24 hour(s))   POCT glucose    Collection Time: 02/11/18 12:16 PM   Result Value Ref Range    POCT Glucose 201 (H) 70 - 110 mg/dL   Basic metabolic panel    Collection Time: 02/11/18  3:58 PM   Result Value Ref Range    Sodium 153 (H) 136 - 145 mmol/L    Potassium 3.2 (L) 3.5 - 5.1 mmol/L    Chloride 120 (H) 95 - 110 mmol/L    CO2 23 23 - 29 mmol/L    Glucose 196 (H) 70 - 110 mg/dL    BUN, Bld 28 (H) 8 - 23 mg/dL    Creatinine 1.4 0.5 - 1.4 mg/dL    Calcium 8.0 (L) 8.7 - 10.5 mg/dL    Anion Gap 10 8 - 16 mmol/L    eGFR if African American 38.4 (A) >60 mL/min/1.73 m^2    eGFR if non  33.3 (A) >60 mL/min/1.73 m^2   POCT glucose    Collection Time: 02/11/18  6:05 PM   Result Value Ref Range    POCT Glucose 263 (H) 70 - 110 mg/dL   POCT glucose    Collection Time: 02/11/18 11:40 PM   Result Value  Ref Range    POCT Glucose 379 (H) 70 - 110 mg/dL   Comprehensive metabolic panel    Collection Time: 02/12/18  3:45 AM   Result Value Ref Range    Sodium 147 (H) 136 - 145 mmol/L    Potassium 3.4 (L) 3.5 - 5.1 mmol/L    Chloride 117 (H) 95 - 110 mmol/L    CO2 25 23 - 29 mmol/L    Glucose 269 (H) 70 - 110 mg/dL    BUN, Bld 36 (H) 8 - 23 mg/dL    Creatinine 1.4 0.5 - 1.4 mg/dL    Calcium 7.7 (L) 8.7 - 10.5 mg/dL    Total Protein 5.3 (L) 6.0 - 8.4 g/dL    Albumin 1.4 (L) 3.5 - 5.2 g/dL    Total Bilirubin 0.1 0.1 - 1.0 mg/dL    Alkaline Phosphatase 88 55 - 135 U/L    AST 21 10 - 40 U/L    ALT 13 10 - 44 U/L    Anion Gap 5 (L) 8 - 16 mmol/L    eGFR if African American 38.4 (A) >60 mL/min/1.73 m^2    eGFR if non  33.3 (A) >60 mL/min/1.73 m^2   CBC auto differential    Collection Time: 02/12/18  3:45 AM   Result Value Ref Range    WBC 14.50 (H) 3.90 - 12.70 K/uL    RBC 2.56 (L) 4.00 - 5.40 M/uL    Hemoglobin 6.9 (L) 12.0 - 16.0 g/dL    Hematocrit 23.6 (L) 37.0 - 48.5 %    MCV 92 82 - 98 fL    MCH 27.0 27.0 - 31.0 pg    MCHC 29.2 (L) 32.0 - 36.0 g/dL    RDW 13.9 11.5 - 14.5 %    Platelets 193 150 - 350 K/uL    MPV 13.7 (H) 9.2 - 12.9 fL    Immature Granulocytes 0.8 (H) 0.0 - 0.5 %    Gran # (ANC) 12.5 (H) 1.8 - 7.7 K/uL    Immature Grans (Abs) 0.11 (H) 0.00 - 0.04 K/uL    Lymph # 1.1 1.0 - 4.8 K/uL    Mono # 0.7 0.3 - 1.0 K/uL    Eos # 0.1 0.0 - 0.5 K/uL    Baso # 0.01 0.00 - 0.20 K/uL    nRBC 0 0 /100 WBC    Gran% 86.4 (H) 38.0 - 73.0 %    Lymph% 7.4 (L) 18.0 - 48.0 %    Mono% 5.0 4.0 - 15.0 %    Eosinophil% 0.3 0.0 - 8.0 %    Basophil% 0.1 0.0 - 1.9 %    Differential Method Automated    Basic metabolic panel    Collection Time: 02/12/18  3:45 AM   Result Value Ref Range    Sodium 147 (H) 136 - 145 mmol/L    Potassium 3.4 (L) 3.5 - 5.1 mmol/L    Chloride 117 (H) 95 - 110 mmol/L    CO2 25 23 - 29 mmol/L    Glucose 269 (H) 70 - 110 mg/dL    BUN, Bld 36 (H) 8 - 23 mg/dL    Creatinine 1.4 0.5 - 1.4 mg/dL     Calcium 7.7 (L) 8.7 - 10.5 mg/dL    Anion Gap 5 (L) 8 - 16 mmol/L    eGFR if African American 38.4 (A) >60 mL/min/1.73 m^2    eGFR if non  33.3 (A) >60 mL/min/1.73 m^2   Magnesium    Collection Time: 02/12/18  3:45 AM   Result Value Ref Range    Magnesium 1.9 1.6 - 2.6 mg/dL   Phosphorus    Collection Time: 02/12/18  3:45 AM   Result Value Ref Range    Phosphorus 1.5 (L) 2.7 - 4.5 mg/dL   Lactic acid, plasma    Collection Time: 02/12/18  3:45 AM   Result Value Ref Range    Lactate (Lactic Acid) 1.3 0.5 - 2.2 mmol/L   POCT glucose    Collection Time: 02/12/18  3:56 AM   Result Value Ref Range    POCT Glucose 255 (H) 70 - 110 mg/dL   CBC auto differential    Collection Time: 02/12/18  8:03 AM   Result Value Ref Range    WBC 13.93 (H) 3.90 - 12.70 K/uL    RBC 2.64 (L) 4.00 - 5.40 M/uL    Hemoglobin 7.1 (L) 12.0 - 16.0 g/dL    Hematocrit 24.8 (L) 37.0 - 48.5 %    MCV 94 82 - 98 fL    MCH 26.9 (L) 27.0 - 31.0 pg    MCHC 28.6 (L) 32.0 - 36.0 g/dL    RDW 14.0 11.5 - 14.5 %    Platelets 171 150 - 350 K/uL    MPV 13.2 (H) 9.2 - 12.9 fL        Imaging Results          X-Ray Chest 1 View (Final result)  Result time 02/11/18 11:58:41    Final result by Librado Jhaveri III, MD (02/11/18 11:58:41)                 Narrative:    One view: There is cardiomegaly, moderate CHF, a left pneumonectomy and shift of heart to the left.      Electronically signed by: LIBRADO JHAVERI MD  Date:     02/11/18  Time:    11:58                              X-Ray Chest 1 View (Final result)  Result time 02/09/18 12:58:39    Final result by Poli Varner MD (02/09/18 12:58:39)                 Impression:     As above.      Electronically signed by: Poli Varner MD  Date:     02/09/18  Time:    12:58              Narrative:    Comparison is made to 2/8/18.    Complete opacification of the left hemithorax with mediastinal shift to the ipsilateral left side and deformity of the osseous thoracic wall on the left are all again observed,  consistent with a prior pneumonectomy/thoracoplasty procedure.  Right lung remains clear, and free of significant airspace consolidation or volume loss.  No pleural fluid on the right.  No pneumothorax.  No significant detrimental interval change in the appearance of the chest since 2/8/18 is appreciated.                             CT Head Without Contrast (Final result)  Result time 02/08/18 10:58:57    Final result by Michell Navarrete MD (02/08/18 10:58:57)                 Impression:         No evidence of acute hemorrhage or major vascular distribution infarct.    Generalized cerebral volume loss with chronic microvascular ischemic changes and a remote right cerebellar infarct.    Chronic inflammatory changes of the partially visualized paranasal sinuses.  ______________________________________     Electronically signed by resident: MICHELL HOLLINGSWORTH MD  Date:     02/08/18  Time:    09:39            As the supervising and teaching physician, I personally reviewed the images and resident's interpretation and I agree with the findings.          Electronically signed by: MICHELL NAVARRETE MD  Date:     02/08/18  Time:    10:58              Narrative:    CT head without contrast    02/08/18 08:45:24    Accession# [id number]    CLINICAL INDICATION: 89 year old F with Confusion/delirium, altered LOC, unexplained       TECHNIQUE: Axial CT images obtained throughout the region of the head without the use of intravenous contrast. Axial, sagittal and coronal reconstructions were performed.    COMPARISON: CT head 11/25/04    FINDINGS:    Moderate degree of generalized volume loss. Configuration does not suggest hydrocephalus.    No evidence of acute hemorrhage. No acute major vascular distribution infarct. Remote right cerebellar infarct. Mild/moderate chronic microvascular ischemic change. No parenchymal mass or mass effect.    No extra-axial blood or fluid collection.    The osseous calvarium is intact. Hyperostosis  frontalis.    Essentially complete opacification of the partially visualized left maxillary sinus with surrounding osseous thickening and sclerosis of chronic sinus disease. Patchy mucosal thickening in the ethmoid air cells. Periosteal thickening in the right sphenoid sinus also consistent with chronic inflammation. The bilateral mastoid air cells are clear.                             X-Ray Chest AP Portable (Final result)  Result time 02/08/18 09:03:57    Final result by Poli Varner MD (02/08/18 09:03:57)                 Impression:     No significant abnormality directly referable to the current history of chest pain is observed.  No significant detrimental interval change in the appearance of the chest since 9/17/17 is appreciated.      Electronically signed by: Poli Varner MD  Date:     02/08/18  Time:    09:03              Narrative:    Comparison is made to 9/17/17.    Complete opacification of the left hemithorax and deformity of the thoracic wall on this side is again observed, stable in appearance since the prior exam and consistent with prior pneumonectomy/thoracoplasty.  Mediastinal shift to the left is again seen, and the left cardiac border is completely obscured by the process in the left hemithorax.  Pulmonary vascularity in the right lung is normal, and the right lung is clear and free of significant airspace consolidation or volume loss.  No pleural fluid on the right.  No pneumothorax.  Calcification in the wall of the aortic arch is again observed, as is scoliosis convex to the left in the upper thoracic region.                                Assessment:       1. Hypernatremia    2. ST segment changes on electrocardiogram    3. Severe sepsis with acute organ dysfunction due to Gram positive bacteria        Plan:       Bacteremia due to Escherichia coli     - Presented septic on admission - leukocytosis (WBC 15) and hypotension (90/50)   -Workup indicated UTI, and CHERI; CXR neg for PNA. Also risk  of infection via decubitus wound.   - Initial fluid resuscitation 30cc/kg-total 1.5L Ns. IV   - S/p 3 days of IV ceftriaxone with transition to PO ciprofloxacin on 02/11, Day 2 of Ciprofloxacin  -- Mentation back to baseline on 2/12.             E. coli UTI     - UA with 3+ leuks, 40 WBC, many bacteria  - urine culture with E. Coli  - See bacteremia due to E. coli.           CHERI (acute kidney injury)     - Cr 2.0 on admit baseline 0.8. Likely pre-renal secondary to decreased fluid intake and sepsis.   - Improving to 1.4, continue with aggressive IVF and encouraging PO intake.   - Strict I/O and avoid nephrotoxic agents          Hypernatremia     - Na 164 on admit, likely secondary to dehydration in setting of decreased PO intake. Had a Total water deficit 3.5L.  - Improving with D5W: now 147 (down from 153 yesterday)  - Trending BMP Q12h.        Hypohemoglobinemia   Hb has dropped to 7.1 from   Add H2 receptor blocker due to possible GI losses  Monitor closely Q6 and consider possible transfusion     Diabetes mellitus     - On metformin 500mg BD at home. Last HbA1c 5.1% in 09/2017.  - Last glucose level was 269, likely elevated to D5W and T2DM.  -Withholding Metformin due to CHERI, added Insulin-Detemir 5U       Decubitus ulcer of buttock     - Wound care consulted, following their recs.        Acute encephalopathy     - Resolving, secondary to hypernatremia, septic encephalopathy, and advanced Alzheimer's dementia.  - Now at baseline.        Essential hypertension     - Continue home nifedipine 60mg Q24H.

## 2018-02-12 NOTE — ASSESSMENT & PLAN NOTE
- On metformin 500mg BD at home. Last HbA1c 5.1% in 09/2017.  - Some hyperglycemia considering D5W gtt - added levemir 5 U daily on 2/11 (increased to 7U on 2/12) and continue with MDSSI.

## 2018-02-12 NOTE — PLAN OF CARE
IM4 team and pt's family w/ family mtg today approx 1pm - family decided to take pt home w/ h/h and family providing assistance. CM provided family info on the Medicaid waiver program and Advanced Directives as pt is expected to be made a DNR. Forms for [place] also given to MD if pt's family wants to continue the DNR status into the community. Family has inquired/requested to have a hospital bed at d/c - CM called and left msg w/ [facility] CM relaying wound care notes and will have team order hospital bed for home - CM updated family that [facility] would have to auth the bed but all info will be sent.     02/12/18 6411   Discharge Reassessment   Assessment Type Discharge Planning Reassessment   Provided patient/caregiver education on the expected discharge date and the discharge plan Yes   Do you have any problems affording any of your prescribed medications? No   Discharge Plan A Home with family;Home Health   Discharge Plan B New Nursing Home placement - retirement care facility  (declined)   Patient choice form signed by patient/caregiver N/A   Can the patient answer the patient profile reliably? No, cognitively impaired   How does the patient rate their overall health at the present time? Fair   Describe the patient's ability to walk at the present time. Major restrictions/daily assistance from another person   How often would a person be available to care for the patient? Often   Number of comorbid conditions (as recorded on the chart) Three   During the past month, has the patient often been bothered by feeling down, depressed or hopeless? Yes   During the past month, has the patient often been bothered by little interest or pleasure in doing things? Yes

## 2018-02-12 NOTE — ASSESSMENT & PLAN NOTE
- Cr 2.0 on admit baseline 0.8. Likely pre-renal secondary to decreased fluid intake and sepsis.    - Improving to 1.4 (may be new baseline, last creatinine under 1 was September of last year) after IVF rescucitation  - Encouraging PO intake.   - Strict I/O and avoid nephrotoxic agents

## 2018-02-12 NOTE — TELEPHONE ENCOUNTER
Previous OVs stopped the metformin b/c of normal blood sugar.  Unfortunately pt hospitalized with E coli sepsis currently.

## 2018-02-12 NOTE — ASSESSMENT & PLAN NOTE
- Presented septic on admission - leukocytosis (WBC 15) and hypotension (90/50) with GNR bacteremia and UTI on UA with CHERI and altered mentation. CXR neg for PNA. Also risk of infection via decubitus wound.   - Fluid resuscitated Initial fluid rescucitation 30cc/kg-total 1.5L Ns. IV   - Initial blood culture & urine cultures with pan-sensitive (with exception of tetracycline) E. Coli. Repeat blood cultures 02/09 NGTD.  - S/p 3 days of IV ceftriaxone with transition to PO ciprofloxacin on 2/11 - end date 2/22.   - Did have febrile event at 101.4 at 2/10 but none since.  - Mentation back to baseline on 2/11.

## 2018-02-12 NOTE — PLAN OF CARE
Problem: Fall Risk (Adult)  Goal: Identify Related Risk Factors and Signs and Symptoms  Related risk factors and signs and symptoms are identified upon initiation of Human Response Clinical Practice Guideline (CPG)   Outcome: Ongoing (interventions implemented as appropriate)   02/11/18 1700   Fall Risk   Related Risk Factors (Fall Risk) age-related changes;bladder function altered;confusion/agitation;culprit medication(s);fatigue/slow reaction;gait/mobility problems;homeostatic imbalance;polypharmacy   Signs and Symptoms (Fall Risk) presence of risk factors     Goal: Absence of Falls  Patient will demonstrate the desired outcomes by discharge/transition of care.    02/11/18 1700   Fall Risk (Adult)   Absence of Falls making progress toward outcome

## 2018-02-12 NOTE — ASSESSMENT & PLAN NOTE
- Na 164  on admit, likely secondary to dehydration in setting of decreased PO intake.Total water deficit 3.5L.  - Improved with D5W gtt - now 147.  - Trending BMP q12h.

## 2018-02-12 NOTE — PROGRESS NOTES
Patient blood sugar of 379. Dr. Church notified and ordered to change accuchecks to q4. No acute distress at this time. Will continue to monitor.

## 2018-02-12 NOTE — PLAN OF CARE
Problem: Patient Care Overview  Goal: Plan of Care Review  Outcome: Ongoing (interventions implemented as appropriate)  Plan of care reviewed, patient verbalizes understanding. Patient oriented only to self, neuro checks q4. Patient denies pain and N/V throughout shift. Patient on a purred honey thick diet. On telemetry, NSR. Blood sugar checked q4, coverage given per MAR. Patient turned q2. Remains free of falls/injuries. No acute distress at this time. Daughter at the beside. Will continue to monitor.

## 2018-02-12 NOTE — SUBJECTIVE & OBJECTIVE
Interval History:   Patient more somnolent this morning but does speak simple words and follows basic commands. Daughter at bedside this morning and states without any acute events overnight. Patient on 2L nasal cannula. Without fevers or chills overnight.     Review of Systems   Unable to perform ROS: Dementia     Objective:     Vital Signs (Most Recent):  Temp: 98.5 °F (36.9 °C) (02/12/18 0755)  Pulse: 88 (02/12/18 1036)  Resp: 18 (02/12/18 0755)  BP: (!) 123/56 (02/12/18 0755)  SpO2: 98 % (02/12/18 0755) Vital Signs (24h Range):  Temp:  [98.2 °F (36.8 °C)-99.2 °F (37.3 °C)] 98.5 °F (36.9 °C)  Pulse:  [70-89] 88  Resp:  [16-21] 18  SpO2:  [88 %-99 %] 98 %  BP: (104-137)/(51-74) 123/56     Weight: 40.8 kg (89 lb 15.2 oz)  Body mass index is 17.57 kg/m².    Intake/Output Summary (Last 24 hours) at 02/12/18 1109  Last data filed at 02/12/18 0500   Gross per 24 hour   Intake             1170 ml   Output                0 ml   Net             1170 ml      Physical Exam   Constitutional: No distress.   cachectic   HENT:   Head: Normocephalic and atraumatic.   Eyes: Pupils are equal, round, and reactive to light.   Neck: Normal range of motion.   Cardiovascular: Normal rate, regular rhythm and normal heart sounds.    Pulmonary/Chest: Effort normal. No respiratory distress. She has no wheezes. She has rales (Faint bibasilar nspiratory crackles bilaterally ).   Abdominal: Soft. There is no tenderness.   Hypoactive bowel sounds   Neurological:   -More lethargic on exam, more difficult to arouse but still opens eyes to voice.   Skin: Skin is dry. No erythema.   -Hands cool; bilateral lower extremities warm       Significant Labs:   BMP:   Recent Labs  Lab 02/12/18  0345   *  269*   *  147*   K 3.4*  3.4*   *  117*   CO2 25  25   BUN 36*  36*   CREATININE 1.4  1.4   CALCIUM 7.7*  7.7*   MG 1.9     CBC:   Recent Labs  Lab 02/11/18  0539 02/12/18  0345 02/12/18  0803   WBC 15.30* 14.50* 13.93*   HGB  9.2* 6.9* 7.1*   HCT 32.2* 23.6* 24.8*    193 171

## 2018-02-12 NOTE — PROGRESS NOTES
[facility] Hospital Medicine  Progress Note    Patient Name: Delfina Pelletier  MRN: [id number]  Patient Class: IP- Inpatient   Admission Date: 2/8/2018  Length of Stay: 4 days  Attending Physician: Lion Ramírez MD  Primary Care Provider: Dimitri Castellanos MD    [facility] Medicine Team: [facility] 4 Vahe Downing MD    Subjective:     Principal Problem:Bacteremia due to Escherichia coli    HPI:  89 yr old female with PMHx dementia, HTN, HLD, DM 2 who presents to [facility] ED with chief complaint of fatigue. History obtained from family and chart review. Onset of fatigue yesterday morning. Family noted that patient was sleeping throughout the day, lethargic, and talking less. They assisted her back to bed but she was unable to get out of bed on her own and they decided to bring her to the ED. Associated with decreased PO intake the last few days. No fever, chills, chest pain, dyspnea, cough, vomiting, diarrhea, foul-smelling urine, hematuria, dysuria or distress noted. ROS positive for weight loss, declining mentation, constipation, and right buttock wound (appears worse than 4 wks ago, no blood, pus, or mal-odor noted.)    Patient admitted to observation in 08/2017 for dehydration with hypernatremia & UTI (viridians strep); around that time, patient eating less and losing weight. Patient also visited ED in 09/2017 for transient alteration in awareness; work-up negative and attributed to progressive dementia. History of UTIs with klebsiella, E. Coli, and viridians strep. Patient diagnosed with Alzheimer's disease 2 years ago; prior to that she had been socially active. Currently at baseline, patient is oriented only to self, she does not recognize daughter or sister (since 2 months ago.) She self-ambulates and self voids. Lives at home with daughter.    Hospital Course:  Patient presents to ED with chief complaints of lethargy and altered mental status. On admit, patient is septic (WBC 15,  hypotensive 90/50) with UTI on UA. Also hypernatremic (164), and CHERI with Cr 2.0 (baseline 1.0). In ED, patient is given 2g ceftriaxone and 1.5L NS. D5 drip started at rate of 50mL/hr. BMP ordered Q6H. On repeat BMPs, Na and CHERI slowly improving but K= 2.8 from 3.6. Given 80mEq IV potassium. Repeat BMP with improved potassium and Cr but Na elevated; increased rate of D5. Elevated Bg, transitioned to half-normal saline & D5 with addition of levemir.     Interval History:   Patient more somnolent this morning but does speak simple words and follows basic commands. Daughter at bedside this morning and states without any acute events overnight. Patient on 2L nasal cannula. Without fevers or chills overnight.     Review of Systems   Unable to perform ROS: Dementia     Objective:     Vital Signs (Most Recent):  Temp: 98.5 °F (36.9 °C) (02/12/18 0755)  Pulse: 88 (02/12/18 1036)  Resp: 18 (02/12/18 0755)  BP: (!) 123/56 (02/12/18 0755)  SpO2: 98 % (02/12/18 0755) Vital Signs (24h Range):  Temp:  [98.2 °F (36.8 °C)-99.2 °F (37.3 °C)] 98.5 °F (36.9 °C)  Pulse:  [70-89] 88  Resp:  [16-21] 18  SpO2:  [88 %-99 %] 98 %  BP: (104-137)/(51-74) 123/56     Weight: 40.8 kg (89 lb 15.2 oz)  Body mass index is 17.57 kg/m².    Intake/Output Summary (Last 24 hours) at 02/12/18 1109  Last data filed at 02/12/18 0500   Gross per 24 hour   Intake             1170 ml   Output                0 ml   Net             1170 ml      Physical Exam   Constitutional: No distress.   cachectic   HENT:   Head: Normocephalic and atraumatic.   Eyes: Pupils are equal, round, and reactive to light.   Neck: Normal range of motion.   Cardiovascular: Normal rate, regular rhythm and normal heart sounds.    Pulmonary/Chest: Effort normal. No respiratory distress. She has no wheezes. She has rales (Faint bibasilar nspiratory crackles bilaterally ).   Abdominal: Soft. There is no tenderness.   Hypoactive bowel sounds   Neurological:   -More lethargic on exam, more  difficult to arouse but still opens eyes to voice.   Skin: Skin is dry. No erythema.   -Hands cool; bilateral lower extremities warm       Significant Labs:   BMP:   Recent Labs  Lab 02/12/18  0345   *  269*   *  147*   K 3.4*  3.4*   *  117*   CO2 25  25   BUN 36*  36*   CREATININE 1.4  1.4   CALCIUM 7.7*  7.7*   MG 1.9     CBC:   Recent Labs  Lab 02/11/18  0539 02/12/18  0345 02/12/18  0803   WBC 15.30* 14.50* 13.93*   HGB 9.2* 6.9* 7.1*   HCT 32.2* 23.6* 24.8*    193 171         Assessment/Plan:      * Bacteremia due to Escherichia coli    - Presented septic on admission - leukocytosis (WBC 15) and hypotension (90/50) with GNR bacteremia and UTI on UA with CHERI and altered mentation. CXR neg for PNA. Also risk of infection via decubitus wound.   - Fluid resuscitated Initial fluid rescucitation 30cc/kg-total 1.5L Ns. IV   - Initial blood culture & urine cultures with pan-sensitive (with exception of tetracycline) E. Coli. Repeat blood cultures 02/09 NGTD.  - S/p 3 days of IV ceftriaxone with transition to PO ciprofloxacin on 2/11 - end date 2/22.   - Did have febrile event at 101.4 at 2/10 but none since.  - Mentation back to baseline on 2/11.             E. coli UTI    - UA with 3+ leuks, 40 WBC, many bacteria  - urine culture with E. Coli  - See bacteremia due to E. coli.           CHERI (acute kidney injury)    - Cr 2.0 on admit baseline 0.8. Likely pre-renal secondary to decreased fluid intake and sepsis.    - Improving to 1.4 (may be new baseline, last creatinine under 1 was September of last year) after IVF rescucitation    - Encouraging PO intake.   - Strict I/O and avoid nephrotoxic agents          Hypernatremia    - Na 164  on admit, likely secondary to dehydration in setting of decreased PO intake.Total water deficit 3.5L.  - Improved with D5W gtt - now 147.  - Trending BMP q12h.           History of diabetes mellitus    - On metformin 500mg BD at home. Last HbA1c 5.1% in  09/2017.  - Some hyperglycemia considering D5W gtt - added levemir 5 U daily on 2/11 (increased to 7U on 2/12) and continue with MDSSI.          Hypoxia    - Intermittent hypoxia at night, currently on 2L nasal cannula - f/u CXR.   - Possibly secondary to some pulmonary edema considering aggressive IVF here.   - Trial IV lasix.         Alteration in skin integrity              Decubitus ulcer of buttock, unstageable    - Wound care consulted, following their recs          Decubitus ulcer of right buttock, stage 2    - Wound care consulted, following their recs.         Acute encephalopathy    - Resolving, secondary to hypernatremia, septic encephalopathy, and advanced Alzheimer's dementia.  - Now at baseline.         Essential hypertension    - Continue home nifedipine 60mg Q24H.             VTE Risk Mitigation         Ordered     heparin (porcine) injection 5,000 Units  Every 8 hours     Route:  Subcutaneous        02/08/18 2247     Medium Risk of VTE  Once      02/08/18 1303     Place IGNACIA hose  Until discontinued      02/08/18 1107     Place sequential compression device  Until discontinued      02/08/18 1107        Disposition: Patient now on PO antibiotics to treat E.coli bacteremia until 2/22. Patient clinically well with exception of worsening anemia, likely secondary to GI bleed and dilutional anemia (2L bolus with infusion yesterday). Family meeting today for goals of care discussion - intend to discuss code status, hospice, possible need for artificial feeding.     Vahe Downing MD  PGY-2 Internal Medicine  [phone]    Department of Hospital Medicine   [facility]

## 2018-02-13 PROBLEM — J96.01 ACUTE RESPIRATORY FAILURE WITH HYPOXIA: Status: ACTIVE | Noted: 2018-02-11

## 2018-02-13 LAB
ANION GAP SERPL CALC-SCNC: 8 MMOL/L
ANION GAP SERPL CALC-SCNC: 9 MMOL/L
BACTERIA BLD CULT: NORMAL
BASOPHILS # BLD AUTO: 0.01 K/UL
BASOPHILS # BLD AUTO: 0.03 K/UL
BASOPHILS NFR BLD: 0.1 %
BASOPHILS NFR BLD: 0.2 %
BLD PROD TYP BPU: NORMAL
BLOOD UNIT EXPIRATION DATE: NORMAL
BLOOD UNIT TYPE CODE: 5100
BLOOD UNIT TYPE: NORMAL
BNP SERPL-MCNC: 128 PG/ML
BUN SERPL-MCNC: 35 MG/DL
BUN SERPL-MCNC: 38 MG/DL
CALCIUM SERPL-MCNC: 7.8 MG/DL
CALCIUM SERPL-MCNC: 8 MG/DL
CHLORIDE SERPL-SCNC: 113 MMOL/L
CHLORIDE SERPL-SCNC: 118 MMOL/L
CO2 SERPL-SCNC: 23 MMOL/L
CO2 SERPL-SCNC: 25 MMOL/L
CODING SYSTEM: NORMAL
CREAT SERPL-MCNC: 1.2 MG/DL
CREAT SERPL-MCNC: 1.3 MG/DL
DIFFERENTIAL METHOD: ABNORMAL
DIFFERENTIAL METHOD: ABNORMAL
DISPENSE STATUS: NORMAL
EOSINOPHIL # BLD AUTO: 0.1 K/UL
EOSINOPHIL # BLD AUTO: 0.1 K/UL
EOSINOPHIL NFR BLD: 0.9 %
EOSINOPHIL NFR BLD: 1 %
ERYTHROCYTE [DISTWIDTH] IN BLOOD BY AUTOMATED COUNT: 14 %
ERYTHROCYTE [DISTWIDTH] IN BLOOD BY AUTOMATED COUNT: 14.2 %
EST. GFR  (AFRICAN AMERICAN): 42 ML/MIN/1.73 M^2
EST. GFR  (AFRICAN AMERICAN): 46.3 ML/MIN/1.73 M^2
EST. GFR  (NON AFRICAN AMERICAN): 36.5 ML/MIN/1.73 M^2
EST. GFR  (NON AFRICAN AMERICAN): 40.2 ML/MIN/1.73 M^2
GLUCOSE SERPL-MCNC: 189 MG/DL
GLUCOSE SERPL-MCNC: 279 MG/DL
HCT VFR BLD AUTO: 22.8 %
HCT VFR BLD AUTO: 29.8 %
HGB BLD-MCNC: 6.7 G/DL
HGB BLD-MCNC: 9.2 G/DL
IMM GRANULOCYTES # BLD AUTO: 0.13 K/UL
IMM GRANULOCYTES # BLD AUTO: 0.14 K/UL
IMM GRANULOCYTES NFR BLD AUTO: 1.1 %
IMM GRANULOCYTES NFR BLD AUTO: 1.1 %
LYMPHOCYTES # BLD AUTO: 0.9 K/UL
LYMPHOCYTES # BLD AUTO: 1.3 K/UL
LYMPHOCYTES NFR BLD: 11.1 %
LYMPHOCYTES NFR BLD: 7 %
MAGNESIUM SERPL-MCNC: 2 MG/DL
MCH RBC QN AUTO: 27 PG
MCH RBC QN AUTO: 27.8 PG
MCHC RBC AUTO-ENTMCNC: 29.4 G/DL
MCHC RBC AUTO-ENTMCNC: 30.9 G/DL
MCV RBC AUTO: 90 FL
MCV RBC AUTO: 92 FL
MONOCYTES # BLD AUTO: 0.7 K/UL
MONOCYTES # BLD AUTO: 0.7 K/UL
MONOCYTES NFR BLD: 5.4 %
MONOCYTES NFR BLD: 6.1 %
NEUTROPHILS # BLD AUTO: 10.8 K/UL
NEUTROPHILS # BLD AUTO: 9.6 K/UL
NEUTROPHILS NFR BLD: 80.6 %
NEUTROPHILS NFR BLD: 85.4 %
NRBC BLD-RTO: 0 /100 WBC
NRBC BLD-RTO: 0 /100 WBC
PHOSPHATE SERPL-MCNC: 3.3 MG/DL
PLATELET # BLD AUTO: 210 K/UL
PLATELET # BLD AUTO: 233 K/UL
PMV BLD AUTO: 12.9 FL
PMV BLD AUTO: 13.3 FL
POCT GLUCOSE: 206 MG/DL (ref 70–110)
POCT GLUCOSE: 220 MG/DL (ref 70–110)
POCT GLUCOSE: 254 MG/DL (ref 70–110)
POCT GLUCOSE: 270 MG/DL (ref 70–110)
POCT GLUCOSE: 271 MG/DL (ref 70–110)
POTASSIUM SERPL-SCNC: 3.6 MMOL/L
POTASSIUM SERPL-SCNC: 4.5 MMOL/L
RBC # BLD AUTO: 2.48 M/UL
RBC # BLD AUTO: 3.31 M/UL
SODIUM SERPL-SCNC: 146 MMOL/L
SODIUM SERPL-SCNC: 150 MMOL/L
TRANS ERYTHROCYTES VOL PATIENT: NORMAL ML
WBC # BLD AUTO: 11.88 K/UL
WBC # BLD AUTO: 12.68 K/UL

## 2018-02-13 PROCEDURE — 85025 COMPLETE CBC W/AUTO DIFF WBC: CPT

## 2018-02-13 PROCEDURE — 80048 BASIC METABOLIC PNL TOTAL CA: CPT

## 2018-02-13 PROCEDURE — 83735 ASSAY OF MAGNESIUM: CPT

## 2018-02-13 PROCEDURE — 83880 ASSAY OF NATRIURETIC PEPTIDE: CPT

## 2018-02-13 PROCEDURE — 99233 SBSQ HOSP IP/OBS HIGH 50: CPT | Mod: GC,,, | Performed by: HOSPITALIST

## 2018-02-13 PROCEDURE — 36430 TRANSFUSION BLD/BLD COMPNT: CPT

## 2018-02-13 PROCEDURE — 25000003 PHARM REV CODE 250: Performed by: STUDENT IN AN ORGANIZED HEALTH CARE EDUCATION/TRAINING PROGRAM

## 2018-02-13 PROCEDURE — 36415 COLL VENOUS BLD VENIPUNCTURE: CPT

## 2018-02-13 PROCEDURE — P9021 RED BLOOD CELLS UNIT: HCPCS

## 2018-02-13 PROCEDURE — 84100 ASSAY OF PHOSPHORUS: CPT

## 2018-02-13 PROCEDURE — 11000001 HC ACUTE MED/SURG PRIVATE ROOM

## 2018-02-13 PROCEDURE — 63600175 PHARM REV CODE 636 W HCPCS: Performed by: STUDENT IN AN ORGANIZED HEALTH CARE EDUCATION/TRAINING PROGRAM

## 2018-02-13 RX ORDER — DEXTROSE MONOHYDRATE 50 MG/ML
INJECTION, SOLUTION INTRAVENOUS CONTINUOUS
Status: DISCONTINUED | OUTPATIENT
Start: 2018-02-13 | End: 2018-02-14

## 2018-02-13 RX ORDER — FUROSEMIDE 10 MG/ML
40 INJECTION INTRAMUSCULAR; INTRAVENOUS 2 TIMES DAILY
Status: DISCONTINUED | OUTPATIENT
Start: 2018-02-13 | End: 2018-02-14

## 2018-02-13 RX ORDER — HYDROCODONE BITARTRATE AND ACETAMINOPHEN 500; 5 MG/1; MG/1
TABLET ORAL
Status: DISCONTINUED | OUTPATIENT
Start: 2018-02-13 | End: 2018-02-14 | Stop reason: HOSPADM

## 2018-02-13 RX ORDER — FUROSEMIDE 10 MG/ML
40 INJECTION INTRAMUSCULAR; INTRAVENOUS 2 TIMES DAILY
Status: DISCONTINUED | OUTPATIENT
Start: 2018-02-13 | End: 2018-02-13

## 2018-02-13 RX ADMIN — HEPARIN SODIUM 5000 UNITS: 5000 INJECTION, SOLUTION INTRAVENOUS; SUBCUTANEOUS at 03:02

## 2018-02-13 RX ADMIN — FAMOTIDINE 20 MG: 20 TABLET, FILM COATED ORAL at 08:02

## 2018-02-13 RX ADMIN — INSULIN ASPART 6 UNITS: 100 INJECTION, SOLUTION INTRAVENOUS; SUBCUTANEOUS at 05:02

## 2018-02-13 RX ADMIN — POLYETHYLENE GLYCOL 3350 17 G: 17 POWDER, FOR SOLUTION ORAL at 08:02

## 2018-02-13 RX ADMIN — INSULIN ASPART 4 UNITS: 100 INJECTION, SOLUTION INTRAVENOUS; SUBCUTANEOUS at 05:02

## 2018-02-13 RX ADMIN — DEXTROSE: 5 SOLUTION INTRAVENOUS at 05:02

## 2018-02-13 RX ADMIN — DONEPEZIL HYDROCHLORIDE 10 MG: 10 TABLET ORAL at 08:02

## 2018-02-13 RX ADMIN — FUROSEMIDE 40 MG: 10 INJECTION, SOLUTION INTRAMUSCULAR; INTRAVENOUS at 05:02

## 2018-02-13 RX ADMIN — HEPARIN SODIUM 5000 UNITS: 5000 INJECTION, SOLUTION INTRAVENOUS; SUBCUTANEOUS at 05:02

## 2018-02-13 RX ADMIN — HEPARIN SODIUM 5000 UNITS: 5000 INJECTION, SOLUTION INTRAVENOUS; SUBCUTANEOUS at 09:02

## 2018-02-13 RX ADMIN — ROSUVASTATIN CALCIUM 10 MG: 10 TABLET, FILM COATED ORAL at 08:02

## 2018-02-13 RX ADMIN — INSULIN DETEMIR 3 UNITS: 100 INJECTION, SOLUTION SUBCUTANEOUS at 12:02

## 2018-02-13 RX ADMIN — INSULIN ASPART 4 UNITS: 100 INJECTION, SOLUTION INTRAVENOUS; SUBCUTANEOUS at 07:02

## 2018-02-13 RX ADMIN — INSULIN ASPART 3 UNITS: 100 INJECTION, SOLUTION INTRAVENOUS; SUBCUTANEOUS at 11:02

## 2018-02-13 RX ADMIN — NIFEDIPINE 60 MG: 60 TABLET, FILM COATED, EXTENDED RELEASE ORAL at 08:02

## 2018-02-13 RX ADMIN — CIPROFLOXACIN HYDROCHLORIDE 500 MG: 250 TABLET, FILM COATED ORAL at 08:02

## 2018-02-13 RX ADMIN — INSULIN ASPART 6 UNITS: 100 INJECTION, SOLUTION INTRAVENOUS; SUBCUTANEOUS at 12:02

## 2018-02-13 NOTE — ASSESSMENT & PLAN NOTE
- Na 164  on admit, likely secondary to dehydration in setting of decreased PO intake.Total water deficit 3.5L.  - Improved with D5W gtt - now 150 (but worsened from 147 yesterday) - continued gtt at 75 ml/hr  - Trending BMP q12h.

## 2018-02-13 NOTE — ASSESSMENT & PLAN NOTE
- Presented septic on admission - leukocytosis (WBC 15) and hypotension (90/50) with GNR bacteremia and UTI on UA with CHERI and altered mentation. CXR neg for PNA. Also risk of infection via decubitus wound.   - Fluid resuscitated Initial fluid rescucitation 30cc/kg-total 1.5L Ns. IV   - Initial blood culture & urine cultures with pan-sensitive (with exception of tetracycline) E. Coli. Repeat blood cultures 02/09 NGTD.  - S/p 3 days of IV ceftriaxone with transition to PO ciprofloxacin on 2/11 - end date 2/23.   - Did have febrile event at 101.4 at 2/10 but none since.  - Mentation back to baseline on 2/11.

## 2018-02-13 NOTE — ASSESSMENT & PLAN NOTE
- Hemoglobin drop from 9.2 to 7.1 from 2/11 to 2/12  - likely secondary to dilutional anemia (received 2L bolus and IV infusions) with GI bleed - reports of BM by daughter but not dark in appearance.  - Added H2 blocker and ordered FOBT stool.  - Monitor CBC twice daily; patient type and screened and blood consent will be attained.  - Transfused with Hgb<7 - 1U PRBC on 2/13 for hemoglobin of 6.7.

## 2018-02-13 NOTE — NURSING
Awake to voice.  Vital signs stable.  Afebrile.  No complaints of chest pain or shortness of breath.  Boots cora lower extremities, removed and lower extremities inspected.  Abd soft.  Small bowel movement, soft and brownish in color.  Turned every 2 hours since she has a skin tear to the coccyx.  HOB elevated.  No complaitns of pain or discomfort this shift.

## 2018-02-13 NOTE — ASSESSMENT & PLAN NOTE
- On metformin 500mg BD at home. Last HbA1c 5.1% in 09/2017.  - Some hyperglycemia considering D5W gtt - added levemir 10U daily on 2/13 (increased to 7U on 2/12) and continue with MDSSI.

## 2018-02-13 NOTE — PROGRESS NOTES
[facility] Hospital Medicine  Progress Note    Patient Name: Delfina Pelletier  MRN: [id number]  Patient Class: IP- Inpatient   Admission Date: 2/8/2018  Length of Stay: 5 days  Attending Physician: Lion Ramírez MD  Primary Care Provider: Dimitri Castellanos MD    [facility] Medicine Team: [facility] HOSP MED 4 Vahe Downing MD    Subjective:     Principal Problem:Bacteremia due to Escherichia coli    HPI:  89 yr old female with PMHx dementia, HTN, HLD, DM 2 who presents to [facility] with chief complaint of fatigue. History obtained from family and chart review. Onset of fatigue yesterday morning. Family noted that patient was sleeping throughout the day, lethargic, and talking less. They assisted her back to bed but she was unable to get out of bed on her own and they decided to bring her to the ED. Associated with decreased PO intake the last few days. No fever, chills, chest pain, dyspnea, cough, vomiting, diarrhea, foul-smelling urine, hematuria, dysuria or distress noted. ROS positive for weight loss, declining mentation, constipation, and right buttock wound (appears worse than 4 wks ago, no blood, pus, or mal-odor noted.)    Patient admitted to observation in 08/2017 for dehydration with hypernatremia & UTI (viridians strep); around that time, patient eating less and losing weight. Patient also visited ED in 09/2017 for transient alteration in awareness; work-up negative and attributed to progressive dementia. History of UTIs with klebsiella, E. Coli, and viridians strep. Patient diagnosed with Alzheimer's disease 2 years ago; prior to that she had been socially active. Currently at baseline, patient is oriented only to self, she does not recognize daughter or sister (since 2 months ago.) She self-ambulates and self voids. Lives at home with daughter.    Hospital Course:  Patient presents to ED with chief complaints of lethargy and altered mental status. On admit, patient is septic (WBC 15,  hypotensive 90/50) with UTI on UA. Also hypernatremic (164), and CHERI with Cr 2.0 (baseline 1.0). Patient was given sepsis bolus and started on ceftriaxone for treatment of UTI. Patient was admitted to medicine team 4 and started on D5W for treatment of hypernatremia. Patient's blood culture and urine cultures from 2/8 were positive for E. Coli and IV ceftriaxone was changed to PO ciprofloxacin on 2/11 and patient has tolerated well. Family meeting on 2/13 which family wishes for patient to be discharged home on home health. Patient's code made DNR.     Interval History:   No acute events overnight. Reported with small brown BM overnight. Patient is awake and talkative this morning. Denies any pain or shortness of breath. Still on 2L nasal cannula. Without fevers or chills, nausea, vomiting, abdominal pain overnight.     Review of Systems   Unable to perform ROS: Dementia     Objective:     Vital Signs (Most Recent):  Temp: (P) 98.3 °F (36.8 °C) (02/13/18 1252)  Pulse: (P) 94 (02/13/18 1252)  Resp: (P) 18 (02/13/18 1252)  BP: (!) (P) 116/54 (02/13/18 1252)  SpO2: (!) (P) 94 % (02/13/18 1252) Vital Signs (24h Range):  Temp:  [98.1 °F (36.7 °C)-98.9 °F (37.2 °C)] (P) 98.3 °F (36.8 °C)  Pulse:  [65-96] (P) 94  Resp:  [16-20] (P) 18  SpO2:  [91 %-100 %] (P) 94 %  BP: (114-144)/(54-64) (P) 116/54     Weight: 40.8 kg (89 lb 15.2 oz)  Body mass index is 17.57 kg/m².    Intake/Output Summary (Last 24 hours) at 02/13/18 1306  Last data filed at 02/12/18 1800   Gross per 24 hour   Intake              480 ml   Output                0 ml   Net              480 ml      Physical Exam   Constitutional: No distress.   cachectic   HENT:   Head: Normocephalic and atraumatic.   Eyes: Pupils are equal, round, and reactive to light.   Neck: Normal range of motion.   Cardiovascular: Normal rate, regular rhythm and normal heart sounds.    Pulmonary/Chest: Effort normal. No respiratory distress. She has no wheezes. She has rales (Faint  bibasilar nspiratory crackles bilaterally ).   Abdominal: Soft. There is no tenderness.   Hypoactive bowel sounds   Musculoskeletal: She exhibits edema (1+ non-pitting UE edema.).   Neurological:   -More lethargic on exam, more difficult to arouse but still opens eyes to voice.   Skin: Skin is dry. No erythema.   -Hands cool; bilateral lower extremities warm       Significant Labs:   BMP:   Recent Labs  Lab 02/13/18  0552   *   *   K 4.5   *   CO2 23   BUN 38*   CREATININE 1.3   CALCIUM 7.8*   MG 2.0     CBC:   Recent Labs  Lab 02/12/18  0803 02/12/18  1816 02/13/18  0853   WBC 13.93* 13.91* 11.88   HGB 7.1* 7.1* 6.7*   HCT 24.8* 23.6* 22.8*    201 210     Assessment/Plan:      * Bacteremia due to Escherichia coli    - Presented septic on admission - leukocytosis (WBC 15) and hypotension (90/50) with GNR bacteremia and UTI on UA with CHERI and altered mentation. CXR neg for PNA. Also risk of infection via decubitus wound.   - Fluid resuscitated Initial fluid rescucitation 30cc/kg-total 1.5L Ns. IV   - Initial blood culture & urine cultures with pan-sensitive (with exception of tetracycline) E. Coli. Repeat blood cultures 02/09 NGTD.  - S/p 3 days of IV ceftriaxone with transition to PO ciprofloxacin on 2/11 - end date 2/23.   - Did have febrile event at 101.4 at 2/10 but none since.  - Mentation back to baseline on 2/11.             E. coli UTI    - UA with 3+ leuks, 40 WBC, many bacteria  - urine culture with E. Coli  - See bacteremia due to E. coli.           Acute respiratory failure with hypoxia    - Currently on 2L nasal cannula - CXR consistent with pulmonary edema - likely from aggressive IVF here  - IV furosemide increased to 40 mg BID.   - Wean O2 as appropriate.         CHERI (acute kidney injury)    - Cr 2.0 on admit baseline 0.8. Likely pre-renal secondary to decreased fluid intake and sepsis.    - Improving to 1.3 (may be new baseline, last creatinine under 1 was September of last  year) after IVF rescucitation  - Encouraging PO intake.   - Strict I/O and avoid nephrotoxic agents          Normocytic anemia due to blood loss    - Hemoglobin drop from 9.2 to 7.1 from 2/11 to 2/12  - likely secondary to dilutional anemia (received 2L bolus and IV infusions) with GI bleed - reports of BM by daughter but not dark in appearance.  - Added H2 blocker and ordered FOBT stool.  - Monitor CBC twice daily; patient type and screened and blood consent will be attained.  - Transfused with Hgb<7 - 1U PRBC on 2/13 for hemoglobin of 6.7.           Hypernatremia    - Na 164  on admit, likely secondary to dehydration in setting of decreased PO intake.Total water deficit 3.5L.  - Improved with D5W gtt - now 150 (but worsened from 147 yesterday) - continued gtt at 75 ml/hr  - Trending BMP q12h.           History of diabetes mellitus    - On metformin 500mg BD at home. Last HbA1c 5.1% in 09/2017.  - Some hyperglycemia considering D5W gtt - added levemir 10U daily on 2/13 (increased to 7U on 2/12) and continue with MDSSI.          Alteration in skin integrity              Decubitus ulcer of buttock, unstageable    - Wound care consulted, following their recs          Decubitus ulcer of right buttock, stage 2    - Wound care consulted, following their recs.         Acute encephalopathy    - Resolving, secondary to hypernatremia, septic encephalopathy, and advanced Alzheimer's dementia.  - Now at baseline.         Essential hypertension    - Continue home nifedipine 60mg Q24H.             VTE Risk Mitigation         Ordered     heparin (porcine) injection 5,000 Units  Every 8 hours     Route:  Subcutaneous        02/08/18 2247     Medium Risk of VTE  Once      02/08/18 1303     Place IGNACIA hose  Until discontinued      02/08/18 1107     Place sequential compression device  Until discontinued      02/08/18 1107        Disposition: Family discussion yesterday in which they wish to take patient home with home hospice.  Patient made DNR.    Active issues   - hypernatremia - continue d5w gtt (increased levemir as well for hyperglycemia)  - LE and UE edema with pulmonary edema - lasix IV BID  - E.coli bacteremia - continue po ciprofloxacin.    Vahe Downing MD  PGY-2 Internal Medicine  [phone]    Department of Hospital Medicine   [facility]

## 2018-02-13 NOTE — ASSESSMENT & PLAN NOTE
- Cr 2.0 on admit baseline 0.8. Likely pre-renal secondary to decreased fluid intake and sepsis.    - Improving to 1.3 (may be new baseline, last creatinine under 1 was September of last year) after IVF rescucitation  - Encouraging PO intake.   - Strict I/O and avoid nephrotoxic agents

## 2018-02-13 NOTE — ASSESSMENT & PLAN NOTE
- Currently on 2L nasal cannula - CXR consistent with pulmonary edema - likely from aggressive IVF here  - IV furosemide increased to 40 mg BID.   - Wean O2 as appropriate.

## 2018-02-13 NOTE — SUBJECTIVE & OBJECTIVE
Interval History:   No acute events overnight. Reported with small brown BM overnight. Patient is awake and talkative this morning. Denies any pain or shortness of breath. Still on 2L nasal cannula. Without fevers or chills, nausea, vomiting, abdominal pain overnight.     Review of Systems   Unable to perform ROS: Dementia     Objective:     Vital Signs (Most Recent):  Temp: (P) 98.3 °F (36.8 °C) (02/13/18 1252)  Pulse: (P) 94 (02/13/18 1252)  Resp: (P) 18 (02/13/18 1252)  BP: (!) (P) 116/54 (02/13/18 1252)  SpO2: (!) (P) 94 % (02/13/18 1252) Vital Signs (24h Range):  Temp:  [98.1 °F (36.7 °C)-98.9 °F (37.2 °C)] (P) 98.3 °F (36.8 °C)  Pulse:  [65-96] (P) 94  Resp:  [16-20] (P) 18  SpO2:  [91 %-100 %] (P) 94 %  BP: (114-144)/(54-64) (P) 116/54     Weight: 40.8 kg (89 lb 15.2 oz)  Body mass index is 17.57 kg/m².    Intake/Output Summary (Last 24 hours) at 02/13/18 1306  Last data filed at 02/12/18 1800   Gross per 24 hour   Intake              480 ml   Output                0 ml   Net              480 ml      Physical Exam   Constitutional: No distress.   cachectic   HENT:   Head: Normocephalic and atraumatic.   Eyes: Pupils are equal, round, and reactive to light.   Neck: Normal range of motion.   Cardiovascular: Normal rate, regular rhythm and normal heart sounds.    Pulmonary/Chest: Effort normal. No respiratory distress. She has no wheezes. She has rales (Faint bibasilar nspiratory crackles bilaterally ).   Abdominal: Soft. There is no tenderness.   Hypoactive bowel sounds   Musculoskeletal: She exhibits edema (1+ non-pitting UE edema.).   Neurological:   -More lethargic on exam, more difficult to arouse but still opens eyes to voice.   Skin: Skin is dry. No erythema.   -Hands cool; bilateral lower extremities warm       Significant Labs:   BMP:   Recent Labs  Lab 02/13/18  0552   *   *   K 4.5   *   CO2 23   BUN 38*   CREATININE 1.3   CALCIUM 7.8*   MG 2.0     CBC:   Recent Labs  Lab  02/12/18  0803 02/12/18  1816 02/13/18  0853   WBC 13.93* 13.91* 11.88   HGB 7.1* 7.1* 6.7*   HCT 24.8* 23.6* 22.8*    201 210

## 2018-02-14 VITALS
TEMPERATURE: 98 F | RESPIRATION RATE: 16 BRPM | DIASTOLIC BLOOD PRESSURE: 55 MMHG | OXYGEN SATURATION: 93 % | HEART RATE: 83 BPM | BODY MASS INDEX: 17.66 KG/M2 | SYSTOLIC BLOOD PRESSURE: 119 MMHG | WEIGHT: 89.94 LBS | HEIGHT: 60 IN

## 2018-02-14 PROBLEM — E43 SEVERE PROTEIN-CALORIE MALNUTRITION: Status: ACTIVE | Noted: 2018-02-14

## 2018-02-14 LAB
ANION GAP SERPL CALC-SCNC: 10 MMOL/L
ANION GAP SERPL CALC-SCNC: 6 MMOL/L
BACTERIA BLD CULT: NORMAL
BACTERIA BLD CULT: NORMAL
BASOPHILS # BLD AUTO: 0.02 K/UL
BASOPHILS # BLD AUTO: 0.02 K/UL
BASOPHILS NFR BLD: 0.2 %
BASOPHILS NFR BLD: 0.2 %
BUN SERPL-MCNC: 32 MG/DL
BUN SERPL-MCNC: 33 MG/DL
CALCIUM SERPL-MCNC: 8 MG/DL
CALCIUM SERPL-MCNC: 8.1 MG/DL
CHLORIDE SERPL-SCNC: 100 MMOL/L
CHLORIDE SERPL-SCNC: 106 MMOL/L
CO2 SERPL-SCNC: 30 MMOL/L
CO2 SERPL-SCNC: 32 MMOL/L
CREAT SERPL-MCNC: 1.2 MG/DL
CREAT SERPL-MCNC: 1.3 MG/DL
DIFFERENTIAL METHOD: ABNORMAL
DIFFERENTIAL METHOD: ABNORMAL
EOSINOPHIL # BLD AUTO: 0.1 K/UL
EOSINOPHIL # BLD AUTO: 0.1 K/UL
EOSINOPHIL NFR BLD: 0.8 %
EOSINOPHIL NFR BLD: 0.9 %
ERYTHROCYTE [DISTWIDTH] IN BLOOD BY AUTOMATED COUNT: 14 %
ERYTHROCYTE [DISTWIDTH] IN BLOOD BY AUTOMATED COUNT: 14.2 %
EST. GFR  (AFRICAN AMERICAN): 42 ML/MIN/1.73 M^2
EST. GFR  (AFRICAN AMERICAN): 46.3 ML/MIN/1.73 M^2
EST. GFR  (NON AFRICAN AMERICAN): 36.5 ML/MIN/1.73 M^2
EST. GFR  (NON AFRICAN AMERICAN): 40.2 ML/MIN/1.73 M^2
GLUCOSE SERPL-MCNC: 175 MG/DL
GLUCOSE SERPL-MCNC: 284 MG/DL
HCT VFR BLD AUTO: 28.2 %
HCT VFR BLD AUTO: 28.3 %
HGB BLD-MCNC: 8.7 G/DL
HGB BLD-MCNC: 8.8 G/DL
IMM GRANULOCYTES # BLD AUTO: 0.12 K/UL
IMM GRANULOCYTES # BLD AUTO: 0.14 K/UL
IMM GRANULOCYTES NFR BLD AUTO: 1 %
IMM GRANULOCYTES NFR BLD AUTO: 1.2 %
LYMPHOCYTES # BLD AUTO: 1.4 K/UL
LYMPHOCYTES # BLD AUTO: 1.5 K/UL
LYMPHOCYTES NFR BLD: 11.2 %
LYMPHOCYTES NFR BLD: 12.6 %
MAGNESIUM SERPL-MCNC: 1.8 MG/DL
MCH RBC QN AUTO: 27.7 PG
MCH RBC QN AUTO: 27.9 PG
MCHC RBC AUTO-ENTMCNC: 30.7 G/DL
MCHC RBC AUTO-ENTMCNC: 31.2 G/DL
MCV RBC AUTO: 89 FL
MCV RBC AUTO: 91 FL
MONOCYTES # BLD AUTO: 0.9 K/UL
MONOCYTES # BLD AUTO: 1 K/UL
MONOCYTES NFR BLD: 7.6 %
MONOCYTES NFR BLD: 7.8 %
NEUTROPHILS # BLD AUTO: 9.3 K/UL
NEUTROPHILS # BLD AUTO: 9.7 K/UL
NEUTROPHILS NFR BLD: 77.5 %
NEUTROPHILS NFR BLD: 79 %
NRBC BLD-RTO: 0 /100 WBC
NRBC BLD-RTO: 0 /100 WBC
PHOSPHATE SERPL-MCNC: 2.6 MG/DL
PLATELET # BLD AUTO: 240 K/UL
PLATELET # BLD AUTO: 243 K/UL
PMV BLD AUTO: 12.5 FL
PMV BLD AUTO: 13.1 FL
POCT GLUCOSE: 167 MG/DL (ref 70–110)
POCT GLUCOSE: 183 MG/DL (ref 70–110)
POCT GLUCOSE: 249 MG/DL (ref 70–110)
POTASSIUM SERPL-SCNC: 2.8 MMOL/L
POTASSIUM SERPL-SCNC: 3.9 MMOL/L
RBC # BLD AUTO: 3.12 M/UL
RBC # BLD AUTO: 3.18 M/UL
SODIUM SERPL-SCNC: 138 MMOL/L
SODIUM SERPL-SCNC: 146 MMOL/L
WBC # BLD AUTO: 11.95 K/UL
WBC # BLD AUTO: 12.3 K/UL

## 2018-02-14 PROCEDURE — 93005 ELECTROCARDIOGRAM TRACING: CPT

## 2018-02-14 PROCEDURE — 25000003 PHARM REV CODE 250: Performed by: STUDENT IN AN ORGANIZED HEALTH CARE EDUCATION/TRAINING PROGRAM

## 2018-02-14 PROCEDURE — 63600175 PHARM REV CODE 636 W HCPCS: Performed by: STUDENT IN AN ORGANIZED HEALTH CARE EDUCATION/TRAINING PROGRAM

## 2018-02-14 PROCEDURE — 99239 HOSP IP/OBS DSCHRG MGMT >30: CPT | Mod: GC,,, | Performed by: HOSPITALIST

## 2018-02-14 PROCEDURE — 84100 ASSAY OF PHOSPHORUS: CPT

## 2018-02-14 PROCEDURE — 85025 COMPLETE CBC W/AUTO DIFF WBC: CPT | Mod: 91

## 2018-02-14 PROCEDURE — 93010 ELECTROCARDIOGRAM REPORT: CPT | Mod: ,,, | Performed by: INTERNAL MEDICINE

## 2018-02-14 PROCEDURE — 80048 BASIC METABOLIC PNL TOTAL CA: CPT | Mod: 91

## 2018-02-14 PROCEDURE — 92526 ORAL FUNCTION THERAPY: CPT

## 2018-02-14 PROCEDURE — 36415 COLL VENOUS BLD VENIPUNCTURE: CPT

## 2018-02-14 PROCEDURE — 83735 ASSAY OF MAGNESIUM: CPT

## 2018-02-14 RX ORDER — AMOXICILLIN 250 MG
1 CAPSULE ORAL DAILY PRN
Status: DISCONTINUED | OUTPATIENT
Start: 2018-02-14 | End: 2018-02-14 | Stop reason: HOSPADM

## 2018-02-14 RX ORDER — FUROSEMIDE 10 MG/ML
40 INJECTION INTRAMUSCULAR; INTRAVENOUS ONCE
Status: COMPLETED | OUTPATIENT
Start: 2018-02-14 | End: 2018-02-14

## 2018-02-14 RX ORDER — CIPROFLOXACIN 500 MG/1
500 TABLET ORAL DAILY
Qty: 9 TABLET | Refills: 0 | Status: ON HOLD | OUTPATIENT
Start: 2018-02-14 | End: 2018-02-22 | Stop reason: HOSPADM

## 2018-02-14 RX ORDER — METFORMIN HYDROCHLORIDE 500 MG/1
500 TABLET ORAL 2 TIMES DAILY WITH MEALS
Qty: 180 TABLET | Refills: 3
Start: 2018-02-14 | End: 2019-02-14

## 2018-02-14 RX ORDER — MIRTAZAPINE 7.5 MG/1
7.5 TABLET, FILM COATED ORAL NIGHTLY
Status: DISCONTINUED | OUTPATIENT
Start: 2018-02-14 | End: 2018-02-14 | Stop reason: HOSPADM

## 2018-02-14 RX ORDER — MIRTAZAPINE 7.5 MG/1
7.5 TABLET, FILM COATED ORAL NIGHTLY
Qty: 60 TABLET | Refills: 4 | Status: SHIPPED | OUTPATIENT
Start: 2018-02-14 | End: 2019-02-14

## 2018-02-14 RX ADMIN — NIFEDIPINE 60 MG: 60 TABLET, FILM COATED, EXTENDED RELEASE ORAL at 09:02

## 2018-02-14 RX ADMIN — FAMOTIDINE 20 MG: 20 TABLET, FILM COATED ORAL at 09:02

## 2018-02-14 RX ADMIN — DOCUSATE SODIUM 250 MG: 50 CAPSULE, LIQUID FILLED ORAL at 09:02

## 2018-02-14 RX ADMIN — DEXTROSE: 5 SOLUTION INTRAVENOUS at 12:02

## 2018-02-14 RX ADMIN — POLYETHYLENE GLYCOL 3350 17 G: 17 POWDER, FOR SOLUTION ORAL at 09:02

## 2018-02-14 RX ADMIN — FUROSEMIDE 40 MG: 10 INJECTION, SOLUTION INTRAMUSCULAR; INTRAVENOUS at 09:02

## 2018-02-14 RX ADMIN — POTASSIUM BICARBONATE 50 MEQ: 25 TABLET, EFFERVESCENT ORAL at 02:02

## 2018-02-14 RX ADMIN — HEPARIN SODIUM 5000 UNITS: 5000 INJECTION, SOLUTION INTRAVENOUS; SUBCUTANEOUS at 02:02

## 2018-02-14 RX ADMIN — CIPROFLOXACIN HYDROCHLORIDE 500 MG: 250 TABLET, FILM COATED ORAL at 09:02

## 2018-02-14 RX ADMIN — ROSUVASTATIN CALCIUM 10 MG: 10 TABLET, FILM COATED ORAL at 09:02

## 2018-02-14 RX ADMIN — POTASSIUM BICARBONATE 50 MEQ: 25 TABLET, EFFERVESCENT ORAL at 09:02

## 2018-02-14 RX ADMIN — INSULIN ASPART 4 UNITS: 100 INJECTION, SOLUTION INTRAVENOUS; SUBCUTANEOUS at 01:02

## 2018-02-14 RX ADMIN — INSULIN ASPART 2 UNITS: 100 INJECTION, SOLUTION INTRAVENOUS; SUBCUTANEOUS at 05:02

## 2018-02-14 RX ADMIN — HEPARIN SODIUM 5000 UNITS: 5000 INJECTION, SOLUTION INTRAVENOUS; SUBCUTANEOUS at 05:02

## 2018-02-14 RX ADMIN — INSULIN ASPART 1 UNITS: 100 INJECTION, SOLUTION INTRAVENOUS; SUBCUTANEOUS at 10:02

## 2018-02-14 RX ADMIN — DEXTROSE: 5 SOLUTION INTRAVENOUS at 06:02

## 2018-02-14 RX ADMIN — INSULIN ASPART 10 UNITS: 100 INJECTION, SOLUTION INTRAVENOUS; SUBCUTANEOUS at 09:02

## 2018-02-14 RX ADMIN — DONEPEZIL HYDROCHLORIDE 10 MG: 10 TABLET ORAL at 09:02

## 2018-02-14 NOTE — ASSESSMENT & PLAN NOTE
- Na 164  on admit, likely secondary to dehydration in setting of decreased PO intake.Total water deficit at time 3.5L.  - Improved with D5W gtt - improved to 146 today continued gtt at 75 ml/hr  - Trending BMP q12h.

## 2018-02-14 NOTE — PT/OT/SLP PROGRESS
Speech Language Pathology Treatment    Patient Name:  Delfina Pelletier   MRN:  [id number]  Admitting Diagnosis: Bacteremia due to Escherichia coli    Recommendations:                 General Recommendations:  Dysphagia therapy  Diet recommendations:  Dental Soft, Liquid Diet Level: Thin   Aspiration Precautions: 1 bite/sip at a time, Feed only when awake/alert, HOB to 90 degrees and Small bites/sips   General Precautions: Standard, aspiration      Subjective     Awake/alert  Daughter at bedside    Pain/Comfort:  · Pain Rating 1: 0/10  · Pain Rating Post-Intervention 1: 0/10    Objective:     Has the patient been evaluated by SLP for swallowing?   Yes  Keep patient NPO? No   Current Respiratory Status:        Pt repositioned upright in bed for PO trials. Thin liquids tolerated via consecutive and single cup sips x10 with timely swallow initiation and no overt s/s of aspiration. Pt tolerated cracker x2 and puree x3 with timely mastication and adequate oral clearance. No overt s/s of airway compromise noted. Recommend dental soft diet/thin liquids at this time. SLP reviewed swallow precautions with pt and daughter.     Assessment:     Delfina Pelletier is a 89 y.o. female with an SLP diagnosis of Dysphagia.      Goals:    SLP Goals        Problem: SLP Goal    Goal Priority Disciplines Outcome   SLP Goal     SLP    Description:  Speech Language Pathology Goals  Goals expected to be met by 2/21    1. Pt will tolerate dental soft diet/thin liquids without overt s/s of aspiration                        Plan:     · Patient to be seen:  3 x/week   · Plan of Care expires:  03/10/18  · Plan of Care reviewed with:  patient, daughter   · SLP Follow-Up:  Yes       Discharge recommendations:    likely no ST post d/c      Time Tracking:     SLP Treatment Date:   02/14/18  Speech Start Time:  0801  Speech Stop Time:  0809     Speech Total Time (min):  8 min    Billable Minutes: Treatment Swallowing Dysfunction 8    Chelsea Stevenson  CCC-SLP  02/14/2018

## 2018-02-14 NOTE — PLAN OF CARE
[facility] 6pm , [id number], after hours [phone].    Pamella Treadwell, LCSW [id number]

## 2018-02-14 NOTE — PROGRESS NOTES
[facility] Hospital Medicine  Progress Note    Patient Name: Delfina Pelletier  MRN: [id number]  Patient Class: IP- Inpatient   Admission Date: 2/8/2018  Length of Stay: 6 days  Attending Physician: Lion Ramírez MD  Primary Care Provider: Dimitri Castellanos MD    [facility] Medicine Team: [facility] MED 4 Vahe Downing MD    Subjective:     Principal Problem:Bacteremia due to Escherichia coli    HPI:  89 yr old female with PMHx dementia, HTN, HLD, DM 2 who presents to [facility] ED with chief complaint of fatigue. History obtained from family and chart review. Onset of fatigue yesterday morning. Family noted that patient was sleeping throughout the day, lethargic, and talking less. They assisted her back to bed but she was unable to get out of bed on her own and they decided to bring her to the ED. Associated with decreased PO intake the last few days. No fever, chills, chest pain, dyspnea, cough, vomiting, diarrhea, foul-smelling urine, hematuria, dysuria or distress noted. ROS positive for weight loss, declining mentation, constipation, and right buttock wound (appears worse than 4 wks ago, no blood, pus, or mal-odor noted.)    Patient admitted to observation in 08/2017 for dehydration with hypernatremia & UTI (viridians strep); around that time, patient eating less and losing weight. Patient also visited ED in 09/2017 for transient alteration in awareness; work-up negative and attributed to progressive dementia. History of UTIs with klebsiella, E. Coli, and viridians strep. Patient diagnosed with Alzheimer's disease 2 years ago; prior to that she had been socially active. Currently at baseline, patient is oriented only to self, she does not recognize daughter or sister (since 2 months ago.) She self-ambulates and self voids. Lives at home with daughter.    Hospital Course:  Patient presents to ED with chief complaints of lethargy and altered mental status. On admit, patient is septic (WBC 15,  hypotensive 90/50) with UTI on UA. Also hypernatremic (164), and CHERI with Cr 2.0 (baseline 1.0). Patient was given sepsis bolus and started on ceftriaxone for treatment of UTI. Patient was admitted to medicine team 4 and started on D5W for treatment of hypernatremia. Patient's blood culture and urine cultures from 2/8 were positive for E. Coli and IV ceftriaxone was changed to PO ciprofloxacin on 2/11 and patient has tolerated well. Family meeting on 2/13 which family wishes for patient to be discharged home on home health. Patient's code made DNR.     Interval History:   No acute events overnight. Patient tolerated 1U prbc yesterday without complication and patient is more alert on visit. Still on 2L nasal cannula but denies shortness of breath. UOP undetermined as she soaks pads. Without recorded fevers. Patient denies shortness of breath or pain this morning.     Review of Systems   Unable to perform ROS: Dementia     Objective:     Vital Signs (Most Recent):  Temp: 97.6 °F (36.4 °C) (02/14/18 0715)  Pulse: 63 (02/14/18 0715)  Resp: 18 (02/14/18 0715)  BP: (!) 131/59 (02/14/18 0715)  SpO2: 100 % (02/14/18 0715) Vital Signs (24h Range):  Temp:  [96.8 °F (36 °C)-99.2 °F (37.3 °C)] 97.6 °F (36.4 °C)  Pulse:  [61-97] 63  Resp:  [16-20] 18  SpO2:  [92 %-100 %] 100 %  BP: (100-131)/(48-62) 131/59     Weight: 40.8 kg (89 lb 15.2 oz)  Body mass index is 17.57 kg/m².    Intake/Output Summary (Last 24 hours) at 02/14/18 1024  Last data filed at 02/14/18 0646   Gross per 24 hour   Intake          1699.58 ml   Output                0 ml   Net          1699.58 ml      Physical Exam   Constitutional: No distress.   cachectic   HENT:   Head: Normocephalic and atraumatic.   Eyes: Pupils are equal, round, and reactive to light.   Neck: Normal range of motion.   Cardiovascular: Normal rate, regular rhythm and normal heart sounds.    Pulmonary/Chest: Effort normal. No respiratory distress. She has no wheezes. She has no rales.    Abdominal: Soft. There is no tenderness.   Hypoactive bowel sounds   Musculoskeletal: She exhibits edema (1+ non-pitting UE edema.).   Neurological:   Awake and alert to self today. Pleasant.    Skin: Skin is dry. No erythema.   -Hands cool; bilateral lower extremities warm       Significant Labs:   BMP:   Recent Labs  Lab 02/14/18  0432   *   *   K 2.8*      CO2 30*   BUN 32*   CREATININE 1.2   CALCIUM 8.0*   MG 1.8     CBC:   Recent Labs  Lab 02/13/18  0853 02/13/18  1803 02/14/18  0831   WBC 11.88 12.68 11.95   HGB 6.7* 9.2* 8.7*   HCT 22.8* 29.8* 28.3*    233 243         Assessment/Plan:      * Bacteremia due to Escherichia coli    - Presented septic on admission - leukocytosis (WBC 15) and hypotension (90/50) with GNR bacteremia and UTI on UA with CHERI and altered mentation. CXR neg for PNA. Also risk of infection via decubitus wound.   - Fluid resuscitated Initial fluid rescucitation 30cc/kg-total 1.5L Ns. IV   - Initial blood culture & urine cultures with pan-sensitive (with exception of tetracycline) E. Coli. Repeat blood cultures 02/09 NGTD.  - S/p 3 days of IV ceftriaxone with transition to PO ciprofloxacin on 2/11 - end date 2/23.   - Did have febrile event at 101.4 at 2/10 but none since.  - Mentation back to baseline on 2/11.             E. coli UTI    - UA with 3+ leuks, 40 WBC, many bacteria  - urine culture with E. Coli  - See bacteremia due to E. coli.           Acute respiratory failure with hypoxia    - Currently on 2L nasal cannula - CXR consistent with pulmonary edema - likely from aggressive IVF here  - IV lasix trialed from 2/11-2/13.  - Suspect aspect of hypoxic anemia - transfused 1U prbc on 2/13 which she tolerated well.  - Holding lasix today as patient with developing contraction alkalosis (HCO3 30 this morning)  - Wean O2 as appropriate, possibly today.         CHERI (acute kidney injury)    - Cr 2.0 on admit baseline 0.8. Likely pre-renal secondary to decreased  fluid intake and sepsis.    - Now resolved.   - Improving to 1.2 (may be new baseline, last creatinine under 1 was September of last year) after IVF rescucitation  - Encouraging PO intake.   - Strict I/O and avoid nephrotoxic agents          Normocytic anemia due to blood loss    - Hemoglobin drop from 9.2 to 7.1 from 2/11 to 2/12  - likely secondary to dilutional anemia (received 2L bolus and IV infusions) with GI bleed - reports of BM by daughter but not dark in appearance.  - Added H2 blocker and ordered FOBT stool.  - Monitor CBC twice daily; patient type and screened and blood consent will be attained.  - Transfused with Hgb<7 - 1U PRBC on 2/13 for hemoglobin of 6.7 --> improved to 8.7 today          Hypernatremia    - Na 164  on admit, likely secondary to dehydration in setting of decreased PO intake.Total water deficit at time 3.5L.  - Improved with D5W gtt - improved to 146 today continued gtt at 75 ml/hr  - Trending BMP q12h.           Severe protein-calorie malnutrition    - Albumin 1.4, poor appetite from advanced dementia   - May consider mirtazapine addition; EKG ordered today to rule out prolong QT prior to starting medication.           History of diabetes mellitus    - On metformin 500mg BD at home. Last HbA1c 5.1% in 09/2017.  - Some hyperglycemia considering D5W gtt - added levemir 10U daily on 2/13 and continue with MDSSI.           Deep tissue injury    - See above.             Alteration in skin integrity    - See above.           Decubitus ulcer of buttock, unstageable    - Wound care consulted, following their recs          Decubitus ulcer of right buttock, stage 2    - Wound care consulted, following their recs.         Acute encephalopathy    - Resolving, secondary to hypernatremia, septic encephalopathy, and advanced Alzheimer's dementia.  - Now at baseline.         Essential hypertension    - Continue home nifedipine 60mg Q24H.             VTE Risk Mitigation         Ordered     heparin  (porcine) injection 5,000 Units  Every 8 hours     Route:  Subcutaneous        02/08/18 2247     Medium Risk of VTE  Once      02/08/18 1303     Place IGNACIA hose  Until discontinued      02/08/18 1107     Place sequential compression device  Until discontinued      02/08/18 1107        Disposition: Patient clinically improved, now s/p 1U PRBC yesterday with good response in H/H. Patient more alert this morning. Still hypernatremic at 146 but may be near patient's baseline. Also with hypoxia still at 2L nasal cannula but I have asked nurse to wean O2, as tolerated; suspect hypoxia may have been aspect of anemia. BNP mildly elevated but physical exam not appreciable for crackles this morning and patient with contraction alkalosis on BMP this morning. Consider mirtazapine for appetite stimulation on discharge.      Vahe Downing MD  PGY-2 Internal Medicine  [phone]    Department of Hospital Medicine   [facility]

## 2018-02-14 NOTE — PLAN OF CARE
Problem: Patient Care Overview  Goal: Plan of Care Review  Outcome: Ongoing (interventions implemented as appropriate)  POC: wean O2 off for saturation 89-92% on room air, IM4 team notified, accu checks Q4 hours, S/S coverage given per orders, heel boots on, skin intact, sacral decubitus wound care done, skin barrier applied, patient incontinent of urine and stool, low air loss overlay on bed, turning Q 2hours, continue pureed diet, tolerated thin liquids good, HOB elevated 30 degrees, consulted CM for hospital bed for d/c home. Remain fall and pain free, will continue to monitor.

## 2018-02-14 NOTE — DISCHARGE SUMMARY
DISCHARGE SUMMARY  Hospital Medicine    Team: [facility] HOSP MED 4    Patient Name: Delfina Pelletier  YOB: 1928    Admit Date: 2/8/2018    Discharge Date: 02/14/2018    Discharge Attending Physician: Lion Ramírez MD     Diagnoses:  Active Hospital Problems    Diagnosis  POA    *Bacteremia due to Escherichia coli [R78.81]  Yes     Priority: 1 - High    E. coli UTI [N39.0, B96.20]  Yes     Priority: 2     Acute respiratory failure with hypoxia [J96.01]  Yes     Priority: 3     CHERI (acute kidney injury) [N17.9]  Yes     Priority: 3     Normocytic anemia due to blood loss [D50.0]  Yes     Priority: 4     Hypernatremia [E87.0]  Yes     Priority: 4     Severe protein-calorie malnutrition [E43]  Yes     Priority: 5     History of diabetes mellitus [Z86.39]  Yes     Priority: 5     Alteration in skin integrity [R23.9]  Yes    Deep tissue injury [T14.8XXA]  Yes    Acute encephalopathy [G93.40]  Yes    Decubitus ulcer of right buttock, stage 2 [L89.312]  Yes    Decubitus ulcer of buttock, unstageable [L89.300]  Yes    Essential hypertension [I10]  Yes      Resolved Hospital Problems    Diagnosis Date Resolved POA    Elevated troponin [R74.8] 02/10/2018 Yes    Lactic acidosis [E87.2] 02/10/2018 Yes    Sacral decubitus ulcer [L89.159] 02/09/2018 Yes       Discharged Condition: admit problems have stabilized       HOSPITAL COURSE:    Initial Presentation:  89 yr old female with PMHx dementia, HTN, HLD, DM 2 who presents to [facility] ED with chief complaint of fatigue. History obtained from family and chart review. Onset of fatigue yesterday morning. Family noted that patient was sleeping throughout the day, lethargic, and talking less. They assisted her back to bed but she was unable to get out of bed on her own and they decided to bring her to the ED. Associated with decreased PO intake the last few days. No fever, chills, chest pain, dyspnea, cough, vomiting, diarrhea, foul-smelling urine,  hematuria, dysuria or distress noted. ROS positive for weight loss, declining mentation, constipation, and right buttock wound (appears worse than 4 wks ago, no blood, pus, or mal-odor noted.)    Patient admitted to observation in 08/2017 for dehydration with hypernatremia & UTI (viridians strep); around that time, patient eating less and losing weight. Patient also visited ED in 09/2017 for transient alteration in awareness; work-up negative and attributed to progressive dementia. History of UTIs with klebsiella, E. Coli, and viridians strep. Patient diagnosed with Alzheimer's disease 2 years ago; prior to that she had been socially active. Currently at baseline, patient is oriented only to self, she does not recognize daughter or sister (since 2 months ago.) She self-ambulates and self voids. Lives at home with daughter.    Course of Principle Problem for Admission:  Patient presents to ED with chief complaints of lethargy and altered mental status. On admit, patient with severe sepsis with CHERI and septic encephalopathy (WBC 15, hypotensive 90/50) with UTI on UA. Also hypernatremic (164), and CHERI with Cr 2.0 (baseline 1.0). Patient was given sepsis bolus and started on ceftriaxone for treatment of UTI. Patient was admitted to medicine team 4 and started on D5W for treatment of hypernatremia. Patient's blood culture and urine cultures from 2/8 were positive for E. Coli and IV ceftriaxone was changed to PO ciprofloxacin on 2/11 and patient has tolerated well. Family meeting on 2/13 which family wishes for patient to be discharged home on home health. Patient's code made DNR. By 2/14, patient's hypernatremia resolved to 138 and CHERI resolved with creatinine of 1.3 (possibly new baseline). Mentation was also back to baseline at this time. She was discharged in stable condition with home health with hospital bed.     Medical Problems Addressed in the Hospital:  Bacteremia due to Escherichia coli     - Presented septic  on admission - leukocytosis (WBC 15) and hypotension (90/50) with GNR bacteremia and UTI on UA with CEHRI and altered mentation. CXR neg for PNA. Also risk of infection via decubitus wound.   - Fluid resuscitated Initial fluid rescucitation 30cc/kg-total 1.5L Ns. IV   - Initial blood culture & urine cultures with pan-sensitive (with exception of tetracycline) E. Coli. Repeat blood cultures 02/09 NGTD.  - S/p 3 days of IV ceftriaxone with transition to PO ciprofloxacin on 2/11 - end date 2/23.   - Did have febrile event at 101.4 at 2/10 but none since.  - Mentation back to baseline on 2/11 with leukocytosis resolved by day of discharge.              E. coli UTI     - UA with 3+ leuks, 40 WBC, many bacteria  - urine culture with E. Coli  - See bacteremia due to E. coli.           Acute respiratory failure with hypoxia     - 2L nasal cannula on 2/11-/213 with CXR consistent with pulmonary edema - likely from aggressive IVF here  - IV lasix trialed from 2/11-2/13.  - Suspect aspect of hypoxic anemia - transfused 1U prbc on 2/13 which she tolerated well.  - Patient was weaned to room air with SpO2>90% on 2/14.        CHERI (acute kidney injury)     - Cr 2.0 on admit baseline 0.8. Likely pre-renal secondary to decreased fluid intake and sepsis.    - Improved to 1.2-1.3 on last several days of hospital course. (may be new baseline, last creatinine under 1 was September of last year) after IVF rescucitation            Normocytic anemia due to blood loss     - Hemoglobin drop from 9.2 to 7.1 from 2/11 to 2/12  - likely secondary to dilutional anemia (received 2L bolus and IV infusions) with GI bleed - reports of BM by daughter but not dark in appearance.  - Added H2 blocker and ordered FOBT stool.  - Transfused with Hgb<7 - 1U PRBC on 2/13 for hemoglobin of 6.7 --> improved to 8.7 following day and has remained stable at value on discharge.           Hypernatremia     - Na 164  on admit, likely secondary to dehydration in  setting of decreased PO intake.Total water deficit at time 3.5L.  - Improved with D5W gtt - improved to 138 on day of discharge 2/14.   - Trending BMP q12h.           Severe protein-calorie malnutrition     - Albumin 1.4, poor appetite from advanced dementia   - EKG ordered today to rule out prolong QT prior to starting mirtazapine (QTc 419) --> mirtazapine started on discharge.          History of diabetes mellitus     - On metformin 500mg BD at home. Last HbA1c 5.1% in 09/2017.  - Some hyperglycemia considering D5W gtt - added levemir 10U daily on 2/13 and continue with MDSSI.   - Restarted metformin on discharge.        Deep tissue injury     - See above.              Alteration in skin integrity     - See above.           Decubitus ulcer of buttock, unstageable     - Wound care consulted, following their recs          Decubitus ulcer of right buttock, stage 2     - Wound care consulted, following their recs.        Acute encephalopathy     - Resolving, secondary to hypernatremia, septic encephalopathy, and advanced Alzheimer's dementia.  - Now at baseline.        Essential hypertension     - Continue home nifedipine 60mg Q24H.                2/8/2018 08:27 2/8/2018 14:57 2/8/2018 19:45 2/9/2018 02:04 2/9/2018 08:29 2/9/2018 13:56 2/9/2018 20:46 2/10/2018 05:54 2/10/2018 08:19 2/10/2018 14:15 2/11/2018 05:40 2/11/2018 05:40 2/11/2018 15:58 2/12/2018 03:45 2/12/2018 03:45 2/12/2018 18:16 2/13/2018 05:52 2/13/2018 18:03 2/14/2018 04:32 2/14/2018 15:36   Sodium 164 (HH) 161 (HH) 161 (HH) 163 (HH) 159 (H) 159 (H) 154 (H) 152 (H) 151 (H) 149 (H) 152 (H) 152 (H) 153 (H) 147 (H) 147 (H) 147 (H) 150 (H) 146 (H) 146 (H) 138   Potassium 3.6 2.8 (L) 3.0 (L) 3.6 3.9 3.7 3.9 4.0 3.7 3.5 3.6 3.6 3.2 (L) 3.4 (L) 3.4 (L) 3.5 4.5 3.6 2.8 (L) 3.9   Chloride 125 (H) 127 (HH) 126 (H) 126 (H) 125 (H) 124 (H) 119 (H) 121 (H) 121 (H) 118 (H) 121 (H) 121 (H) 120 (H) 117 (H) 117 (H) 114 (H) 118 (H) 113 (H) 106 100   CO2 28 24 27 27 25 25  "25 21 (L) 23 23 22 (L) 22 (L) 23 25 25 25 23 25 30 (H) 32 (H)   Anion Gap 11 10 8 10 9 10 10 10 7 (L) 8 9 9 10 5 (L) 5 (L) 8 9 8 10 6 (L)   BUN, Bld 39 (H) 34 (H) 30 (H) 30 (H) 28 (H) 30 (H) 35 (H) 32 (H) 32 (H) 32 (H) 29 (H) 29 (H) 28 (H) 36 (H) 36 (H) 40 (H) 38 (H) 35 (H) 32 (H) 33 (H)   Creatinine 2.0 (H) 1.5 (H) 1.3 1.4 1.3 1.5 (H) 1.7 (H) 1.8 (H) 1.8 (H) 1.8 (H) 1.5 (H) 1.5 (H) 1.4 1.4 1.4 1.3 1.3 1.2 1.2 1.3       CONSULTS:   - Wound care     Other Pertinent Lab Findings:   6d ago    Blood Culture, Routine Gram stain mary bottle: Gram negative rodsVC    Blood Culture, Routine Results called to and read back by:Tahmina Pedro RN 02/08/2018  21:08VC    Blood Culture, Routine ESCHERICHIA COLIVC    Resulting Agency [facility]   Susceptibility      Escherichia coli     CULTURE, BLOOD     Amox/K Clav'ate <=8/4 "><=8/4  Sensitive     Amp/Sulbactam 16/8  Intermediate     Ampicillin >16  Resistant     Cefazolin 8  Sensitive (C) 1     Ceftriaxone <=1 "><=1  Sensitive (C) 2     Ciprofloxacin <=0.5 "><=0.5  Sensitive     Ertapenem <=0.5 "><=0.5  Sensitive     Gentamicin <=1 "><=1  Sensitive     Meropenem <=1 "><=1  Sensitive     Piperacillin/Tazo <=8 "><=8  Sensitive     Tetracycline <=2 "><=2  Sensitive     Tobramycin <=2 "><=2  Sensitive     Trimeth/Sulfa <=0.5/9.5 "><=0.5/9.5  Sensitive           1 REVISED REPORT-Org Escherichia coli - Cefazolin previously reported   as 8 S, at 09:00 on 02/10/2018 by I/AUT   2 REVISED REPORT-Org Escherichia coli - Ceftriaxone previously reported   as <=1 S, at 09:00 on 02/10/2018 by I/AUT      Narrative     Blood Culture #2      Specimen Collected: 02/08/18 08:36 Last Resulted: 02/11/18 10:11 Lab Flowsheet Order Details View Encounter Lab and Collection Details Routing Result History - Result Edited      VC=Value has a corrected status           Urine culture   Order: [id number]   Status:  Final result   Visible to patient:  No (Not Released) Next appt:  02/23/2018 at 01:00 PM in Family " "Medicine (Dimitri Castellanos MD)    6d ago   Urine Culture, Routine ESCHERICHIA COLI   >100,000 cfu/ml        Resulting Agency [facility]   Susceptibility      Escherichia coli     CULTURE, URINE     Amox/K Clav'ate <=8/4 "><=8/4  Sensitive     Amp/Sulbactam >16/8  Resistant     Ampicillin >16  Resistant     Cefazolin 16  Intermediate     Cefepime <=8 "><=8  Sensitive     Ceftriaxone <=8 "><=8  Sensitive     Ciprofloxacin <=1 "><=1  Sensitive     Ertapenem <=2 "><=2  Sensitive     Gentamicin <=4 "><=4  Sensitive     Meropenem <=4 "><=4  Sensitive     Nitrofurantoin <=32 "><=32  Sensitive     Piperacillin/Tazo <=16 "><=16  Sensitive     Tetracycline <=4 "><=4  Sensitive     Tobramycin <=4 "><=4  Sensitive     Trimeth/Sulfa <=2/38 "><=2/38  Sensitive           Narrative     Preferred Collection Type->Urine, Clean Catch      Specimen Collected: 02/08/18 10:06 Last Resulted: 02/10/18 10:21                  Pertinent/Significant Diagnostic Studies:    Imaging Results          X-Ray Chest 1 View (Final result)  Result time 02/11/18 11:58:41    Final result by Librado Jhaveri III, MD (02/11/18 11:58:41)                 Narrative:    One view: There is cardiomegaly, moderate CHF, a left pneumonectomy and shift of heart to the left.      Electronically signed by: LIBRADO JHAVERI MD  Date:     02/11/18  Time:    11:58                              X-Ray Chest 1 View (Final result)  Result time 02/09/18 12:58:39    Final result by Poli Varner MD (02/09/18 12:58:39)                 Impression:     As above.      Electronically signed by: Poli Varner MD  Date:     02/09/18  Time:    12:58              Narrative:    Comparison is made to 2/8/18.    Complete opacification of the left hemithorax with mediastinal shift to the ipsilateral left side and deformity of the osseous thoracic wall on the left are all again observed, consistent with a prior pneumonectomy/thoracoplasty procedure.  Right lung remains clear, and free of significant airspace " consolidation or volume loss.  No pleural fluid on the right.  No pneumothorax.  No significant detrimental interval change in the appearance of the chest since 2/8/18 is appreciated.                             CT Head Without Contrast (Final result)  Result time 02/08/18 10:58:57    Final result by Michell Navarrete MD (02/08/18 10:58:57)                 Impression:         No evidence of acute hemorrhage or major vascular distribution infarct.    Generalized cerebral volume loss with chronic microvascular ischemic changes and a remote right cerebellar infarct.    Chronic inflammatory changes of the partially visualized paranasal sinuses.  ______________________________________     Electronically signed by resident: MICHELL HOLLINGSWORTH MD  Date:     02/08/18  Time:    09:39            As the supervising and teaching physician, I personally reviewed the images and resident's interpretation and I agree with the findings.          Electronically signed by: MICHELL NAVARRETE MD  Date:     02/08/18  Time:    10:58              Narrative:    CT head without contrast    02/08/18 08:45:24    Accession# [id number]    CLINICAL INDICATION: 89 year old F with Confusion/delirium, altered LOC, unexplained       TECHNIQUE: Axial CT images obtained throughout the region of the head without the use of intravenous contrast. Axial, sagittal and coronal reconstructions were performed.    COMPARISON: CT head 11/25/04    FINDINGS:    Moderate degree of generalized volume loss. Configuration does not suggest hydrocephalus.    No evidence of acute hemorrhage. No acute major vascular distribution infarct. Remote right cerebellar infarct. Mild/moderate chronic microvascular ischemic change. No parenchymal mass or mass effect.    No extra-axial blood or fluid collection.    The osseous calvarium is intact. Hyperostosis frontalis.    Essentially complete opacification of the partially visualized left maxillary sinus with surrounding osseous thickening  and sclerosis of chronic sinus disease. Patchy mucosal thickening in the ethmoid air cells. Periosteal thickening in the right sphenoid sinus also consistent with chronic inflammation. The bilateral mastoid air cells are clear.                             X-Ray Chest AP Portable (Final result)  Result time 02/08/18 09:03:57    Final result by Poli Varner MD (02/08/18 09:03:57)                 Impression:     No significant abnormality directly referable to the current history of chest pain is observed.  No significant detrimental interval change in the appearance of the chest since 9/17/17 is appreciated.      Electronically signed by: Poli Varner MD  Date:     02/08/18  Time:    09:03              Narrative:    Comparison is made to 9/17/17.    Complete opacification of the left hemithorax and deformity of the thoracic wall on this side is again observed, stable in appearance since the prior exam and consistent with prior pneumonectomy/thoracoplasty.  Mediastinal shift to the left is again seen, and the left cardiac border is completely obscured by the process in the left hemithorax.  Pulmonary vascularity in the right lung is normal, and the right lung is clear and free of significant airspace consolidation or volume loss.  No pleural fluid on the right.  No pneumothorax.  Calcification in the wall of the aortic arch is again observed, as is scoliosis convex to the left in the upper thoracic region.                              Disposition:  Home with Home Health     Future Scheduled Appointments:  Future Appointments  Date Time Provider Department Center   2/23/2018 1:00 PM Dimitri Castellaons MD [facility]       Follow-up Plans from This Hospitalization:  Please continue an oral antibiotic we have prescribed, ciprofloxacin, with the last day being 2/23/2018 for your urinary tract infection and bacteremia. Your infection is being appropriately treated with this antibiotic. You take the antibiotic once daily.      We have also started you on a medication called mirtazapine which both helps with appetite and aids in sleep. Take one tablet nightly.     Both medications are sent to the pharmacy on your after-visit-summary.     Home health should be ordered who will visit for physical therapy, wound care, and provide an aide for intermittent nursing care.     Continue home medications on discharge including metformin twice daily for your diabetes.     Last CBC/BMP/HgbA1c (if applicable):  Recent Results (from the past 336 hour(s))   CBC auto differential    Collection Time: 02/14/18  8:31 AM   Result Value Ref Range    WBC 11.95 3.90 - 12.70 K/uL    Hemoglobin 8.7 (L) 12.0 - 16.0 g/dL    Hematocrit 28.3 (L) 37.0 - 48.5 %    Platelets 243 150 - 350 K/uL   CBC auto differential    Collection Time: 02/13/18  6:03 PM   Result Value Ref Range    WBC 12.68 3.90 - 12.70 K/uL    Hemoglobin 9.2 (L) 12.0 - 16.0 g/dL    Hematocrit 29.8 (L) 37.0 - 48.5 %    Platelets 233 150 - 350 K/uL   CBC auto differential    Collection Time: 02/13/18  8:53 AM   Result Value Ref Range    WBC 11.88 3.90 - 12.70 K/uL    Hemoglobin 6.7 (L) 12.0 - 16.0 g/dL    Hematocrit 22.8 (L) 37.0 - 48.5 %    Platelets 210 150 - 350 K/uL     Recent Results (from the past 336 hour(s))   Basic metabolic panel    Collection Time: 02/14/18  3:36 PM   Result Value Ref Range    Sodium 138 136 - 145 mmol/L    Potassium 3.9 3.5 - 5.1 mmol/L    Chloride 100 95 - 110 mmol/L    CO2 32 (H) 23 - 29 mmol/L    BUN, Bld 33 (H) 8 - 23 mg/dL    Creatinine 1.3 0.5 - 1.4 mg/dL    Calcium 8.1 (L) 8.7 - 10.5 mg/dL    Anion Gap 6 (L) 8 - 16 mmol/L   Basic metabolic panel    Collection Time: 02/14/18  4:32 AM   Result Value Ref Range    Sodium 146 (H) 136 - 145 mmol/L    Potassium 2.8 (L) 3.5 - 5.1 mmol/L    Chloride 106 95 - 110 mmol/L    CO2 30 (H) 23 - 29 mmol/L    BUN, Bld 32 (H) 8 - 23 mg/dL    Creatinine 1.2 0.5 - 1.4 mg/dL    Calcium 8.0 (L) 8.7 - 10.5 mg/dL    Anion Gap 10 8 - 16  mmol/L   Basic metabolic panel    Collection Time: 02/13/18  6:03 PM   Result Value Ref Range    Sodium 146 (H) 136 - 145 mmol/L    Potassium 3.6 3.5 - 5.1 mmol/L    Chloride 113 (H) 95 - 110 mmol/L    CO2 25 23 - 29 mmol/L    BUN, Bld 35 (H) 8 - 23 mg/dL    Creatinine 1.2 0.5 - 1.4 mg/dL    Calcium 8.0 (L) 8.7 - 10.5 mg/dL    Anion Gap 8 8 - 16 mmol/L     Lab Results   Component Value Date    HGBA1C 5.4 02/08/2018       Discharge Medication List:     Medication List      START taking these medications    ciprofloxacin HCl 500 MG tablet  Commonly known as:  CIPRO  Take 1 tablet (500 mg total) by mouth once daily.     mirtazapine 7.5 MG Tab  Commonly known as:  REMERON  Take 1 tablet (7.5 mg total) by mouth every evening.        CONTINUE taking these medications    blood sugar diagnostic Strp  1 each by Misc.(Non-Drug; Combo Route) route once daily. True Metrix Glucometer Test Strips Test blood sugar once daily     donepezil 10 MG tablet  Commonly known as:  ARICEPT  Take 1 tablet (10 mg total) by mouth once daily.     lancets Misc  1 each by Misc.(Non-Drug; Combo Route) route once daily. TRUE METRIX METER     NIFEdipine 60 MG (OSM) 24 hr tablet  Commonly known as:  NIFEDICAL XL  Take 1 tablet (60 mg total) by mouth once daily.     rosuvastatin 20 MG tablet  Commonly known as:  CRESTOR  Take 1 tablet (20 mg total) by mouth once daily.           Where to Get Your Medications      These medications were sent to [facility] - ALEKSANDER GOODE [facility] [place] [address] [facility] [address]    Phone:  [phone]   · ciprofloxacin HCl 500 MG tablet  · mirtazapine 7.5 MG Tab         Patient Instructions:    Discharge Procedure Orders  HOSPITAL BED FOR HOME USE   Order Specific Question Answer Comments   Type: Semi-electric    Length of need (1-99 months): 99    Does patient have medical equipment at home? glucometer    Height: 5' (1.524 m)    Weight: 40.8 kg (89 lb 15.2 oz)     Please check all that apply: Patient requires positioning of the body in ways not feasible in an ordinary bed due to a medical condition which is expected to last at least one month.    Vendor: [facility]   Expected Date of Delivery: 2/14/2018      Notify your health care provider if you experience any of the following:  temperature >100.4     Notify your health care provider if you experience any of the following:  difficulty breathing or increased cough     Notify your health care provider if you experience any of the following:  persistent dizziness, light-headedness, or visual disturbances     Notify your health care provider if you experience any of the following:  increased confusion or weakness         Signing Physician:    Vahe Downing MD  PGY-2 Internal Medicine  [phone]    Discharge summary reviewed and changes made      Lion Ramírez M.D.  Attending Physician  [facility] Medicine Dept.  Pager: [phone]  Spectralink -x [phone]

## 2018-02-14 NOTE — PROGRESS NOTES
"Addendum of my note yesterday, patient's family wishes patient to be taken "home with home health** " not hospice.     Vahe Downing MD  PGY-2 Internal Medicine  [phone]    "

## 2018-02-14 NOTE — ASSESSMENT & PLAN NOTE
- Albumin 1.4, poor appetite from advanced dementia   - May consider mirtazapine addition; EKG ordered today to rule out prolong QT prior to starting medication.

## 2018-02-14 NOTE — SUBJECTIVE & OBJECTIVE
Interval History:   No acute events overnight. Patient tolerated 1U prbc yesterday without complication and patient is more alert on visit. Still on 2L nasal cannula but denies shortness of breath. UOP undetermined as she soaks pads. Without recorded fevers. Patient denies shortness of breath or pain this morning.     Review of Systems   Unable to perform ROS: Dementia     Objective:     Vital Signs (Most Recent):  Temp: 97.6 °F (36.4 °C) (02/14/18 0715)  Pulse: 63 (02/14/18 0715)  Resp: 18 (02/14/18 0715)  BP: (!) 131/59 (02/14/18 0715)  SpO2: 100 % (02/14/18 0715) Vital Signs (24h Range):  Temp:  [96.8 °F (36 °C)-99.2 °F (37.3 °C)] 97.6 °F (36.4 °C)  Pulse:  [61-97] 63  Resp:  [16-20] 18  SpO2:  [92 %-100 %] 100 %  BP: (100-131)/(48-62) 131/59     Weight: 40.8 kg (89 lb 15.2 oz)  Body mass index is 17.57 kg/m².    Intake/Output Summary (Last 24 hours) at 02/14/18 1024  Last data filed at 02/14/18 0646   Gross per 24 hour   Intake          1699.58 ml   Output                0 ml   Net          1699.58 ml      Physical Exam   Constitutional: No distress.   cachectic   HENT:   Head: Normocephalic and atraumatic.   Eyes: Pupils are equal, round, and reactive to light.   Neck: Normal range of motion.   Cardiovascular: Normal rate, regular rhythm and normal heart sounds.    Pulmonary/Chest: Effort normal. No respiratory distress. She has no wheezes. She has no rales.   Abdominal: Soft. There is no tenderness.   Hypoactive bowel sounds   Musculoskeletal: She exhibits edema (1+ non-pitting UE edema.).   Neurological:   Awake and alert to self today. Pleasant.    Skin: Skin is dry. No erythema.   -Hands cool; bilateral lower extremities warm       Significant Labs:   BMP:   Recent Labs  Lab 02/14/18  0432   *   *   K 2.8*      CO2 30*   BUN 32*   CREATININE 1.2   CALCIUM 8.0*   MG 1.8     CBC:   Recent Labs  Lab 02/13/18  0853 02/13/18  1803 02/14/18  0831   WBC 11.88 12.68 11.95   HGB 6.7* 9.2* 8.7*    HCT 22.8* 29.8* 28.3*    [phone]

## 2018-02-14 NOTE — ASSESSMENT & PLAN NOTE
- Cr 2.0 on admit baseline 0.8. Likely pre-renal secondary to decreased fluid intake and sepsis.    - Now resolved.   - Improving to 1.2 (may be new baseline, last creatinine under 1 was September of last year) after IVF rescucitation  - Encouraging PO intake.   - Strict I/O and avoid nephrotoxic agents

## 2018-02-14 NOTE — ASSESSMENT & PLAN NOTE
- Currently on 2L nasal cannula - CXR consistent with pulmonary edema - likely from aggressive IVF here  - IV lasix trialed from 2/11-2/13.  - Suspect aspect of hypoxic anemia - transfused 1U prbc on 2/13 which she tolerated well.  - Holding lasix today as patient with developing contraction alkalosis (HCO3 30 this morning)  - Wean O2 as appropriate, possibly today.

## 2018-02-14 NOTE — ASSESSMENT & PLAN NOTE
- On metformin 500mg BD at home. Last HbA1c 5.1% in 09/2017.  - Some hyperglycemia considering D5W gtt - added levemir 10U daily on 2/13 and continue with MDSSI.

## 2018-02-14 NOTE — PLAN OF CARE
CM informed mitchel pt will d/c today with HH )[facility]) and hospital bed. MITCHEL sent referral to [facility] and [facility]. MITCHEL left message for Chin at [facility]. MITCHEL left message for Meghan with [facility].    Pt accepted by [facility].    Pamella Treadwell, [facility] [phone]

## 2018-02-14 NOTE — ASSESSMENT & PLAN NOTE
- Hemoglobin drop from 9.2 to 7.1 from 2/11 to 2/12  - likely secondary to dilutional anemia (received 2L bolus and IV infusions) with GI bleed - reports of BM by daughter but not dark in appearance.  - Added H2 blocker and ordered FOBT stool.  - Monitor CBC twice daily; patient type and screened and blood consent will be attained.  - Transfused with Hgb<7 - 1U PRBC on 2/13 for hemoglobin of 6.7 --> improved to 8.7 today

## 2018-02-14 NOTE — PLAN OF CARE
[facility]    HOME HEALTH ORDERS  FACE TO FACE ENCOUNTER    Patient Name: Delfina Pelletier  YOB: 1928    PCP: Dimitri Castellanos MD   PCP Address: [address] / [place] [address]  PCP Phone Number: [phone]  PCP Fax: [phone]    Encounter Date: 02/14/2018    Admit to Home Health    Diagnoses:  Active Hospital Problems    Diagnosis  POA    *Bacteremia due to Escherichia coli [R78.81]  Yes     Priority: 1 - High    E. coli UTI [N39.0, B96.20]  Yes     Priority: 2     Acute respiratory failure with hypoxia [J96.01]  Yes     Priority: 3     CHERI (acute kidney injury) [N17.9]  Yes     Priority: 3     Normocytic anemia due to blood loss [D50.0]  Yes     Priority: 4     Hypernatremia [E87.0]  Yes     Priority: 4     Severe protein-calorie malnutrition [E43]  Yes     Priority: 5     History of diabetes mellitus [Z86.39]  Yes     Priority: 5     Alteration in skin integrity [R23.9]  Yes    Deep tissue injury [T14.8XXA]  Yes    Acute encephalopathy [G93.40]  Yes    Decubitus ulcer of right buttock, stage 2 [L89.312]  Yes    Decubitus ulcer of buttock, unstageable [L89.300]  Yes    Essential hypertension [I10]  Yes      Resolved Hospital Problems    Diagnosis Date Resolved POA    Elevated troponin [R74.8] 02/10/2018 Yes    Lactic acidosis [E87.2] 02/10/2018 Yes    Sacral decubitus ulcer [L89.159] 02/09/2018 Yes       No future appointments.        I have seen and examined this patient face to face today. My clinical findings that support the need for the home health skilled services and home bound status are the following:  Weakness/numbness causing balance and gait disturbance due to Weakness/Debility and Dehydration making it taxing to leave home.  Requiring assistive device to leave home due to unsteady gait caused by  Weakness/Debility and Dehydration.  Mental confusion making it unsafe for patient to leave home alone due to  Dementia.    Allergies:Review of  patient's allergies indicates:  No Known Allergies    Diet: pureed diet honey thick    Activities: ambulate in house with assistance and bedrest    Nursing:   SN to complete comprehensive assessment including routine vital signs. Instruct on disease process and s/s of complications to report to MD. Review/verify medication list sent home with the patient at time of discharge  and instruct patient/caregiver as needed. Frequency may be adjusted depending on start of care date.    Notify MD if SBP > 160 or < 90; DBP > 90 or < 50; HR > 120 or < 50; Temp > 101      CONSULTS:    Physical Therapy to evaluate and treat. Evaluate for home safety and equipment needs; Establish/upgrade home exercise program. Perform / instruct on therapeutic exercises, gait training, transfer training, and Range of Motion.  Occupational Therapy to evaluate and treat. Evaluate home environment for safety and equipment needs. Perform/Instruct on transfers, ADL training, ROM, and therapeutic exercises.  Speech Therapy  to evaluate and treat for  Swallowing.  Aide to provide assistance with personal care, ADLs, and vital signs.    MISCELLANEOUS CARE:  Routine Skin for Bedridden Patients: Instruct patient/caregiver to apply moisture barrier cream to all skin folds and wet areas in perineal area daily and after baths and all bowel movements.    WOUND CARE ORDERS  yes:  Wound Care Orders:  yes:  right heel, left heel chronic DTI and end of life skin changes on sacrum      Medications: Review discharge medications with patient and family and provide education.      Current Discharge Medication List      START taking these medications    Details   ciprofloxacin HCl (CIPRO) 500 MG tablet Take 1 tablet (500 mg total) by mouth once daily.  Qty: 9 tablet, Refills: 0      mirtazapine (REMERON) 7.5 MG Tab Take 1 tablet (7.5 mg total) by mouth every evening.  Qty: 60 tablet, Refills: 4         CONTINUE these medications which have NOT CHANGED    Details    blood sugar diagnostic Strp 1 each by Misc.(Non-Drug; Combo Route) route once daily. True Metrix Glucometer Test Strips  Test blood sugar once daily  Qty: 100 each, Refills: 1    Associated Diagnoses: Type 2 diabetes mellitus without complication, unspecified long term insulin use status      donepezil (ARICEPT) 10 MG tablet Take 1 tablet (10 mg total) by mouth once daily.  Qty: 90 tablet, Refills: 3    Associated Diagnoses: Dementia without behavioral disturbance, unspecified dementia type      lancets Misc 1 each by Misc.(Non-Drug; Combo Route) route once daily. TRUE METRIX METER  Qty: 100 each, Refills: 1    Associated Diagnoses: Type 2 diabetes mellitus without complication, unspecified long term insulin use status      NIFEdipine (NIFEDICAL XL) 60 MG (OSM) 24 hr tablet Take 1 tablet (60 mg total) by mouth once daily.  Qty: 90 tablet, Refills: 3    Associated Diagnoses: Hypertension associated with diabetes      rosuvastatin (CRESTOR) 20 MG tablet Take 1 tablet (20 mg total) by mouth once daily.  Qty: 90 tablet, Refills: 3    Associated Diagnoses: Combined hyperlipidemia associated with type 2 diabetes mellitus         STOP taking these medications       docusate sodium (STOOL SOFTENER) 250 MG capsule Comments:   Reason for Stopping:               I certify that this patient is confined to her home and needs intermittent skilled nursing care, physical therapy, speech therapy and occupational therapy.    Vahe Downing MD  PGY-2 Internal Medicine  [phone]

## 2018-02-14 NOTE — PLAN OF CARE
MARIA C spoke with Meghan at [email or web address] has been received and is being sent to [facility] for hospital bed.    Marjorie at [facility] spoke with pt's dtg and has made arrangements for bed delivery.    MARIA C spoke with pt's dtg Naomi-she confirmed that E will call when they are on the way to her home to deliver the bed. Naomi will then call family at the bedside once bed is delivered. MARIA C informed Naomi that HH has been set up as well with [facility].     MARIA C informed CM.    No further SW needs.    Pamella Treadwell, [facility] [phone]

## 2018-02-15 DIAGNOSIS — Z86.39 HISTORY OF DIABETES MELLITUS: Primary | ICD-10-CM

## 2018-02-15 LAB
BACTERIA BLD CULT: NORMAL
BACTERIA BLD CULT: NORMAL

## 2018-02-15 RX ORDER — METFORMIN HYDROCHLORIDE 500 MG/1
500 TABLET ORAL 2 TIMES DAILY WITH MEALS
Qty: 180 TABLET | Refills: 2
Start: 2018-02-15 | End: 2019-02-15

## 2018-02-15 NOTE — TELEPHONE ENCOUNTER
----- Message from Hilary Elllawrencejanine sent at 2/15/2018  8:37 AM CST -----  Contact: Self  Patient has been in hospital since last Thursday 2/8/18 and came home 2/14/18. She is out of metformin. She would like a medication refill. [phone].    metFORMIN (GLUCOPHAGE) 500 MG tablet      [id number] [facility] - [place] - [address] [facility] [address]

## 2018-02-15 NOTE — TELEPHONE ENCOUNTER
Spoke w/patient's daughter, informed of recommendation. States ok, she will continue to monitor and records patient's BS.

## 2018-02-15 NOTE — PROGRESS NOTES
AVS d/c instruction given to family (sister) voices understanding. PIV and telemetry d/regan peer orders. Waiting mels transfer w/c van.

## 2018-02-15 NOTE — TELEPHONE ENCOUNTER
I would not take the metformin.  I had talked to the family about this before her hospitalization - her sugars had been very good.  They can still check her sugars.  If she goes up > 200 and stays up, then I would restart but really I don't think she needs it.

## 2018-02-20 ENCOUNTER — TELEPHONE (OUTPATIENT)
Dept: FAMILY MEDICINE | Facility: CLINIC | Age: 83
End: 2018-02-20

## 2018-02-20 ENCOUNTER — HOSPITAL ENCOUNTER (INPATIENT)
Facility: HOSPITAL | Age: 83
LOS: 4 days | Discharge: HOME HEALTH | DRG: 189 | End: 2018-02-24
Attending: EMERGENCY MEDICINE | Admitting: EMERGENCY MEDICINE
Payer: MEDICARE

## 2018-02-20 DIAGNOSIS — L89.300 DECUBITUS ULCER OF BUTTOCK, UNSTAGEABLE, UNSPECIFIED LATERALITY: Primary | ICD-10-CM

## 2018-02-20 DIAGNOSIS — R09.02 HYPOXIA: Primary | ICD-10-CM

## 2018-02-20 DIAGNOSIS — I15.2 HYPERTENSION ASSOCIATED WITH DIABETES: Chronic | ICD-10-CM

## 2018-02-20 DIAGNOSIS — J96.01 ACUTE RESPIRATORY FAILURE WITH HYPOXIA: ICD-10-CM

## 2018-02-20 DIAGNOSIS — E11.59 HYPERTENSION ASSOCIATED WITH DIABETES: Chronic | ICD-10-CM

## 2018-02-20 DIAGNOSIS — I10 ESSENTIAL HYPERTENSION: ICD-10-CM

## 2018-02-20 DIAGNOSIS — I35.9 NONRHEUMATIC AORTIC VALVE DISORDER: ICD-10-CM

## 2018-02-20 PROBLEM — E78.5 HYPERLIPIDEMIA: Chronic | Status: ACTIVE | Noted: 2018-02-20

## 2018-02-20 LAB
ALBUMIN SERPL BCP-MCNC: 1.9 G/DL
ALP SERPL-CCNC: 95 U/L
ALT SERPL W/O P-5'-P-CCNC: 42 U/L
ANION GAP SERPL CALC-SCNC: 10 MMOL/L
APTT BLDCRRT: 21.2 SEC
AST SERPL-CCNC: 44 U/L
BACTERIA #/AREA URNS AUTO: ABNORMAL /HPF
BASOPHILS # BLD AUTO: 0.03 K/UL
BASOPHILS NFR BLD: 0.2 %
BILIRUB SERPL-MCNC: 0.2 MG/DL
BILIRUB UR QL STRIP: NEGATIVE
BNP SERPL-MCNC: 88 PG/ML
BUN SERPL-MCNC: 20 MG/DL
CALCIUM SERPL-MCNC: 8.9 MG/DL
CHLORIDE SERPL-SCNC: 102 MMOL/L
CLARITY UR REFRACT.AUTO: ABNORMAL
CO2 SERPL-SCNC: 29 MMOL/L
COLOR UR AUTO: YELLOW
CORTIS SERPL-MCNC: 28 UG/DL
CREAT SERPL-MCNC: 1 MG/DL
DIFFERENTIAL METHOD: ABNORMAL
EOSINOPHIL # BLD AUTO: 0.1 K/UL
EOSINOPHIL NFR BLD: 0.4 %
ERYTHROCYTE [DISTWIDTH] IN BLOOD BY AUTOMATED COUNT: 14.7 %
EST. GFR  (AFRICAN AMERICAN): 57.7 ML/MIN/1.73 M^2
EST. GFR  (NON AFRICAN AMERICAN): 50.1 ML/MIN/1.73 M^2
FLUAV AG SPEC QL IA: NEGATIVE
FLUBV AG SPEC QL IA: NEGATIVE
GLUCOSE SERPL-MCNC: 309 MG/DL
GLUCOSE UR QL STRIP: ABNORMAL
HCT VFR BLD AUTO: 31.9 %
HGB BLD-MCNC: 9.9 G/DL
HGB UR QL STRIP: ABNORMAL
IMM GRANULOCYTES # BLD AUTO: 0.08 K/UL
IMM GRANULOCYTES NFR BLD AUTO: 0.6 %
INR PPP: 1
KETONES UR QL STRIP: NEGATIVE
LACTATE SERPL-SCNC: 1.2 MMOL/L
LEUKOCYTE ESTERASE UR QL STRIP: ABNORMAL
LIPASE SERPL-CCNC: 27 U/L
LYMPHOCYTES # BLD AUTO: 1.1 K/UL
LYMPHOCYTES NFR BLD: 8.7 %
MAGNESIUM SERPL-MCNC: 1.9 MG/DL
MCH RBC QN AUTO: 28.1 PG
MCHC RBC AUTO-ENTMCNC: 31 G/DL
MCV RBC AUTO: 91 FL
MICROSCOPIC COMMENT: ABNORMAL
MONOCYTES # BLD AUTO: 0.6 K/UL
MONOCYTES NFR BLD: 4.4 %
NEUTROPHILS # BLD AUTO: 11 K/UL
NEUTROPHILS NFR BLD: 85.7 %
NITRITE UR QL STRIP: NEGATIVE
NRBC BLD-RTO: 0 /100 WBC
PH UR STRIP: 5 [PH] (ref 5–8)
PHOSPHATE SERPL-MCNC: 2.6 MG/DL
PLATELET # BLD AUTO: 406 K/UL
PMV BLD AUTO: 11 FL
POCT GLUCOSE: 103 MG/DL (ref 70–110)
POCT GLUCOSE: 188 MG/DL (ref 70–110)
POTASSIUM SERPL-SCNC: 4.1 MMOL/L
PROCALCITONIN SERPL IA-MCNC: 0.48 NG/ML
PROT SERPL-MCNC: 7 G/DL
PROT UR QL STRIP: NEGATIVE
PROTHROMBIN TIME: 10.7 SEC
RBC # BLD AUTO: 3.52 M/UL
RBC #/AREA URNS AUTO: 3 /HPF (ref 0–4)
SODIUM SERPL-SCNC: 141 MMOL/L
SP GR UR STRIP: 1.01 (ref 1–1.03)
SPECIMEN SOURCE: NORMAL
SQUAMOUS #/AREA URNS AUTO: 3 /HPF
TROPONIN I SERPL DL<=0.01 NG/ML-MCNC: 0.07 NG/ML
TSH SERPL DL<=0.005 MIU/L-ACNC: 1.21 UIU/ML
URN SPEC COLLECT METH UR: ABNORMAL
UROBILINOGEN UR STRIP-ACNC: NEGATIVE EU/DL
WBC # BLD AUTO: 12.83 K/UL
WBC #/AREA URNS AUTO: 21 /HPF (ref 0–5)

## 2018-02-20 PROCEDURE — 96365 THER/PROPH/DIAG IV INF INIT: CPT | Mod: 59

## 2018-02-20 PROCEDURE — 80053 COMPREHEN METABOLIC PANEL: CPT

## 2018-02-20 PROCEDURE — 83735 ASSAY OF MAGNESIUM: CPT

## 2018-02-20 PROCEDURE — 25000003 PHARM REV CODE 250: Performed by: STUDENT IN AN ORGANIZED HEALTH CARE EDUCATION/TRAINING PROGRAM

## 2018-02-20 PROCEDURE — 84145 PROCALCITONIN (PCT): CPT

## 2018-02-20 PROCEDURE — 82533 TOTAL CORTISOL: CPT

## 2018-02-20 PROCEDURE — 84100 ASSAY OF PHOSPHORUS: CPT

## 2018-02-20 PROCEDURE — 93010 ELECTROCARDIOGRAM REPORT: CPT | Mod: ,,, | Performed by: INTERNAL MEDICINE

## 2018-02-20 PROCEDURE — 96361 HYDRATE IV INFUSION ADD-ON: CPT

## 2018-02-20 PROCEDURE — 63600175 PHARM REV CODE 636 W HCPCS: Performed by: STUDENT IN AN ORGANIZED HEALTH CARE EDUCATION/TRAINING PROGRAM

## 2018-02-20 PROCEDURE — 99285 EMERGENCY DEPT VISIT HI MDM: CPT | Mod: ,,, | Performed by: EMERGENCY MEDICINE

## 2018-02-20 PROCEDURE — 99223 1ST HOSP IP/OBS HIGH 75: CPT | Mod: ,,, | Performed by: INTERNAL MEDICINE

## 2018-02-20 PROCEDURE — 25000003 PHARM REV CODE 250: Performed by: NURSE PRACTITIONER

## 2018-02-20 PROCEDURE — A4216 STERILE WATER/SALINE, 10 ML: HCPCS | Performed by: STUDENT IN AN ORGANIZED HEALTH CARE EDUCATION/TRAINING PROGRAM

## 2018-02-20 PROCEDURE — 85730 THROMBOPLASTIN TIME PARTIAL: CPT

## 2018-02-20 PROCEDURE — 93005 ELECTROCARDIOGRAM TRACING: CPT

## 2018-02-20 PROCEDURE — 96366 THER/PROPH/DIAG IV INF ADDON: CPT | Mod: 59

## 2018-02-20 PROCEDURE — 85610 PROTHROMBIN TIME: CPT

## 2018-02-20 PROCEDURE — 11000001 HC ACUTE MED/SURG PRIVATE ROOM

## 2018-02-20 PROCEDURE — 99285 EMERGENCY DEPT VISIT HI MDM: CPT | Mod: 25

## 2018-02-20 PROCEDURE — 87040 BLOOD CULTURE FOR BACTERIA: CPT | Mod: 59

## 2018-02-20 PROCEDURE — 83880 ASSAY OF NATRIURETIC PEPTIDE: CPT

## 2018-02-20 PROCEDURE — 83605 ASSAY OF LACTIC ACID: CPT

## 2018-02-20 PROCEDURE — 84484 ASSAY OF TROPONIN QUANT: CPT

## 2018-02-20 PROCEDURE — 96360 HYDRATION IV INFUSION INIT: CPT

## 2018-02-20 PROCEDURE — 85025 COMPLETE CBC W/AUTO DIFF WBC: CPT

## 2018-02-20 PROCEDURE — 87086 URINE CULTURE/COLONY COUNT: CPT

## 2018-02-20 PROCEDURE — 87400 INFLUENZA A/B EACH AG IA: CPT | Mod: 59

## 2018-02-20 PROCEDURE — 82962 GLUCOSE BLOOD TEST: CPT

## 2018-02-20 PROCEDURE — 84443 ASSAY THYROID STIM HORMONE: CPT

## 2018-02-20 PROCEDURE — 81001 URINALYSIS AUTO W/SCOPE: CPT

## 2018-02-20 PROCEDURE — 83690 ASSAY OF LIPASE: CPT

## 2018-02-20 RX ORDER — MOXIFLOXACIN HYDROCHLORIDE 400 MG/1
400 TABLET ORAL DAILY
Status: DISCONTINUED | OUTPATIENT
Start: 2018-02-20 | End: 2018-02-20

## 2018-02-20 RX ORDER — MIRTAZAPINE 7.5 MG/1
7.5 TABLET, FILM COATED ORAL NIGHTLY
Status: DISCONTINUED | OUTPATIENT
Start: 2018-02-20 | End: 2018-02-24 | Stop reason: HOSPADM

## 2018-02-20 RX ORDER — ACETAMINOPHEN 325 MG/1
650 TABLET ORAL EVERY 4 HOURS PRN
Status: DISCONTINUED | OUTPATIENT
Start: 2018-02-20 | End: 2018-02-24 | Stop reason: HOSPADM

## 2018-02-20 RX ORDER — ROSUVASTATIN CALCIUM 20 MG/1
20 TABLET, COATED ORAL DAILY
Status: DISCONTINUED | OUTPATIENT
Start: 2018-02-21 | End: 2018-02-24 | Stop reason: HOSPADM

## 2018-02-20 RX ORDER — INSULIN ASPART 100 [IU]/ML
0-5 INJECTION, SOLUTION INTRAVENOUS; SUBCUTANEOUS
Status: DISCONTINUED | OUTPATIENT
Start: 2018-02-20 | End: 2018-02-24 | Stop reason: HOSPADM

## 2018-02-20 RX ORDER — IBUPROFEN 200 MG
24 TABLET ORAL
Status: DISCONTINUED | OUTPATIENT
Start: 2018-02-20 | End: 2018-02-24 | Stop reason: HOSPADM

## 2018-02-20 RX ORDER — GLUCAGON 1 MG
1 KIT INJECTION
Status: DISCONTINUED | OUTPATIENT
Start: 2018-02-20 | End: 2018-02-24 | Stop reason: HOSPADM

## 2018-02-20 RX ORDER — POLYETHYLENE GLYCOL 3350 17 G/17G
17 POWDER, FOR SOLUTION ORAL DAILY
Status: DISCONTINUED | OUTPATIENT
Start: 2018-02-21 | End: 2018-02-24 | Stop reason: HOSPADM

## 2018-02-20 RX ORDER — RAMELTEON 8 MG/1
8 TABLET ORAL NIGHTLY
Status: DISCONTINUED | OUTPATIENT
Start: 2018-02-20 | End: 2018-02-20

## 2018-02-20 RX ORDER — SODIUM CHLORIDE 0.9 % (FLUSH) 0.9 %
5 SYRINGE (ML) INJECTION EVERY 8 HOURS
Status: DISCONTINUED | OUTPATIENT
Start: 2018-02-20 | End: 2018-02-24 | Stop reason: HOSPADM

## 2018-02-20 RX ORDER — AMOXICILLIN 250 MG
1 CAPSULE ORAL 2 TIMES DAILY
Status: DISCONTINUED | OUTPATIENT
Start: 2018-02-20 | End: 2018-02-24 | Stop reason: HOSPADM

## 2018-02-20 RX ORDER — IBUPROFEN 200 MG
16 TABLET ORAL
Status: DISCONTINUED | OUTPATIENT
Start: 2018-02-20 | End: 2018-02-24 | Stop reason: HOSPADM

## 2018-02-20 RX ORDER — NIFEDIPINE 30 MG/1
60 TABLET, EXTENDED RELEASE ORAL DAILY
Status: DISCONTINUED | OUTPATIENT
Start: 2018-02-21 | End: 2018-02-23

## 2018-02-20 RX ORDER — ONDANSETRON 8 MG/1
8 TABLET, ORALLY DISINTEGRATING ORAL EVERY 8 HOURS PRN
Status: DISCONTINUED | OUTPATIENT
Start: 2018-02-20 | End: 2018-02-24 | Stop reason: HOSPADM

## 2018-02-20 RX ORDER — DONEPEZIL HYDROCHLORIDE 5 MG/1
10 TABLET, FILM COATED ORAL DAILY
Status: DISCONTINUED | OUTPATIENT
Start: 2018-02-21 | End: 2018-02-24 | Stop reason: HOSPADM

## 2018-02-20 RX ORDER — RAMELTEON 8 MG/1
8 TABLET ORAL NIGHTLY PRN
Status: DISCONTINUED | OUTPATIENT
Start: 2018-02-20 | End: 2018-02-24 | Stop reason: HOSPADM

## 2018-02-20 RX ORDER — HYDROCODONE BITARTRATE AND ACETAMINOPHEN 5; 325 MG/1; MG/1
1 TABLET ORAL EVERY 4 HOURS PRN
Status: DISCONTINUED | OUTPATIENT
Start: 2018-02-20 | End: 2018-02-24 | Stop reason: HOSPADM

## 2018-02-20 RX ORDER — ENOXAPARIN SODIUM 100 MG/ML
30 INJECTION SUBCUTANEOUS EVERY 24 HOURS
Status: DISCONTINUED | OUTPATIENT
Start: 2018-02-20 | End: 2018-02-24

## 2018-02-20 RX ADMIN — MOXIFLOXACIN HYDROCHLORIDE 400 MG: 400 TABLET, FILM COATED ORAL at 03:02

## 2018-02-20 RX ADMIN — SODIUM CHLORIDE, PRESERVATIVE FREE 5 ML: 5 INJECTION INTRAVENOUS at 10:02

## 2018-02-20 RX ADMIN — PIPERACILLIN AND TAZOBACTAM 4.5 G: 4; .5 INJECTION, POWDER, LYOPHILIZED, FOR SOLUTION INTRAVENOUS; PARENTERAL at 04:02

## 2018-02-20 RX ADMIN — SODIUM CHLORIDE 1224 ML: 0.9 INJECTION, SOLUTION INTRAVENOUS at 12:02

## 2018-02-20 RX ADMIN — STANDARDIZED SENNA CONCENTRATE AND DOCUSATE SODIUM 1 TABLET: 8.6; 5 TABLET, FILM COATED ORAL at 10:02

## 2018-02-20 RX ADMIN — VANCOMYCIN HYDROCHLORIDE 500 MG: 500 INJECTION, POWDER, LYOPHILIZED, FOR SOLUTION INTRAVENOUS at 11:02

## 2018-02-20 RX ADMIN — MIRTAZAPINE 7.5 MG: 7.5 TABLET ORAL at 10:02

## 2018-02-20 NOTE — TELEPHONE ENCOUNTER
----- Message from Chel Hoskins sent at 2/15/2018 12:46 PM CST -----  Contact: [facility]  States pt has really bad buttock wounds and needs to be advised how to handle. [facility] can be reached @ [phone].

## 2018-02-20 NOTE — TELEPHONE ENCOUNTER
----- Message from Kym Quezada sent at 2/19/2018  3:03 PM CST -----  Contact: Sheila Cervantes with [facility]  Speech Pathologists was going to follow for a few visit to see if she tolerated her diet. Seems to have regressed with her swallow function. Asking to extend visit for 4 additional visits for swallowing.  Call back # [phone].

## 2018-02-20 NOTE — SUBJECTIVE & OBJECTIVE
Past Medical History:   Diagnosis Date    Diabetes mellitus     Diabetes mellitus type II     Diabetic neuropathy     Hyperlipidemia     Hypertension     Tuberculosis        Past Surgical History:   Procedure Laterality Date    left lung lobectomy for TB      removed at 20yrs old       Review of patient's allergies indicates:  No Known Allergies    No current facility-administered medications on file prior to encounter.      Current Outpatient Prescriptions on File Prior to Encounter   Medication Sig    blood sugar diagnostic Strp 1 each by Misc.(Non-Drug; Combo Route) route once daily. True Metrix Glucometer Test Strips  Test blood sugar once daily    ciprofloxacin HCl (CIPRO) 500 MG tablet Take 1 tablet (500 mg total) by mouth once daily.    donepezil (ARICEPT) 10 MG tablet Take 1 tablet (10 mg total) by mouth once daily.    lancets Misc 1 each by Misc.(Non-Drug; Combo Route) route once daily. TRUE METRIX METER    metFORMIN (GLUCOPHAGE) 500 MG tablet Take 1 tablet (500 mg total) by mouth 2 (two) times daily with meals.    mirtazapine (REMERON) 7.5 MG Tab Take 1 tablet (7.5 mg total) by mouth every evening.    NIFEdipine (NIFEDICAL XL) 60 MG (OSM) 24 hr tablet Take 1 tablet (60 mg total) by mouth once daily.    rosuvastatin (CRESTOR) 20 MG tablet Take 1 tablet (20 mg total) by mouth once daily.     Family History     Problem Relation (Age of Onset)    Arthritis Brother    Diabetes Mother, Sister, Daughter, Son, Daughter    Hypertension Mother, Daughter, Sister        Social History Main Topics    Smoking status: Former Smoker     Quit date: 1/19/1988    Smokeless tobacco: Never Used    Alcohol use No    Drug use: No    Sexual activity: No     Review of Systems   Unable to perform ROS: Dementia   Constitutional: Negative for chills and fever.   Respiratory: Positive for cough. Negative for choking.    Cardiovascular: Negative for chest pain.   Gastrointestinal: Negative for abdominal distention,  abdominal pain, nausea and vomiting.   Genitourinary: Negative for difficulty urinating and dysuria.   Skin: Positive for wound. Negative for rash.     Objective:     Vital Signs (Most Recent):  Temp: 99 °F (37.2 °C) (02/20/18 1143)  Pulse: 81 (02/20/18 1516)  Resp: (!) 34 (02/20/18 1352)  BP: (!) 129/58 (02/20/18 1516)  SpO2: 97 % (02/20/18 1530) Vital Signs (24h Range):  Temp:  [99 °F (37.2 °C)] 99 °F (37.2 °C)  Pulse:  [] 81  Resp:  [32-44] 34  SpO2:  [85 %-98 %] 97 %  BP: (105-129)/(54-60) 129/58     Weight: 40.8 kg (89 lb 15.2 oz)  Body mass index is 17.57 kg/m².    Physical Exam   Constitutional: No distress.   Cachectic, bed bound   HENT:   Head: Normocephalic and atraumatic.   Mouth/Throat: Oropharynx is clear and moist.   Eyes: Pupils are equal, round, and reactive to light.   Neck: Neck supple. No JVD present.   Cardiovascular: Normal rate, regular rhythm, normal heart sounds and intact distal pulses.    Pulmonary/Chest: No respiratory distress. She has no wheezes. She has rhonchi in the right middle field and the right lower field. She has no rales.   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 Bowel sounds are normal. She exhibits no distension. There is no tenderness.   Neurological: She is alert.   AAOx1   Skin: Skin is warm and dry. Capillary refill takes 2 to 3 seconds. She is not diaphoretic.   Sacral decubitus DTI  Right heel pressure ulcer         CRANIAL NERVES     CN III, IV, VI   Pupils are equal, round, and reactive to light.       Significant Labs:   Recent Results (from the past 24 hour(s))   APTT    Collection Time: 02/20/18 12:05 PM   Result Value Ref Range    aPTT 21.2 21.0 - 32.0 sec   Brain natriuretic peptide    Collection Time: 02/20/18 12:05 PM   Result Value Ref Range    BNP 88 0 - 99 pg/mL   CBC auto differential    Collection Time: 02/20/18 12:05 PM   Result Value Ref Range    WBC 12.83 (H) 3.90 - 12.70 K/uL    RBC 3.52 (L) 4.00 -  5.40 M/uL    Hemoglobin 9.9 (L) 12.0 - 16.0 g/dL    Hematocrit 31.9 (L) 37.0 - 48.5 %    MCV 91 82 - 98 fL    MCH 28.1 27.0 - 31.0 pg    MCHC 31.0 (L) 32.0 - 36.0 g/dL    RDW 14.7 (H) 11.5 - 14.5 %    Platelets 406 (H) 150 - 350 K/uL    MPV 11.0 9.2 - 12.9 fL    Immature Granulocytes 0.6 (H) 0.0 - 0.5 %    Gran # (ANC) 11.0 (H) 1.8 - 7.7 K/uL    Immature Grans (Abs) 0.08 (H) 0.00 - 0.04 K/uL    Lymph # 1.1 1.0 - 4.8 K/uL    Mono # 0.6 0.3 - 1.0 K/uL    Eos # 0.1 0.0 - 0.5 K/uL    Baso # 0.03 0.00 - 0.20 K/uL    nRBC 0 0 /100 WBC    Gran% 85.7 (H) 38.0 - 73.0 %    Lymph% 8.7 (L) 18.0 - 48.0 %    Mono% 4.4 4.0 - 15.0 %    Eosinophil% 0.4 0.0 - 8.0 %    Basophil% 0.2 0.0 - 1.9 %    Differential Method Automated    Comprehensive metabolic panel    Collection Time: 02/20/18 12:05 PM   Result Value Ref Range    Sodium 141 136 - 145 mmol/L    Potassium 4.1 3.5 - 5.1 mmol/L    Chloride 102 95 - 110 mmol/L    CO2 29 23 - 29 mmol/L    Glucose 309 (H) 70 - 110 mg/dL    BUN, Bld 20 8 - 23 mg/dL    Creatinine 1.0 0.5 - 1.4 mg/dL    Calcium 8.9 8.7 - 10.5 mg/dL    Total Protein 7.0 6.0 - 8.4 g/dL    Albumin 1.9 (L) 3.5 - 5.2 g/dL    Total Bilirubin 0.2 0.1 - 1.0 mg/dL    Alkaline Phosphatase 95 55 - 135 U/L    AST 44 (H) 10 - 40 U/L    ALT 42 10 - 44 U/L    Anion Gap 10 8 - 16 mmol/L    eGFR if African American 57.7 (A) >60 mL/min/1.73 m^2    eGFR if non  50.1 (A) >60 mL/min/1.73 m^2   Cortisol    Collection Time: 02/20/18 12:05 PM   Result Value Ref Range    Cortisol 28.0 ug/dL   Lactic acid, plasma #1    Collection Time: 02/20/18 12:05 PM   Result Value Ref Range    Lactate (Lactic Acid) 1.2 0.5 - 2.2 mmol/L   Lipase    Collection Time: 02/20/18 12:05 PM   Result Value Ref Range    Lipase 27 4 - 60 U/L   Magnesium    Collection Time: 02/20/18 12:05 PM   Result Value Ref Range    Magnesium 1.9 1.6 - 2.6 mg/dL   Phosphorus    Collection Time: 02/20/18 12:05 PM   Result Value Ref Range    Phosphorus 2.6 (L) 2.7 -  4.5 mg/dL   Protime-INR    Collection Time: 02/20/18 12:05 PM   Result Value Ref Range    Prothrombin Time 10.7 9.0 - 12.5 sec    INR 1.0 0.8 - 1.2   Procalcitonin    Collection Time: 02/20/18 12:05 PM   Result Value Ref Range    Procalcitonin 0.48 (H) <0.25 ng/mL   Troponin I    Collection Time: 02/20/18 12:05 PM   Result Value Ref Range    Troponin I 0.069 (H) 0.000 - 0.026 ng/mL   TSH    Collection Time: 02/20/18 12:05 PM   Result Value Ref Range    TSH 1.210 0.400 - 4.000 uIU/mL   Influenza antigen Nasal Swab    Collection Time: 02/20/18 12:17 PM   Result Value Ref Range    Influenza A Ag, EIA Negative Negative    Influenza B Ag, EIA Negative Negative    Flu A & B Source Nasal Swab    Urinalysis, Reflex to Urine Culture Urine, Clean Catch    Collection Time: 02/20/18 12:24 PM   Result Value Ref Range    Specimen UA Urine, Catheterized     Color, UA Yellow Yellow, Straw, Marlin    Appearance, UA Hazy (A) Clear    pH, UA 5.0 5.0 - 8.0    Specific Gravity, UA 1.010 1.005 - 1.030    Protein, UA Negative Negative    Glucose, UA 1+ (A) Negative    Ketones, UA Negative Negative    Bilirubin (UA) Negative Negative    Occult Blood UA 1+ (A) Negative    Nitrite, UA Negative Negative    Urobilinogen, UA Negative <2.0 EU/dL    Leukocytes, UA 2+ (A) Negative   Urinalysis Microscopic    Collection Time: 02/20/18 12:24 PM   Result Value Ref Range    RBC, UA 3 0 - 4 /hpf    WBC, UA 21 (H) 0 - 5 /hpf    Bacteria, UA Few (A) None-Occ /hpf    Squam Epithel, UA 3 /hpf    Microscopic Comment SEE COMMENT    POCT glucose    Collection Time: 02/20/18  4:29 PM   Result Value Ref Range    POCT Glucose 188 (H) 70 - 110 mg/dL       Significant Imaging:   Imaging Results          X-Ray Chest AP Portable (Final result)  Result time 02/20/18 13:02:40    Final result by Shay Brown MD (02/20/18 13:02:40)                 Impression:      Grossly stable chronic pulmonary findings, no convincing large focal consolidation.  Interstitial  edema on the right remains.        Electronically signed by: TIEN LEWIS MD  Date:     02/20/18  Time:    13:02              Narrative:    Chest AP portable    Indication:Sepsis    Comparison:12/11/2018    Findings:  The cardiomediastinal silhouette is grossly stable the configuration noting mediastinal shift to the left and calcification of the aortic arch, similar to the previous exam.  There is no pleural effusion.  The trachea is midline.  The lungs are asymmetrically expanded in this left pneumonectomy patient, the right hemithorax is fully expanded. There is coarse interstitial attenuation throughout the right lung, similar to the previous exam. No large focal consolidation seen.  There is no pneumothorax.  The osseous structures are remarkable for degenerative changes, as well as dextroscoliotic curvature of the thoracic spine.                             I have reviewed and interpreted all pertinent imaging results/findings within the past 24 hours.

## 2018-02-20 NOTE — H&P
[facility] Hospital Medicine  History & Physical    Patient Name: Delfina Pelletier  MRN: [id number]  Admission Date: 2/20/2018  Attending Physician: Se Valenzuela MD   Primary Care Provider: Dimitri Castellanos MD    [facility] Medicine Team: [facility] HOSP MED 4 Loreto Westbrook MD     Patient information was obtained from patient, relative(s), caregiver / friend, past medical records and ER records.     Subjective:     Principal Problem:Acute respiratory failure with hypoxia    Chief Complaint:   Chief Complaint   Patient presents with    Shortness of Breath        HPI: 89yo F with dementia, T2DM, history of partial pneumonectomy for TB and recent admit for urosepsis with E. Coli bacteremia, presents on advice of Home Health nurse after she was found to have low O2 sats. Patient herself does not have any complaints. Per daughter (main caregiver) and sister at bedside she has had a non-productive cough over the last few days, no fever or chills. Daughter and sister deny any changes to her mental status (at baseline AAOx1). No chest pain. No abdo pain/ nausea or vomiting. She voids into diaper and daughter says she hasn't noticed any change in urine color or smell when changing her. No dysuria. She was admitted to [facility] from 2/8/2018 to 2/14/2018 with urosepsis with E. Coli bacteremia- received 3 doses of ceftriaxone and discharged on course of ciprofloxacin with Home Health. Daughter says patient has been bed bound since her last admission. During last admission, she was evaluated by SLP who recommended dental soft with nectar thick liquids. Daughter says she has continued on soft diet although she was drinking water at home. Daughter says there have been no signs of cough or regurgitating food/drink. She has sacral and right heel decubitus sores which have remained unchanged since she was discharged.     In ED she was found to have pulse oximetry of 87% on RA, improved to 95% with 2L O2. Her BNP was 88  (lower than on day of discharge on last admit), her procal was 0.46, rapid flu was negative and CXR did not show any convincing focal consolidation. Blood and urine cultures were drawn, she was given a 30 cc/kg bolus of normal saline and started on vanc and pip/tazo.    Past Medical History:   Diagnosis Date    Diabetes mellitus     Diabetes mellitus type II     Diabetic neuropathy     Hyperlipidemia     Hypertension     Tuberculosis        Past Surgical History:   Procedure Laterality Date    left lung lobectomy for TB      removed at 20yrs old       Review of patient's allergies indicates:  No Known Allergies    No current facility-administered medications on file prior to encounter.      Current Outpatient Prescriptions on File Prior to Encounter   Medication Sig    blood sugar diagnostic Strp 1 each by Misc.(Non-Drug; Combo Route) route once daily. True Metrix Glucometer Test Strips  Test blood sugar once daily    ciprofloxacin HCl (CIPRO) 500 MG tablet Take 1 tablet (500 mg total) by mouth once daily.    donepezil (ARICEPT) 10 MG tablet Take 1 tablet (10 mg total) by mouth once daily.    lancets Misc 1 each by Misc.(Non-Drug; Combo Route) route once daily. TRUE METRIX METER    metFORMIN (GLUCOPHAGE) 500 MG tablet Take 1 tablet (500 mg total) by mouth 2 (two) times daily with meals.    mirtazapine (REMERON) 7.5 MG Tab Take 1 tablet (7.5 mg total) by mouth every evening.    NIFEdipine (NIFEDICAL XL) 60 MG (OSM) 24 hr tablet Take 1 tablet (60 mg total) by mouth once daily.    rosuvastatin (CRESTOR) 20 MG tablet Take 1 tablet (20 mg total) by mouth once daily.     Family History     Problem Relation (Age of Onset)    Arthritis Brother    Diabetes Mother, Sister, Daughter, Son, Daughter    Hypertension Mother, Daughter, Sister        Social History Main Topics    Smoking status: Former Smoker     Quit date: 1/19/1988    Smokeless tobacco: Never Used    Alcohol use No    Drug use: No    Sexual  activity: No     Review of Systems   Unable to perform ROS: Dementia   Constitutional: Negative for chills and fever.   Respiratory: Positive for cough. Negative for choking.    Cardiovascular: Negative for chest pain.   Gastrointestinal: Negative for abdominal distention, abdominal pain, nausea and vomiting.   Genitourinary: Negative for difficulty urinating and dysuria.   Skin: Positive for wound. Negative for rash.     Objective:     Vital Signs (Most Recent):  Temp: 99 °F (37.2 °C) (02/20/18 1143)  Pulse: 81 (02/20/18 1516)  Resp: (!) 34 (02/20/18 1352)  BP: (!) 129/58 (02/20/18 1516)  SpO2: 97 % (02/20/18 1530) Vital Signs (24h Range):  Temp:  [99 °F (37.2 °C)] 99 °F (37.2 °C)  Pulse:  [] 81  Resp:  [32-44] 34  SpO2:  [85 %-98 %] 97 %  BP: (105-129)/(54-60) 129/58     Weight: 40.8 kg (89 lb 15.2 oz)  Body mass index is 17.57 kg/m².    Physical Exam   Constitutional: No distress.   Cachectic, bed bound   HENT:   Head: Normocephalic and atraumatic.   Mouth/Throat: Oropharynx is clear and moist.   Eyes: Pupils are equal, round, and reactive to light.   Neck: Neck supple. No JVD present.   Cardiovascular: Normal rate, regular rhythm, normal heart sounds and intact distal pulses.    Pulmonary/Chest: No respiratory distress. She has no wheezes. She has rhonchi in the right middle field and the right lower field. She has no rales.   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 Bowel sounds are normal. She exhibits no distension. There is no tenderness.   Neurological: She is alert.   AAOx1   Skin: Skin is warm and dry. Capillary refill takes 2 to 3 seconds. She is not diaphoretic.   Sacral decubitus DTI  Right heel pressure ulcer         CRANIAL NERVES     CN III, IV, VI   Pupils are equal, round, and reactive to light.       Significant Labs:   Recent Results (from the past 24 hour(s))   APTT    Collection Time: 02/20/18 12:05 PM   Result Value Ref Range    aPTT 21.2  21.0 - 32.0 sec   Brain natriuretic peptide    Collection Time: 02/20/18 12:05 PM   Result Value Ref Range    BNP 88 0 - 99 pg/mL   CBC auto differential    Collection Time: 02/20/18 12:05 PM   Result Value Ref Range    WBC 12.83 (H) 3.90 - 12.70 K/uL    RBC 3.52 (L) 4.00 - 5.40 M/uL    Hemoglobin 9.9 (L) 12.0 - 16.0 g/dL    Hematocrit 31.9 (L) 37.0 - 48.5 %    MCV 91 82 - 98 fL    MCH 28.1 27.0 - 31.0 pg    MCHC 31.0 (L) 32.0 - 36.0 g/dL    RDW 14.7 (H) 11.5 - 14.5 %    Platelets 406 (H) 150 - 350 K/uL    MPV 11.0 9.2 - 12.9 fL    Immature Granulocytes 0.6 (H) 0.0 - 0.5 %    Gran # (ANC) 11.0 (H) 1.8 - 7.7 K/uL    Immature Grans (Abs) 0.08 (H) 0.00 - 0.04 K/uL    Lymph # 1.1 1.0 - 4.8 K/uL    Mono # 0.6 0.3 - 1.0 K/uL    Eos # 0.1 0.0 - 0.5 K/uL    Baso # 0.03 0.00 - 0.20 K/uL    nRBC 0 0 /100 WBC    Gran% 85.7 (H) 38.0 - 73.0 %    Lymph% 8.7 (L) 18.0 - 48.0 %    Mono% 4.4 4.0 - 15.0 %    Eosinophil% 0.4 0.0 - 8.0 %    Basophil% 0.2 0.0 - 1.9 %    Differential Method Automated    Comprehensive metabolic panel    Collection Time: 02/20/18 12:05 PM   Result Value Ref Range    Sodium 141 136 - 145 mmol/L    Potassium 4.1 3.5 - 5.1 mmol/L    Chloride 102 95 - 110 mmol/L    CO2 29 23 - 29 mmol/L    Glucose 309 (H) 70 - 110 mg/dL    BUN, Bld 20 8 - 23 mg/dL    Creatinine 1.0 0.5 - 1.4 mg/dL    Calcium 8.9 8.7 - 10.5 mg/dL    Total Protein 7.0 6.0 - 8.4 g/dL    Albumin 1.9 (L) 3.5 - 5.2 g/dL    Total Bilirubin 0.2 0.1 - 1.0 mg/dL    Alkaline Phosphatase 95 55 - 135 U/L    AST 44 (H) 10 - 40 U/L    ALT 42 10 - 44 U/L    Anion Gap 10 8 - 16 mmol/L    eGFR if African American 57.7 (A) >60 mL/min/1.73 m^2    eGFR if non  50.1 (A) >60 mL/min/1.73 m^2   Cortisol    Collection Time: 02/20/18 12:05 PM   Result Value Ref Range    Cortisol 28.0 ug/dL   Lactic acid, plasma #1    Collection Time: 02/20/18 12:05 PM   Result Value Ref Range    Lactate (Lactic Acid) 1.2 0.5 - 2.2 mmol/L   Lipase    Collection Time:  02/20/18 12:05 PM   Result Value Ref Range    Lipase 27 4 - 60 U/L   Magnesium    Collection Time: 02/20/18 12:05 PM   Result Value Ref Range    Magnesium 1.9 1.6 - 2.6 mg/dL   Phosphorus    Collection Time: 02/20/18 12:05 PM   Result Value Ref Range    Phosphorus 2.6 (L) 2.7 - 4.5 mg/dL   Protime-INR    Collection Time: 02/20/18 12:05 PM   Result Value Ref Range    Prothrombin Time 10.7 9.0 - 12.5 sec    INR 1.0 0.8 - 1.2   Procalcitonin    Collection Time: 02/20/18 12:05 PM   Result Value Ref Range    Procalcitonin 0.48 (H) <0.25 ng/mL   Troponin I    Collection Time: 02/20/18 12:05 PM   Result Value Ref Range    Troponin I 0.069 (H) 0.000 - 0.026 ng/mL   TSH    Collection Time: 02/20/18 12:05 PM   Result Value Ref Range    TSH 1.210 0.400 - 4.000 uIU/mL   Influenza antigen Nasal Swab    Collection Time: 02/20/18 12:17 PM   Result Value Ref Range    Influenza A Ag, EIA Negative Negative    Influenza B Ag, EIA Negative Negative    Flu A & B Source Nasal Swab    Urinalysis, Reflex to Urine Culture Urine, Clean Catch    Collection Time: 02/20/18 12:24 PM   Result Value Ref Range    Specimen UA Urine, Catheterized     Color, UA Yellow Yellow, Straw, Marlin    Appearance, UA Hazy (A) Clear    pH, UA 5.0 5.0 - 8.0    Specific Gravity, UA 1.010 1.005 - 1.030    Protein, UA Negative Negative    Glucose, UA 1+ (A) Negative    Ketones, UA Negative Negative    Bilirubin (UA) Negative Negative    Occult Blood UA 1+ (A) Negative    Nitrite, UA Negative Negative    Urobilinogen, UA Negative <2.0 EU/dL    Leukocytes, UA 2+ (A) Negative   Urinalysis Microscopic    Collection Time: 02/20/18 12:24 PM   Result Value Ref Range    RBC, UA 3 0 - 4 /hpf    WBC, UA 21 (H) 0 - 5 /hpf    Bacteria, UA Few (A) None-Occ /hpf    Squam Epithel, UA 3 /hpf    Microscopic Comment SEE COMMENT    POCT glucose    Collection Time: 02/20/18  4:29 PM   Result Value Ref Range    POCT Glucose 188 (H) 70 - 110 mg/dL       Significant Imaging:   Imaging  Results          X-Ray Chest AP Portable (Final result)  Result time 02/20/18 13:02:40    Final result by Tien Brown MD (02/20/18 13:02:40)                 Impression:      Grossly stable chronic pulmonary findings, no convincing large focal consolidation.  Interstitial edema on the right remains.        Electronically signed by: TIEN BROWN MD  Date:     02/20/18  Time:    13:02              Narrative:    Chest AP portable    Indication:Sepsis    Comparison:12/11/2018    Findings:  The cardiomediastinal silhouette is grossly stable the configuration noting mediastinal shift to the left and calcification of the aortic arch, similar to the previous exam.  There is no pleural effusion.  The trachea is midline.  The lungs are asymmetrically expanded in this left pneumonectomy patient, the right hemithorax is fully expanded. There is coarse interstitial attenuation throughout the right lung, similar to the previous exam. No large focal consolidation seen.  There is no pneumothorax.  The osseous structures are remarkable for degenerative changes, as well as dextroscoliotic curvature of the thoracic spine.                             I have reviewed and interpreted all pertinent imaging results/findings within the past 24 hours.    Assessment/Plan:     * Acute respiratory failure with hypoxia    Acute hypoxic respiratory failure in the setting of left pneumonectomy.  - Recently discharged from [facility] after 6 day admission  - At risk for aspiration based on last SLP eval but was cleared for dental soft with nectar thick (daughter says was compliant with soft diet but had been drinking water)  - Rapid flu negative  - Afebrile, WCC 12  - Procal 0.46  - DDx HAP vs aspiration PNA    Plan  - Admit to IM4   - Supplemental O2 as required to keep sats >92%  - Blood and urine cultures drawn   - Send respiratory viral panel  - Already received sepsis bolus in ED,   - D/w ID, start empiric vanc and pip/tazo  - Soft diet,  nectar thick, strict aspiration precautions  - PT/OT/SLP  - Daughter states patient is DNR/DNI        Dementia without behavioral disturbance    - Continue home donepezil 10mg qd and mirtazapine 7.5mg qhs  - Delirium precautions        Alteration in skin integrity    - Wound care consulted, appreciate recs  - Q2 turns  - Heel boot protectors        History of diabetes mellitus    A1c 2/8/2018 = 5.4   - On metformin at home  - POCT glucose checks AC & HS  - LDSSI for now        Essential hypertension    - Continue home nifedipine 60mg qd        Severe protein-calorie malnutrition    - Mirtazapine 7.5mg qhs        Hyperlipidemia    - Continue rosuvastatin 20mg          VTE Risk Mitigation         Ordered     enoxaparin injection 30 mg  Daily     Route:  Subcutaneous        02/20/18 1559     Medium Risk of VTE  Once      02/20/18 1600             Loreto Westbrook MD  Department of Hospital Medicine   [facility]

## 2018-02-20 NOTE — ASSESSMENT & PLAN NOTE
Acute hypoxic respiratory failure in the setting of left pneumonectomy.  - [place] [facility] [place]  - At risk for aspiration based on last SLP eval but was cleared for dental soft with nectar thick (daughter says was compliant with soft diet but had been drinking water)  - Rapid flu negative  - Afebrile, WCC 12  - Procal 0.46  - DDx HAP vs aspiration PNA    Plan  - Admit to IM4   - Supplemental O2 as required to keep sats >92%  - Blood and urine cultures drawn   - Send respiratory viral panel  - Already received sepsis bolus in ED,   - D/w ID, start empiric vanc and pip/tazo  - Soft diet, nectar thick, strict aspiration precautions  - PT/OT/SLP  - Daughter states patient is DNR/DNI

## 2018-02-20 NOTE — HPI
89yo F with dementia, T2DM, history of partial pneumonectomy for TB and recent admit for urosepsis with E. Coli bacteremia, presents on advice of Home Health nurse after she was found to have low O2 sats. Patient herself does not have any complaints. Per daughter (main caregiver) and sister at bedside she has had a non-productive cough over the last few days, no fever or chills. Daughter and sister deny any changes to her mental status (at baseline AAOx1). No chest pain. No abdo pain/ nausea or vomiting. She voids into diaper and daughter says she hasn't noticed any change in urine color or smell when changing her. No dysuria. [place] [facility] [place] 2/14/2018 with urosepsis with E. Coli bacteremia- received 3 doses of ceftriaxone and discharged on course of ciprofloxacin with Home Health. Daughter says patient has been bed bound since her last admission. During last admission, she was evaluated by SLP who recommended dental soft with nectar thick liquids. Daughter says she has continued on soft diet although she was drinking water at home. Daughter says there have been no signs of cough or regurgitating food/drink. She has sacral and right heel decubitus sores which have remained unchanged since she was discharged.     In ED she was found to have pulse oximetry of 87% on RA, improved to 95% with 2L O2. Her BNP was 88 (lower than on day of discharge on last admit), her procal was 0.46, rapid flu was negative and CXR did not show any convincing focal consolidation. Blood and urine cultures were drawn, she was given a 30 cc/kg bolus of normal saline and started on vanc and pip/tazo.

## 2018-02-20 NOTE — HOSPITAL COURSE
02/20/2018: Admitted to IM4. Started vanc & pipt/tazo for presumed PNA (HAP vs aspiration).  02/21/2018: Patient with noted hypoxia upon check in the morning (SpO2 in 70-80s) on 2L nasal cannula. Patient was put on non-rebreather with resolution of low O2 saturations. Of note, patient with noted hypoxemia on ABG with pO2 of 43. She otherwise was in no respiratory distress and looked comfortable without use of accessory muscles. Patient remained afebrile without worsening cough. D-dimer elevated but US BLE was negative for DVT, suspect D-dimer elevation related to infection.  02/22/2018: JABARI. Afebrile. Weaned to 4L NC after furosemide, continuing to wean O2. Decompensation yesterday likely related to sepsis bolus.   02/23/2018: JABARI. Still requiring 4L O2, qualified for home O2 yesterday. Will check echo today as had not one before.   02/24/2018: JABARI. Weaned to 3L O2. Echo showing pulmonary hypertension. Will discharge home today, continue with home health. Complete 7 day course of antibiotics for CAP (last dose on Monday 2/26/2018). Follow up with PCP as planned on Tuesday.

## 2018-02-20 NOTE — TELEPHONE ENCOUNTER
[facility] requesting to extend visit for 4 additional visits for swallowing. Speech pathologist will follow with visit  To see if patient is tolerating diets. Please advise.

## 2018-02-20 NOTE — ED TRIAGE NOTES
Patient has been sick for a couple days and started having SOB this morning.  Home health nurse noticed the patient was SOB.  Patient has a low grade fever and has been treated for UTI.

## 2018-02-20 NOTE — ED PROVIDER NOTES
"Encounter Date: 2/20/2018    SCRIBE #1 NOTE: I, Julia Mcduffie, am scribing for, and in the presence of,  Lashell Carlin MD. I have scribed the following portions of the note - the APC attestation.       History     Chief Complaint   Patient presents with    Shortness of Breath     Pt is a 89 y.o. female with co-morbidities including: HTN, HLD, DM I, and Alzheimers disease  who presents to the ED with a complaint of shortness of breath.  Pt family states that home health came this morning and stated that pt had increased SOB and fever. Pt had a recent admission to the hospital for UTI and was discharged home on 2/14/18.  Daughter states since this time pt developed a nonproductive cough, "and she sounds like she has a cold when she breaths.  Per family, pt has no c/o chest pain, abdominal pain, N/V/D, or chills.            Review of patient's allergies indicates:  No Known Allergies  Past Medical History:   Diagnosis Date    Diabetes mellitus     Diabetes mellitus type II     Diabetic neuropathy     Hyperlipidemia     Hypertension     Tuberculosis      Past Surgical History:   Procedure Laterality Date    left lung lobectomy for TB      removed at 20yrs old     Family History   Problem Relation Age of Onset    Hypertension Mother     Diabetes Mother     Diabetes Sister     Arthritis Brother     Hypertension Daughter     Diabetes Daughter     Diabetes Son     Hypertension Sister     Diabetes Daughter     Blindness Neg Hx     Glaucoma Neg Hx     Macular degeneration Neg Hx     Retinal detachment Neg Hx      Social History   Substance Use Topics    Smoking status: Former Smoker     Quit date: 1/19/1988    Smokeless tobacco: Never Used    Alcohol use No     Review of Systems   Unable to perform ROS: Mental status change       Physical Exam     Initial Vitals [02/20/18 1143]   BP Pulse Resp Temp SpO2   (!) 129/55 105 (!) 44 99 °F (37.2 °C) (!) 90 %      MAP       79.67         Physical " Exam    Constitutional: She appears lethargic. She is not diaphoretic. She is cooperative. She has a sickly appearance. She appears ill. No distress. Nasal cannula in place.   HENT:   Head: Normocephalic and atraumatic.   Nose: Nose normal.   Mouth/Throat: Uvula is midline. Mucous membranes are pale and dry. Posterior oropharyngeal erythema present.   Eyes: Conjunctivae, EOM and lids are normal. Pupils are equal, round, and reactive to light.   Neck: Trachea normal, normal range of motion, full passive range of motion without pain and phonation normal. Neck supple.   Cardiovascular: Regular rhythm and normal pulses. Tachycardia present.    Pulses:       Radial pulses are 2+ on the right side, and 2+ on the left side.        Posterior tibial pulses are 2+ on the right side, and 2+ on the left side.   Pulmonary/Chest: Tachypnea noted. She has rhonchi in the right upper field, the right middle field, the right lower field, the left upper field, the left middle field and the left lower field.   Abdominal: Soft. Normal appearance and bowel sounds are normal. There is no tenderness. There is no rigidity, no rebound and no guarding.   Neurological: She appears lethargic.   Skin: Skin is dry and intact. Capillary refill takes 2 to 3 seconds. Ecchymosis noted. No rash noted. No cyanosis. There is pallor. Nails show no clubbing.   Scattered ecchymosis noted.          ED Course   Procedures  Labs Reviewed   CBC W/ AUTO DIFFERENTIAL - Abnormal; Notable for the following:        Result Value    WBC 12.83 (*)     RBC 3.52 (*)     Hemoglobin 9.9 (*)     Hematocrit 31.9 (*)     MCHC 31.0 (*)     RDW 14.7 (*)     Platelets 406 (*)     Immature Granulocytes 0.6 (*)     Gran # (ANC) 11.0 (*)     Immature Grans (Abs) 0.08 (*)     Gran% 85.7 (*)     Lymph% 8.7 (*)     All other components within normal limits    Narrative:     red extra used for pcal   COMPREHENSIVE METABOLIC PANEL - Abnormal; Notable for the following:     Glucose 309  (*)     Albumin 1.9 (*)     AST 44 (*)     eGFR if  57.7 (*)     eGFR if non  50.1 (*)     All other components within normal limits    Narrative:     red extra used for pcal   PHOSPHORUS - Abnormal; Notable for the following:     Phosphorus 2.6 (*)     All other components within normal limits    Narrative:     red extra used for pcal   PROCALCITONIN - Abnormal; Notable for the following:     Procalcitonin 0.48 (*)     All other components within normal limits    Narrative:     red extra used for pcal   TROPONIN I - Abnormal; Notable for the following:     Troponin I 0.069 (*)     All other components within normal limits    Narrative:     red extra used for pcal   URINALYSIS, REFLEX TO URINE CULTURE - Abnormal; Notable for the following:     Appearance, UA Hazy (*)     Glucose, UA 1+ (*)     Occult Blood UA 1+ (*)     Leukocytes, UA 2+ (*)     All other components within normal limits    Narrative:     Preferred Collection Type->Urine, Clean Catch   URINALYSIS MICROSCOPIC - Abnormal; Notable for the following:     WBC, UA 21 (*)     Bacteria, UA Few (*)     All other components within normal limits    Narrative:     Preferred Collection Type->Urine, Clean Catch   POCT GLUCOSE - Abnormal; Notable for the following:     POCT Glucose 188 (*)     All other components within normal limits   CULTURE, BLOOD   CULTURE, BLOOD   CULTURE, URINE   RESPIRATORY VIRAL PANEL BY PCR   APTT    Narrative:     red extra used for pcal   B-TYPE NATRIURETIC PEPTIDE    Narrative:     red extra used for pcal   CORTISOL, RANDOM    Narrative:     red extra used for pcal   LACTIC ACID, PLASMA    Narrative:     red extra used for pcal   LIPASE    Narrative:     red extra used for pcal   MAGNESIUM    Narrative:     red extra used for pcal   PROTIME-INR    Narrative:     red extra used for pcal   TSH    Narrative:     red extra used for pcal   INFLUENZA A AND B ANTIGEN   POCT GLUCOSE MONITORING CONTINUOUS         Imaging Results          X-Ray Chest AP Portable (Final result)  Result time 02/20/18 13:02:40    Final result by Tien Brown MD (02/20/18 13:02:40)                 Impression:      Grossly stable chronic pulmonary findings, no convincing large focal consolidation.  Interstitial edema on the right remains.        Electronically signed by: TIEN BROWN MD  Date:     02/20/18  Time:    13:02              Narrative:    Chest AP portable    Indication:Sepsis    Comparison:12/11/2018    Findings:  The cardiomediastinal silhouette is grossly stable the configuration noting mediastinal shift to the left and calcification of the aortic arch, similar to the previous exam.  There is no pleural effusion.  The trachea is midline.  The lungs are asymmetrically expanded in this left pneumonectomy patient, the right hemithorax is fully expanded. There is coarse interstitial attenuation throughout the right lung, similar to the previous exam. No large focal consolidation seen.  There is no pneumothorax.  The osseous structures are remarkable for degenerative changes, as well as dextroscoliotic curvature of the thoracic spine.                               Medical Decision Making:   History:   Old Medical Records: I decided to obtain old medical records.  Initial Assessment:   Pt is a 89 y.o. female with co-morbidities including: HTN, HLD, DM I, and Alzheimers disease  who presents to the ED with a complaint of shortness of breath. Bilateral rhonchi noted.  Sepsis protocol initiated.    Differential Diagnosis:   UTI, urosepsis, pneumonia, dehydration,   Clinical Tests:   Lab Tests: Ordered and Reviewed  The following lab test(s) were unremarkable: CBC and CMP  Radiological Study: Ordered and Reviewed  Medical Tests: Ordered and Reviewed  ED Management:  Code status discussed with family as per last admission pt was changed to DNR.  Daughter at bedside states she does not believe her mother would want chest compressions or  a breathing tube.         APC / Resident Notes:   1250- DNR status discussed with family.  Paperwork completed.    1450- Pt oxygen saturations dropped to 87% on room air.    Pt to be admitted to Medicine due to new oxygen need.  Family aware of admission.        Scribe Attestation:   Scribe #1: I performed the above scribed service and the documentation accurately describes the services I performed. I attest to the accuracy of the note.    Attending Attestation:     Physician Attestation Statement for NP/PA:   I have conducted a face to face encounter with this patient in addition to the NP/PA, due to Medical Complexity    Other NP/PA Attestation Additions:      Medical Decision Makin y.o. female brought in by family for evaluation of respiratory distress, tachypnea, and increased work of breathing. Patient was recently discharged after admission for treatment of a UTI. She is occasional verbal at baseline. The patient was evaluated immediately on arrival out of concern for life threatening condition. Initial vitals on presentation were 90% O2 saturation on non-rebreather, RR of 44 with mild tachycardia. Differential diagnosis includes aspiration PNA, acute bronchitis, Lobar PNA, acute hypoxia respiratory failure.     Labs today are significant for mild leukocytosis. UA shows 21 WBCs in urine with few bacteria. CXR shows no large consolidation. The patient was weaned off of non-rebreather and de-stated to 87% on room air. Will admit the patient for further treatment.        Physician Attestation for Scribe:      Comments: I, Dr. Lashell Carlin, personally performed the services described in this documentation. All medical record entries made by the scribe were at my direction and in my presence.  I have reviewed the chart and agree that the record reflects my personal performance and is accurate and complete. Lashell Carlin MD.                 Clinical Impression:   The encounter diagnosis was  Hypoxia.    Disposition:   Disposition: Admitted  Condition: Stable                        Luna Rojas NP  02/20/18 6270       Lashell Carlin MD  02/20/18 8939

## 2018-02-20 NOTE — TELEPHONE ENCOUNTER
Spoke to [facility]  nurse, requesting wound care orders for patient. States patient has a bad unstageable wound to buttocks area.states cleansing with wound  and covering is not helping. please advise. OV 2/23/18.

## 2018-02-20 NOTE — TELEPHONE ENCOUNTER
Usually the wound care nurses have recommendations about specific wound care practices - if the wound care nurse has specific orders that I need to OK, then that would be fine.  Otherwise if they need specific recommendations from me, may be better to have pt see wound care clinic when she is discharged.

## 2018-02-21 LAB
ALBUMIN SERPL BCP-MCNC: 1.8 G/DL
ALLENS TEST: ABNORMAL
ALP SERPL-CCNC: 88 U/L
ALT SERPL W/O P-5'-P-CCNC: 33 U/L
ANION GAP SERPL CALC-SCNC: 12 MMOL/L
AST SERPL-CCNC: 41 U/L
BASOPHILS # BLD AUTO: 0.03 K/UL
BASOPHILS NFR BLD: 0.2 %
BILIRUB SERPL-MCNC: 0.3 MG/DL
BUN SERPL-MCNC: 16 MG/DL
CALCIUM SERPL-MCNC: 8.4 MG/DL
CHLORIDE SERPL-SCNC: 105 MMOL/L
CO2 SERPL-SCNC: 25 MMOL/L
CREAT SERPL-MCNC: 0.8 MG/DL
D DIMER PPP IA.FEU-MCNC: 10.7 MG/L FEU
DELSYS: ABNORMAL
DIFFERENTIAL METHOD: ABNORMAL
EOSINOPHIL # BLD AUTO: 0.1 K/UL
EOSINOPHIL NFR BLD: 0.6 %
ERYTHROCYTE [DISTWIDTH] IN BLOOD BY AUTOMATED COUNT: 14.7 %
EST. GFR  (AFRICAN AMERICAN): >60 ML/MIN/1.73 M^2
EST. GFR  (NON AFRICAN AMERICAN): >60 ML/MIN/1.73 M^2
FIO2: 100
FLOW: 15
GLUCOSE SERPL-MCNC: 123 MG/DL
HCO3 UR-SCNC: 31.1 MMOL/L (ref 24–28)
HCT VFR BLD AUTO: 31.5 %
HGB BLD-MCNC: 9.3 G/DL
IMM GRANULOCYTES # BLD AUTO: 0.08 K/UL
IMM GRANULOCYTES NFR BLD AUTO: 0.6 %
LYMPHOCYTES # BLD AUTO: 1.4 K/UL
LYMPHOCYTES NFR BLD: 10.6 %
MAGNESIUM SERPL-MCNC: 2.2 MG/DL
MCH RBC QN AUTO: 27.9 PG
MCHC RBC AUTO-ENTMCNC: 29.5 G/DL
MCV RBC AUTO: 95 FL
MODE: ABNORMAL
MONOCYTES # BLD AUTO: 1 K/UL
MONOCYTES NFR BLD: 7.3 %
NEUTROPHILS # BLD AUTO: 10.6 K/UL
NEUTROPHILS NFR BLD: 80.7 %
NRBC BLD-RTO: 0 /100 WBC
PCO2 BLDA: 45.2 MMHG (ref 35–45)
PH SMN: 7.45 [PH] (ref 7.35–7.45)
PHOSPHATE SERPL-MCNC: 3.6 MG/DL
PLATELET # BLD AUTO: 316 K/UL
PMV BLD AUTO: 10.8 FL
PO2 BLDA: 43 MMHG (ref 80–100)
POC BE: 7 MMOL/L
POC SATURATED O2: 80 % (ref 95–100)
POC TCO2: 32 MMOL/L (ref 23–27)
POCT GLUCOSE: 181 MG/DL (ref 70–110)
POCT GLUCOSE: 209 MG/DL (ref 70–110)
POTASSIUM SERPL-SCNC: 4.1 MMOL/L
PROT SERPL-MCNC: 6.7 G/DL
RBC # BLD AUTO: 3.33 M/UL
SAMPLE: ABNORMAL
SITE: ABNORMAL
SODIUM SERPL-SCNC: 142 MMOL/L
SP02: 88
WBC # BLD AUTO: 13.16 K/UL

## 2018-02-21 PROCEDURE — 63600175 PHARM REV CODE 636 W HCPCS: Performed by: STUDENT IN AN ORGANIZED HEALTH CARE EDUCATION/TRAINING PROGRAM

## 2018-02-21 PROCEDURE — 97535 SELF CARE MNGMENT TRAINING: CPT

## 2018-02-21 PROCEDURE — G8997 SWALLOW GOAL STATUS: HCPCS | Mod: CJ

## 2018-02-21 PROCEDURE — 83735 ASSAY OF MAGNESIUM: CPT

## 2018-02-21 PROCEDURE — 25000003 PHARM REV CODE 250: Performed by: STUDENT IN AN ORGANIZED HEALTH CARE EDUCATION/TRAINING PROGRAM

## 2018-02-21 PROCEDURE — 36415 COLL VENOUS BLD VENIPUNCTURE: CPT

## 2018-02-21 PROCEDURE — 97530 THERAPEUTIC ACTIVITIES: CPT

## 2018-02-21 PROCEDURE — 11000001 HC ACUTE MED/SURG PRIVATE ROOM

## 2018-02-21 PROCEDURE — 97162 PT EVAL MOD COMPLEX 30 MIN: CPT

## 2018-02-21 PROCEDURE — 85025 COMPLETE CBC W/AUTO DIFF WBC: CPT

## 2018-02-21 PROCEDURE — A4216 STERILE WATER/SALINE, 10 ML: HCPCS | Performed by: STUDENT IN AN ORGANIZED HEALTH CARE EDUCATION/TRAINING PROGRAM

## 2018-02-21 PROCEDURE — 92610 EVALUATE SWALLOWING FUNCTION: CPT

## 2018-02-21 PROCEDURE — 97165 OT EVAL LOW COMPLEX 30 MIN: CPT

## 2018-02-21 PROCEDURE — 82803 BLOOD GASES ANY COMBINATION: CPT

## 2018-02-21 PROCEDURE — 99233 SBSQ HOSP IP/OBS HIGH 50: CPT | Mod: ,,, | Performed by: INTERNAL MEDICINE

## 2018-02-21 PROCEDURE — 85379 FIBRIN DEGRADATION QUANT: CPT

## 2018-02-21 PROCEDURE — 36600 WITHDRAWAL OF ARTERIAL BLOOD: CPT

## 2018-02-21 PROCEDURE — G8996 SWALLOW CURRENT STATUS: HCPCS | Mod: CJ

## 2018-02-21 PROCEDURE — 84100 ASSAY OF PHOSPHORUS: CPT

## 2018-02-21 PROCEDURE — 80053 COMPREHEN METABOLIC PANEL: CPT

## 2018-02-21 RX ORDER — FUROSEMIDE 10 MG/ML
20 INJECTION INTRAMUSCULAR; INTRAVENOUS 2 TIMES DAILY
Status: COMPLETED | OUTPATIENT
Start: 2018-02-21 | End: 2018-02-22

## 2018-02-21 RX ORDER — FUROSEMIDE 10 MG/ML
40 INJECTION INTRAMUSCULAR; INTRAVENOUS 2 TIMES DAILY
Status: DISCONTINUED | OUTPATIENT
Start: 2018-02-21 | End: 2018-02-21

## 2018-02-21 RX ORDER — FUROSEMIDE 10 MG/ML
20 INJECTION INTRAMUSCULAR; INTRAVENOUS ONCE
Status: COMPLETED | OUTPATIENT
Start: 2018-02-21 | End: 2018-02-21

## 2018-02-21 RX ORDER — FUROSEMIDE 10 MG/ML
20 INJECTION INTRAMUSCULAR; INTRAVENOUS 2 TIMES DAILY
Status: DISCONTINUED | OUTPATIENT
Start: 2018-02-21 | End: 2018-02-21

## 2018-02-21 RX ADMIN — SODIUM CHLORIDE, PRESERVATIVE FREE 5 ML: 5 INJECTION INTRAVENOUS at 09:02

## 2018-02-21 RX ADMIN — PIPERACILLIN AND TAZOBACTAM 4.5 G: 4; .5 INJECTION, POWDER, LYOPHILIZED, FOR SOLUTION INTRAVENOUS; PARENTERAL at 05:02

## 2018-02-21 RX ADMIN — NIFEDIPINE 60 MG: 30 TABLET, FILM COATED, EXTENDED RELEASE ORAL at 11:02

## 2018-02-21 RX ADMIN — VANCOMYCIN HYDROCHLORIDE 500 MG: 500 INJECTION, POWDER, LYOPHILIZED, FOR SOLUTION INTRAVENOUS at 12:02

## 2018-02-21 RX ADMIN — STANDARDIZED SENNA CONCENTRATE AND DOCUSATE SODIUM 1 TABLET: 8.6; 5 TABLET, FILM COATED ORAL at 11:02

## 2018-02-21 RX ADMIN — VANCOMYCIN HYDROCHLORIDE 500 MG: 500 INJECTION, POWDER, LYOPHILIZED, FOR SOLUTION INTRAVENOUS at 09:02

## 2018-02-21 RX ADMIN — FUROSEMIDE 20 MG: 10 INJECTION, SOLUTION INTRAMUSCULAR; INTRAVENOUS at 09:02

## 2018-02-21 RX ADMIN — SODIUM CHLORIDE, PRESERVATIVE FREE 5 ML: 5 INJECTION INTRAVENOUS at 02:02

## 2018-02-21 RX ADMIN — INSULIN ASPART 2 UNITS: 100 INJECTION, SOLUTION INTRAVENOUS; SUBCUTANEOUS at 12:02

## 2018-02-21 RX ADMIN — STANDARDIZED SENNA CONCENTRATE AND DOCUSATE SODIUM 1 TABLET: 8.6; 5 TABLET, FILM COATED ORAL at 09:02

## 2018-02-21 RX ADMIN — POLYETHYLENE GLYCOL 3350 17 G: 17 POWDER, FOR SOLUTION ORAL at 11:02

## 2018-02-21 RX ADMIN — DONEPEZIL HYDROCHLORIDE 10 MG: 5 TABLET, FILM COATED ORAL at 11:02

## 2018-02-21 RX ADMIN — FUROSEMIDE 20 MG: 10 INJECTION, SOLUTION INTRAMUSCULAR; INTRAVENOUS at 12:02

## 2018-02-21 RX ADMIN — ROSUVASTATIN CALCIUM 20 MG: 20 TABLET, FILM COATED ORAL at 11:02

## 2018-02-21 RX ADMIN — PIPERACILLIN AND TAZOBACTAM 4.5 G: 4; .5 INJECTION, POWDER, LYOPHILIZED, FOR SOLUTION INTRAVENOUS; PARENTERAL at 12:02

## 2018-02-21 RX ADMIN — ENOXAPARIN SODIUM 30 MG: 100 INJECTION SUBCUTANEOUS at 05:02

## 2018-02-21 RX ADMIN — MIRTAZAPINE 7.5 MG: 7.5 TABLET ORAL at 09:02

## 2018-02-21 NOTE — SUBJECTIVE & OBJECTIVE
Past Medical History:   Diagnosis Date    Diabetes mellitus     Diabetes mellitus type II     Diabetic neuropathy     Hyperlipidemia     Hypertension     Tuberculosis        Past Surgical History:   Procedure Laterality Date    left lung lobectomy for TB      removed at 20yrs old       Review of patient's allergies indicates:  No Known Allergies    No current facility-administered medications on file prior to encounter.      Current Outpatient Prescriptions on File Prior to Encounter   Medication Sig    blood sugar diagnostic Strp 1 each by Misc.(Non-Drug; Combo Route) route once daily. True Metrix Glucometer Test Strips  Test blood sugar once daily    ciprofloxacin HCl (CIPRO) 500 MG tablet Take 1 tablet (500 mg total) by mouth once daily.    donepezil (ARICEPT) 10 MG tablet Take 1 tablet (10 mg total) by mouth once daily.    lancets Misc 1 each by Misc.(Non-Drug; Combo Route) route once daily. TRUE METRIX METER    metFORMIN (GLUCOPHAGE) 500 MG tablet Take 1 tablet (500 mg total) by mouth 2 (two) times daily with meals.    mirtazapine (REMERON) 7.5 MG Tab Take 1 tablet (7.5 mg total) by mouth every evening.    NIFEdipine (NIFEDICAL XL) 60 MG (OSM) 24 hr tablet Take 1 tablet (60 mg total) by mouth once daily.    rosuvastatin (CRESTOR) 20 MG tablet Take 1 tablet (20 mg total) by mouth once daily.     Family History     Problem Relation (Age of Onset)    Arthritis Brother    Diabetes Mother, Sister, Daughter, Son, Daughter    Hypertension Mother, Daughter, Sister        Social History Main Topics    Smoking status: Former Smoker     Quit date: 1/19/1988    Smokeless tobacco: Never Used    Alcohol use No    Drug use: No    Sexual activity: No     Review of Systems   Unable to perform ROS: Dementia   Constitutional: Negative for chills and fever.   Respiratory: Positive for cough. Negative for choking.    Cardiovascular: Negative for chest pain.   Gastrointestinal: Negative for abdominal distention,  abdominal pain, nausea and vomiting.   Genitourinary: Negative for difficulty urinating and dysuria.   Skin: Positive for wound. Negative for rash.   Neurological: Positive for weakness.   Psychiatric/Behavioral: Positive for decreased concentration.     Objective:     Vital Signs (Most Recent):  Temp: 96.5 °F (35.8 °C) (02/21/18 1130)  Pulse: 85 (02/21/18 1239)  Resp: (!) 24 (02/21/18 1239)  BP: (!) 151/63 (02/21/18 1130)  SpO2: 99 % (02/21/18 1239) Vital Signs (24h Range):  Temp:  [96.5 °F (35.8 °C)-97.8 °F (36.6 °C)] 96.5 °F (35.8 °C)  Pulse:  [71-85] 85  Resp:  [16-39] 24  SpO2:  [58 %-100 %] 99 %  BP: (111-152)/(57-68) 151/63     Weight: 47.5 kg (104 lb 11.5 oz)  Body mass index is 17.97 kg/m².    Physical Exam   Constitutional: No distress.   Cachectic, bed bound   HENT:   Head: Normocephalic and atraumatic.   Mouth/Throat: Oropharynx is clear and moist.   Eyes: Pupils are equal, round, and reactive to light.   Neck: Neck supple. No JVD present.   Cardiovascular: Normal rate, regular rhythm, normal heart sounds and intact distal pulses.    Pulmonary/Chest: No respiratory distress. She has no wheezes. She has rhonchi in the right middle field and the right lower field. She has no rales.   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 Bowel sounds are normal. She exhibits no distension. There is no tenderness.   Neurological: She is alert.   AAOx1   Skin: Skin is warm and dry. Capillary refill takes 2 to 3 seconds. She is not diaphoretic.   Sacral decubitus DTI  Right heel pressure ulcer         CRANIAL NERVES     CN III, IV, VI   Pupils are equal, round, and reactive to light.       Significant Labs:   CBC:   Recent Labs  Lab 02/20/18  1205 02/21/18  0414   WBC 12.83* 13.16*   HGB 9.9* 9.3*   HCT 31.9* 31.5*   * 316     CMP:   Recent Labs  Lab 02/20/18  1205 02/21/18  0414    142   K 4.1 4.1    105   CO2 29 25   * 123*   BUN 20 16   CREATININE  1.0 0.8   CALCIUM 8.9 8.4*   PROT 7.0 6.7   ALBUMIN 1.9* 1.8*   BILITOT 0.2 0.3   ALKPHOS 95 88   AST 44* 41*   ALT 42 33   ANIONGAP 10 12   EGFRNONAA 50.1* >60.0       Significant Imaging:   Imaging Results          X-Ray Chest AP Portable (Final result)  Result time 02/20/18 13:02:40    Final result by Tien Brown MD (02/20/18 13:02:40)                 Impression:      Grossly stable chronic pulmonary findings, no convincing large focal consolidation.  Interstitial edema on the right remains.        Electronically signed by: TIEN BROWN MD  Date:     02/20/18  Time:    13:02              Narrative:    Chest AP portable    Indication:Sepsis    Comparison:12/11/2018    Findings:  The cardiomediastinal silhouette is grossly stable the configuration noting mediastinal shift to the left and calcification of the aortic arch, similar to the previous exam.  There is no pleural effusion.  The trachea is midline.  The lungs are asymmetrically expanded in this left pneumonectomy patient, the right hemithorax is fully expanded. There is coarse interstitial attenuation throughout the right lung, similar to the previous exam. No large focal consolidation seen.  There is no pneumothorax.  The osseous structures are remarkable for degenerative changes, as well as dextroscoliotic curvature of the thoracic spine.

## 2018-02-21 NOTE — ASSESSMENT & PLAN NOTE
Acute hypoxic respiratory failure in the setting of left pneumonectomy.  - Recently discharged from [facility] after 6 day admission  - At risk for aspiration based on last SLP eval but was cleared for dental soft with nectar thick (daughter says was compliant with soft diet but had been drinking water)  - Rapid flu negative  - Afebrile, WCC 12  - Procal mildly elevated 0.46  - DDx HAP vs aspiration PNA vs. PE (Wells Criteria for PE is only 1.5)      Plan  - Admit to IM4   - Supplemental O2 as required to keep sats >92%  - Blood and urine cultures drawn   - Send respiratory viral panel  - Already received sepsis bolus in ED,   - D/w ID, start empiric vanc and pip/tazo  - Soft diet, nectar thick, strict aspiration precautions  - PT/OT/SLP  - Daughter states patient is DNR/DNI    - Leukocytosis stable from yesterday (12.8 to 13.2 today).  - Continue IV antibiotics  - Concern for pulmonary edema considering sepsis bolus yesterday; CXR AP/Lateral ordered.   - With worsening hypoxia today; PE would be in differential but patient has low Well's (1.5) but will order bilateral LE US and d-dimer to rule out PE; if d-dimer elevated may consider CTA

## 2018-02-21 NOTE — ASSESSMENT & PLAN NOTE
- Secondary to E.coli per last admission.  - Last day of ciprofloxacin on 2/23 - but being treated with Zosyn here which would provide adequate coverage.  - UA demonstrable for clearing infection.

## 2018-02-21 NOTE — PROGRESS NOTES
SLP Bedside Swallow Evaluation completed.  Pt is safe to advance to thin liquids. Formal note to follow.    DONTE Perdomo, CCC-SLP  Speech Language Pathologist  [phone]  2/21/2018

## 2018-02-21 NOTE — PHARMACY MED REC
"Admission Medication Reconciliation - Pharmacy Consult Note    The home medication history was taken by Priscila Shafer Pharmacy Tech.  Based on information gathered and subsequent review by the clinical pharmacist, the items below may need attention.    You may go to "Admission" then "Reconcile Home Medications" tabs to review and/or act upon these items.        No issues noted with the medication reconciliation.          Please address this information as you see fit.  Feel free to contact us if you have any questions or require assistance.    Sigrid Leavitt PharmD, [facility]  Internal Medicine Clinical Pharmacy Specialist  Spectra link: [phone]                  .    .        "

## 2018-02-21 NOTE — PT/OT/SLP EVAL
Physical Therapy Evaluation/Treatment    Patient Name:  Delfina Pelletier   MRN:  [id number]    Recommendations:     Discharge Recommendations:  home health PT   Discharge Equipment Recommendations: wheelchair   Barriers to discharge: None    Assessment:     Delfina Pelletier is a 89 y.o. female admitted with a medical diagnosis of Acute respiratory failure with hypoxia.  She presents with the following impairments/functional limitations:  weakness, impaired endurance, impaired self care skills, impaired functional mobilty, gait instability, impaired balance, decreased lower extremity function, decreased safety awareness, impaired cognition requiring max/total assist for bed mobility and max assist to maintain sitting balance EOB with inability to stand or ambulate at this time due to weakness.     Rehab Prognosis:  fair; patient would benefit from acute skilled PT services to address these deficits and reach maximum level of function.      Recent Surgery: * No surgery found *      Plan:     During this hospitalization, patient to be seen 3 x/week to address the above listed problems via gait training, therapeutic activities, therapeutic exercises, neuromuscular re-education  · Plan of Care Expires:  03/23/18   Plan of Care Reviewed with: patient, sibling    Subjective     Communicated with pt, sister, and nurse prior to session.  Patient found supine in bed with pillow placed under L side with sister present upon PT entry to room, agreeable to evaluation.      Chief Complaint: none  Patient comments/goals: pt did not state  Pain/Comfort:  · Pain Rating 1: 0/10  · Pain Rating Post-Intervention 1: 0/10    Patients cultural, spiritual, Buddhist conflicts given the current situation: none    Living Environment:  Pt lives with daughter in 1st floor apartment with no FERNANDO  Prior to admission, patients level of function was requiring assistance for bed mobility and ADLs with pt primarily being bed bound for about 1 month and  given bed baths. Prior to this, pt was able to ambulate short distances within home with assistance without AD.  Patient has the following equipment: bedside commode, hospital bed, shower chair, walker, rolling.  DME owned (not currently used): rolling walker and shower chair.  Upon discharge, patient will have assistance from daughter and family.    Objective:     Patient found with: oxygen, peripheral IV     General Precautions: Standard, fall, aspiration   Orthopedic Precautions:N/A   Braces: N/A     Exams:  · Cognitive Exam:  Patient is oriented to person when asked to confirm name and follows 100% of single step commands with cuing  · Fine Motor Coordination:    · -       Impaired   · Gross Motor Coordination:  impaired  · Postural Exam:  Patient presented with the following abnormalities:    · -       Rounded shoulders  · -       Forward head  · -       Posterior pelvic tilt  · -       Kyphosis  · Skin Integrity/Edema:      · -       Skin integrity: Wound on sacrum  · -       Edema: None noted BLE  · RLE ROM: WFL  · RLE Strength: Deficits: 2/5 gross LE strength  · LLE ROM: WFL  · LLE Strength: Deficits: 2/5 gross LE strength    Functional Mobility:  · Bed Mobility:     · Rolling Left:  maximal assistance  · Rolling Right: maximal assistance  · Scooting: total assistance  · Supine to Sit: maximal assistance and total assistance  · Sit to Supine: maximal assistance and total assistance  · Balance: pt was able to tolerate sitting EOB for approx 8-10 min with PT providing initial min assist with verbal and tactile instruction for BUE weight bearing and feet on floor for stability but progressing to max/total assist due to pt weakness and fatigue. pt demonstrates posterior trunk lean and requires consistent verbal and tactile stimulation to weight shift anteriorly to maintain upright posture    AM-PAC 6 CLICK MOBILITY  Total Score:8       Therapeutic Activities and Exercises:   PT provided pt and sister education  on:   -role of PT and POC   -LE AAROM exercises to perform independently   -repositioning to prevent further skin breakdown    Patient left supine with pillow placed under R side with call button in reach and sister present.    GOALS:    Physical Therapy Goals        Problem: Physical Therapy Goal    Goal Priority Disciplines Outcome Goal Variances Interventions   Physical Therapy Goal     PT/OT, PT Ongoing (interventions implemented as appropriate)     Description:  Goals to be met by: 18     Patient will increase functional independence with mobility by performin. Supine to sit with MInimal Assistance  2. Sit to supine with MInimal Assistance  3. Rolling to Left and Right with Stand-by Assistance.  4. Sit to stand transfer with Maximum Assistance  5. Bed to chair transfer with Maximum Assistance using RW  6. Gait  x 10 feet with Maximum Assistance using Rolling Walker.   7. Sitting at edge of bed x10 minutes with Stand-by Assistance                      History:     Past Medical History:   Diagnosis Date    Diabetes mellitus     Diabetes mellitus type II     Diabetic neuropathy     Hyperlipidemia     Hypertension     Tuberculosis        Past Surgical History:   Procedure Laterality Date    left lung lobectomy for TB      removed at 20yrs old       Clinical Decision Making:     History  Co-morbidities and personal factors that may impact the plan of care Examination  Body Structures and Functions, activity limitations and participation restrictions that may impact the plan of care Clinical Presentation   Decision Making/ Complexity Score   Co-morbidities:   [x] Time since onset of injury / illness / exacerbation  [x] Status of current condition  [x]Patient's cognitive status and safety concerns    [x] Multiple Medical Problems (see med hx)  Personal Factors:   [x] Patient's age  [] Prior Level of function   [] Patient's home situation (environment and family support)  [x] Patient's level of  motivation  [x] Expected progression of patient      HISTORY:(criteria)    [] 12364 - no personal factors/history    [] 51672 - has 1-2 personal factor/comorbidity     [x] 07709 - has >3 personal factor/comorbidity     Body Regions:  [] Objective examination findings  [] Head     []  Neck  [x] Trunk   [] Upper Extremity  [x] Lower Extremity    Body Systems:  [x] For communication ability, affect, cognition, language, and learning style: the assessment of the ability to make needs known, consciousness, orientation (person, place, and time), expected emotional /behavioral responses, and learning preferences (eg, learning barriers, education  needs)  [x] For the neuromuscular system: a general assessment of gross coordinated movement (eg, balance, gait, locomotion, transfers, and transitions) and motor function  (motor control and motor learning)  [x] For the musculoskeletal system: the assessment of gross symmetry, gross range of motion, gross strength, height, and weight  [x] For the integumentary system: the assessment of pliability(texture), presence of scar formation, skin color, and skin integrity  [x] For cardiovascular/pulmonary system: the assessment of heart rate, respiratory rate, blood pressure, and edema     Activity limitations:    [x] Patient's cognitive status and saf ety concerns          [] Status of current condition      [] Weight bearing restriction  [] Cardiopulmunary Restriction    Participation Restrictions:   [] Goals and goal agreement with the patient     [x] Rehab potential (prognosis) and probable outcome      Examination of Body System: (criteria)    [] 21756 - addressing 1-2 elements    [] 28862 - addressing a total of 3 or more elements     [x] 39778 -  Addressing a total of 4 or more elements         Clinical Presentation: (criteria)  Evolving - 34451     On examination of body system using standardized tests and measures patient presents with 3 or more elements from any of the  following: body structures and functions, activity limitations, and/or participation restrictions.  Leading to a clinical presentation that is considered evolving with changing characteristics                              Clinical Decision Making  (Eval Complexity):  Moderate - 44940     Time Tracking:     PT Received On: 02/21/18  PT Start Time: 0955     PT Stop Time: 1019  PT Total Time (min): 24 min     Billable Minutes: Evaluation 14 and Therapeutic Activity 10      Samanta Shin, PT  02/21/2018

## 2018-02-21 NOTE — PT/OT/SLP EVAL
Occupational Therapy   Evaluation    Name: Delfina Pelletier  MRN: [id number]  Admitting Diagnosis:  Acute respiratory failure with hypoxia      Recommendations:     Discharge Recommendations: home with home health  Discharge Equipment Recommendations:  none  Barriers to discharge:  None    History:     Occupational Profile:  Living Environment: Pt lives with her daughter in a 1st floor apt which has a tub/shower set-up. Daughter is home during the day.  Previous level of function: Increasingly dependent for about 2 weeks leading to spending most of the time in bed now. Previously, was walking around with assistance but now needs assist with dressing/bathing (mostly in bed).   Equipment Owned:  hospital bed, walker, rolling, shower chair, bedside commode  Assistance upon Discharge: Daughter/sister    Past Medical History:   Diagnosis Date    Diabetes mellitus     Diabetes mellitus type II     Diabetic neuropathy     Hyperlipidemia     Hypertension     Tuberculosis        Past Surgical History:   Procedure Laterality Date    left lung lobectomy for TB      removed at 20yrs old       Subjective     Chief Complaint: weakness  Pain/Comfort:  ·      Patients cultural, spiritual, Gnosticism conflicts given the current situation:      Objective:     Patient found with: oxygen    General Precautions: Standard, fall, aspiration   Orthopedic Precautions:    Braces:       Occupational Performance:    Bed Mobility:    · Patient completed Rolling/Turning to Left with  total assistance  · Patient completed Rolling/Turning to Right with total assistance  · Patient completed Supine to Sit with total assistance  · Patient completed Sit to Supine with moderate assistance    Functional Mobility/Transfers:  · Did not attempt - not appropriate at this time.    Activities of Daily Living:  · Grooming: minimum assistance to guide hand to face to wipe with rag.    Cognitive/Visual Perceptual:  Cognitive/Psychosocial Skills:     -        "Oriented to: Person   -       Follows Commands/attention:Inattentive, Follows one-step commands and inconsistent with command-following.  -       Memory: Impaired STM, Impaired LTM and Poor immediate recall  -       Safety awareness/insight to disability: impaired     Physical Exam:  Skin integrity: Wound of sacrum and R heel.  Upper Extremity Range of Motion:     -       Right Upper Extremity: WFL  -       Left Upper Extremity: WFL  Upper Extremity Strength:    -       Right Upper Extremity: 3+/5 grossly  -       Left Upper Extremity: 3+/5 grossly    Patient left left sidelying with all lines intact and call button in reach    AMPA 6 Click:  AMPAC Total Score: 8    Treatment & Education:  UE ROM/MMT  Bed mobility training / assessment  Sitting balance assessment  - sat EOB ~ 8 minutes requiring CGA-Max A for balance and cueing to lean forward.  Discussed OT POC / Post-acute plan  Education:    Assessment:     Delfina Pelletier is a 89 y.o. female with a medical diagnosis of Acute respiratory failure with hypoxia.  She presents with dementia demonstrating decreased alertness, inconsistent command-following and poor orientation to situation. Pt has decreased functionally recently and is now needing more help at home with ADLs. OT will continue to follow to improve functional (I) in order to relieve some of the caregiver burden. Performance deficits affecting function are weakness, impaired endurance, impaired self care skills, gait instability, decreased coordination, decreased lower extremity function, decreased ROM, decreased safety awareness, decreased upper extremity function, impaired cognition, impaired balance, impaired functional mobilty.      Rehab Prognosis:  Fair; patient would benefit from acute skilled OT services to address these deficits and reach maximum level of function.         Clinical Decision Makin.  OT Low:  "Pt evaluation falls under low complexity for evaluation coding due to " "performance deficits noted in 1-3 areas as stated above and 0 co-morbities affecting current functional status. Data obtained from problem focused assessments. No modifications or assistance was required for completion of evaluation. Only brief occupational profile and history review completed."     Plan:     Patient to be seen 2 x/week to address the above listed problems via self-care/home management, therapeutic activities, therapeutic exercises, therapeutic groups, neuromuscular re-education  · Plan of Care Expires: 03/23/18  · Plan of Care Reviewed with: patient, sibling    This Plan of care has been discussed with the patient who was involved in its development and understands and is in agreement with the identified goals and treatment plan    GOALS:    Occupational Therapy Goals        Problem: Occupational Therapy Goal    Goal Priority Disciplines Outcome Interventions   Occupational Therapy Goal     OT, PT/OT Ongoing (interventions implemented as appropriate)    Description:  Goals to be met by: 2/28/18    Patient will increase functional independence with ADLs by performing:    UE Dressing with Moderate Assistance.  Grooming while seated with Minimal Assistance.  Sitting at edge of bed x15 minutes with Minimal Assistance.  Rolling to Bilateral with Moderate Assistance.   Supine to sit with Moderate Assistance.  Stand pivot transfers with Moderate Assistance.                      Time Tracking:     OT Date of Treatment: 02/21/18  OT Start Time: 0954  OT Stop Time: 1016  OT Total Time (min): 22 min    Billable Minutes:Evaluation 22    ZAHRAA Solitario  2/21/2018    "

## 2018-02-21 NOTE — PROGRESS NOTES
Wound care consult received for sacrum wound.  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 Pt admitted with acute respiratory failure with hypoxia. Dr. Downing at bedside and aware of pts pressure injuries.  Recommendations:  1. Pressure injury prevention interventions to include SUSHANT overlay  2. Triad ointment BID and prn cleaning  3. Foam dressings to R heel, L leg, L ankle  Wound care nurse to follow pt prn [id number]       02/21/18 0924       Wound 02/09/18 End of life skin changes Sacral Spine   Date First Assessed: 02/09/18   Pre-existing: Yes  Wound Type: End of life skin changes  Location: Sacral Spine   Wound Image    Wound WDL ex   Dressing Appearance No dressing   Drainage Amount Small   Drainage Characteristics/Odor Serosanguineous   Appearance Red;Maroon;Purple;Black;Gray;Moist;Pink   Periwound Area Denuded   Wound Edges Jagged   Wound Length (cm) 10   Wound Width (cm) 12   Depth (cm) .1   Care Cleansed with:;Sterile normal saline;Other (see comments)  (ordered Triad)   Dressing Change Due 02/21/18       Pressure Injury 02/09/18 Left lateral Malleolus Deep tissue injury   Date First Assessed: 02/09/18   Pressure Injury Present on Admission: yes  Side: Left  Orientation: lateral  Location: Malleolus  Staging: Deep tissue injury   Wound Image (see photo for leg)   Staging D  (chronic)   Dressing Appearance No dressing   Drainage Amount None   Drainage Characteristics/Odor No odor   Appearance Gray;Black   Wound Length (cm) 1   Wound Width (cm) 1   Depth (cm) 0   Dressing Hydrofiber;Foam  (ordered)       Pressure Injury 02/09/18 Right medial Heel Deep tissue injury   Date First Assessed: 02/09/18   Pressure Injury Present on Admission: yes  Side: Right  Orientation: medial  Location: Heel  Staging: Deep tissue injury   Wound Image    Staging D  (chronic)   Dressing Appearance No dressing   Drainage Amount None    Drainage Characteristics/Odor No odor   Appearance Black;Gray   Wound Length (cm) 2   Wound Width (cm) 2   Depth (cm) 0   Dressing Hydrofiber;Foam  (ordered)       Pressure Injury 02/21/18 Left lower Leg Deep tissue injury   Date First Assessed: 02/21/18   Pressure Injury Present on Admission: yes  Side: Left  Orientation: lower  Location: Leg  Staging: Deep tissue injury  Wound Length (cm): 6  Wound Width (cm): 3  Depth (cm): 0   Wound Image    Staging D  (chronic)   Dressing Appearance No dressing   Drainage Amount None   Drainage Characteristics/Odor No odor   Appearance Gray;Black   Wound Length (cm) 6   Wound Width (cm) 3   Depth (cm) 0   Dressing Hydrofiber;Foam  (ordered)   Dressing Change Due 02/21/18

## 2018-02-21 NOTE — PLAN OF CARE
Problem: Occupational Therapy Goal  Goal: Occupational Therapy Goal  Goals to be met by: 2/28/18    Patient will increase functional independence with ADLs by performing:    UE Dressing with Moderate Assistance.  Grooming while seated with Minimal Assistance.  Sitting at edge of bed x15 minutes with Minimal Assistance.  Rolling to Bilateral with Moderate Assistance.   Supine to sit with Moderate Assistance.  Stand pivot transfers with Moderate Assistance.    Outcome: Ongoing (interventions implemented as appropriate)  Evaluation completed and POC established.    ZAHRAA Smith

## 2018-02-21 NOTE — PLAN OF CARE
Problem: Physical Therapy Goal  Goal: Physical Therapy Goal  Goals to be met by: 18     Patient will increase functional independence with mobility by performin. Supine to sit with MInimal Assistance  2. Sit to supine with MInimal Assistance  3. Rolling to Left and Right with Stand-by Assistance.  4. Sit to stand transfer with Maximum Assistance  5. Bed to chair transfer with Maximum Assistance using RW  6. Gait  x 10 feet with Maximum Assistance using Rolling Walker.   7. Sitting at edge of bed x10 minutes with Stand-by Assistance    Outcome: Ongoing (interventions implemented as appropriate)  Goals set today

## 2018-02-21 NOTE — PROGRESS NOTES
[facility] Hospital Medicine  Progress Note    Patient Name: Delfina Pelletier  MRN: [id number]  Patient Class: IP- Inpatient   Admission Date: 2/20/2018  Length of Stay: 1 days  Attending Physician: Se Valenzuela MD  Primary Care Provider: Dimitri Castellanos MD    Hospital Medicine Team: [facility] HOSP MED 4 Vahe Downing MD    Subjective:     Principal Problem:Acute respiratory failure with hypoxia    HPI:  88yo F with dementia, T2DM, history of partial pneumonectomy for TB and recent admit for urosepsis with E. Coli bacteremia, presents on advice of Home Health nurse after she was found to have low O2 sats. Patient herself does not have any complaints. Per daughter (main caregiver) and sister at bedside she has had a non-productive cough over the last few days, no fever or chills. Daughter and sister deny any changes to her mental status (at baseline AAOx1). No chest pain. No abdo pain/ nausea or vomiting. She voids into diaper and daughter says she hasn't noticed any change in urine color or smell when changing her. No dysuria. She was admitted to [facility] from 2/8/2018 to 2/14/2018 with urosepsis with E. Coli bacteremia- received 3 doses of ceftriaxone and discharged on course of ciprofloxacin with Home Health. Daughter says patient has been bed bound since her last admission. During last admission, she was evaluated by SLP who recommended dental soft with nectar thick liquids. Daughter says she has continued on soft diet although she was drinking water at home. Daughter says there have been no signs of cough or regurgitating food/drink. She has sacral and right heel decubitus sores which have remained unchanged since she was discharged.     In ED she was found to have pulse oximetry of 87% on RA, improved to 95% with 2L O2. Her BNP was 88 (lower than on day of discharge on last admit), her procal was 0.46, rapid flu was negative and CXR did not show any convincing focal consolidation. Blood and  urine cultures were drawn, she was given a 30 cc/kg bolus of normal saline and started on vanc and pip/tazo.    Hospital Course:  02/20/2018: Admitted to IM4. Started vanc & pipt/tazo for presumed PNA (HAP vs aspiration).  02/21/2018: Patient with noted hypoxia upon check in the morning (SpO2 in 70-80s) on 2L nasal cannula. Patient was put on non-rebreather with resolution of low O2 saturations. Of note, patient with noted hypoxemia on ABG with pO2 of 43. She otherwise was in no respiratory distress and looked comfortable without use of accessory muscles. Patient remained afebrile without worsening cough.        Past Medical History:   Diagnosis Date    Diabetes mellitus     Diabetes mellitus type II     Diabetic neuropathy     Hyperlipidemia     Hypertension     Tuberculosis        Past Surgical History:   Procedure Laterality Date    left lung lobectomy for TB      removed at 20yrs old       Review of patient's allergies indicates:  No Known Allergies    No current facility-administered medications on file prior to encounter.      Current Outpatient Prescriptions on File Prior to Encounter   Medication Sig    blood sugar diagnostic Strp 1 each by Misc.(Non-Drug; Combo Route) route once daily. True Metrix Glucometer Test Strips  Test blood sugar once daily    ciprofloxacin HCl (CIPRO) 500 MG tablet Take 1 tablet (500 mg total) by mouth once daily.    donepezil (ARICEPT) 10 MG tablet Take 1 tablet (10 mg total) by mouth once daily.    lancets Misc 1 each by Misc.(Non-Drug; Combo Route) route once daily. TRUE METRIX METER    metFORMIN (GLUCOPHAGE) 500 MG tablet Take 1 tablet (500 mg total) by mouth 2 (two) times daily with meals.    mirtazapine (REMERON) 7.5 MG Tab Take 1 tablet (7.5 mg total) by mouth every evening.    NIFEdipine (NIFEDICAL XL) 60 MG (OSM) 24 hr tablet Take 1 tablet (60 mg total) by mouth once daily.    rosuvastatin (CRESTOR) 20 MG tablet Take 1 tablet (20 mg total) by mouth once  daily.     Family History     Problem Relation (Age of Onset)    Arthritis Brother    Diabetes Mother, Sister, Daughter, Son, Daughter    Hypertension Mother, Daughter, Sister        Social History Main Topics    Smoking status: Former Smoker     Quit date: 1/19/1988    Smokeless tobacco: Never Used    Alcohol use No    Drug use: No    Sexual activity: No     Review of Systems   Unable to perform ROS: Dementia   Constitutional: Negative for chills and fever.   Respiratory: Positive for cough. Negative for choking.    Cardiovascular: Negative for chest pain.   Gastrointestinal: Negative for abdominal distention, abdominal pain, nausea and vomiting.   Genitourinary: Negative for difficulty urinating and dysuria.   Skin: Positive for wound. Negative for rash.   Neurological: Positive for weakness.   Psychiatric/Behavioral: Positive for decreased concentration.     Objective:     Vital Signs (Most Recent):  Temp: 96.5 °F (35.8 °C) (02/21/18 1130)  Pulse: 85 (02/21/18 1239)  Resp: (!) 24 (02/21/18 1239)  BP: (!) 151/63 (02/21/18 1130)  SpO2: 99 % (02/21/18 1239) Vital Signs (24h Range):  Temp:  [96.5 °F (35.8 °C)-97.8 °F (36.6 °C)] 96.5 °F (35.8 °C)  Pulse:  [71-85] 85  Resp:  [16-39] 24  SpO2:  [58 %-100 %] 99 %  BP: (111-152)/(57-68) 151/63     Weight: 47.5 kg (104 lb 11.5 oz)  Body mass index is 17.97 kg/m².    Physical Exam   Constitutional: No distress.   Cachectic, bed bound   HENT:   Head: Normocephalic and atraumatic.   Mouth/Throat: Oropharynx is clear and moist.   Eyes: Pupils are equal, round, and reactive to light.   Neck: Neck supple. No JVD present.   Cardiovascular: Normal rate, regular rhythm, normal heart sounds and intact distal pulses.    Pulmonary/Chest: No respiratory distress. She has no wheezes. She has rhonchi in the right middle field and the right lower field. She has no rales.   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 Bowel sounds are normal.  She exhibits no distension. There is no tenderness.   Neurological: She is alert.   AAOx1   Skin: Skin is warm and dry. Capillary refill takes 2 to 3 seconds. She is not diaphoretic.   Sacral decubitus DTI  Right heel pressure ulcer         CRANIAL NERVES     CN III, IV, VI   Pupils are equal, round, and reactive to light.       Significant Labs:   CBC:   Recent Labs  Lab 02/20/18  1205 02/21/18  0414   WBC 12.83* 13.16*   HGB 9.9* 9.3*   HCT 31.9* 31.5*   * 316     CMP:   Recent Labs  Lab 02/20/18  1205 02/21/18  0414    142   K 4.1 4.1    105   CO2 29 25   * 123*   BUN 20 16   CREATININE 1.0 0.8   CALCIUM 8.9 8.4*   PROT 7.0 6.7   ALBUMIN 1.9* 1.8*   BILITOT 0.2 0.3   ALKPHOS 95 88   AST 44* 41*   ALT 42 33   ANIONGAP 10 12   EGFRNONAA 50.1* >60.0       Significant Imaging:   Imaging Results          X-Ray Chest AP Portable (Final result)  Result time 02/20/18 13:02:40    Final result by Tien Brown MD (02/20/18 13:02:40)                 Impression:      Grossly stable chronic pulmonary findings, no convincing large focal consolidation.  Interstitial edema on the right remains.        Electronically signed by: TIEN BROWN MD  Date:     02/20/18  Time:    13:02              Narrative:    Chest AP portable    Indication:Sepsis    Comparison:12/11/2018    Findings:  The cardiomediastinal silhouette is grossly stable the configuration noting mediastinal shift to the left and calcification of the aortic arch, similar to the previous exam.  There is no pleural effusion.  The trachea is midline.  The lungs are asymmetrically expanded in this left pneumonectomy patient, the right hemithorax is fully expanded. There is coarse interstitial attenuation throughout the right lung, similar to the previous exam. No large focal consolidation seen.  There is no pneumothorax.  The osseous structures are remarkable for degenerative changes, as well as dextroscoliotic curvature of the thoracic  spine.                                  Assessment/Plan:      * Acute respiratory failure with hypoxia    Acute hypoxic respiratory failure in the setting of left pneumonectomy.  - [place] [facility] [place]  - At risk for aspiration based on last SLP eval but was cleared for dental soft with nectar thick (daughter says was compliant with soft diet but had been drinking water)  - Rapid flu negative  - Afebrile, WCC 12  - Procal mildly elevated 0.46  - DDx HAP vs aspiration PNA vs. PE (Wells Criteria for PE is only 1.5)      Plan  - Admit to IM4   - Supplemental O2 as required to keep sats >92%  - Blood and urine cultures drawn   - Send respiratory viral panel  - Already received sepsis bolus in ED,   - D/w ID, start empiric vanc and pip/tazo  - Soft diet, nectar thick, strict aspiration precautions  - PT/OT/SLP  - Daughter states patient is DNR/DNI  - ABG on non-rebreather on 2/21 with pO2 of 43.     - Leukocytosis stable from yesterday (12.8 to 13.2 today).  - Continue IV antibiotics  - Patient remains afebrile and hemodynamically stable.  - Concern for pulmonary edema considering sepsis bolus yesterday; CXR AP/Lateral ordered.   - With worsening hypoxia today; PE would be in differential but patient has low Well's (1.5) but will order bilateral LE US and d-dimer to rule out PE; if d-dimer elevated may consider CTA        Severe protein-calorie malnutrition    - Mirtazapine 7.5mg qhs        History of diabetes mellitus    A1c 2/8/2018 = 5.4   - On metformin at home  - POCT glucose checks AC & HS  - LDSSI for now        Bacteremia due to Escherichia coli    - Secondary to E.coli per last admission.  - Last day of ciprofloxacin on 2/23 - but being treated with Zosyn here which would provide adequate coverage.  - UA demonstrable for clearing infection.           Hyperlipidemia    - Continue rosuvastatin 20mg        Deep tissue injury              Alteration in skin integrity    - Wound care  consulted, appreciate recs  - Q2 turns  - Heel boot protectors        Dementia without behavioral disturbance    - Continue home donepezil 10mg qd and mirtazapine 7.5mg qhs  - Delirium precautions        Essential hypertension    - Continue home nifedipine 60mg qd          VTE Risk Mitigation         Ordered     enoxaparin injection 30 mg  Daily     Route:  Subcutaneous        02/20/18 1559     Medium Risk of VTE  Once      02/20/18 1600              Vahe Downing MD  PGY-2 Internal Medicine  [phone]    Department of Hospital Medicine   [facility]

## 2018-02-21 NOTE — ASSESSMENT & PLAN NOTE
Acute hypoxic respiratory failure in the setting of left pneumonectomy.  - [place] [facility] [place]  - Rapid flu negative  - Afebrile, WCC 12  - Procal mildly elevated 0.46  - DDx HAP vs aspiration PNA vs. PE (Wells Criteria for PE is only 1.5)   - LE US negative for DVT, D-dimer elevated but suspect related to infection   - BCx and UCx NGTD  - Respiratory viral panel not sent   - SLP cleared yesterday for thin liquids  - ABG on non-rebreather on 2/21 with pO2 of 43  - CXR yesterday without significant detrimental changes  - Improved overnight after furosemide 20mg IV x2    Plan  - Required NRB yesterday but now with O2 sats of 100% on 4L, will attempt to wean O2 today- goal sats >92%  - Send respiratory viral panel  - Continue vanc and pip/tazo  - Continue furosemide 20mg IV x1 today

## 2018-02-21 NOTE — NURSING
Pt arrived from [facility], 2 daughters at bedside.  VS charted.  Call light within within reach.  Daughter will stay with pt tonight as she is not oriented.

## 2018-02-21 NOTE — PLAN OF CARE
"CM familiar w/ pt - recent admission. Pt w/ DME in place, lives w/ Naomi melo. Pt is active w/ [facility] h/h and likely will resume at d/c. Pt required w/c van transport home at prior discharge.    CM provided patient anticipated EDILBERTO which will be update by nursing staff. Patient was not provided a Blue "My Health Packet" for d/c planning and health tool - received at prior admit. Patient verbalized understanding.     02/21/18 1227   Discharge Assessment   Assessment Type Discharge Planning Assessment   Confirmed/corrected address and phone number on facesheet? Yes   Assessment information obtained from? Patient;Medical Record   Expected Length of Stay (days) 3   Communicated expected length of stay with patient/caregiver yes   Prior to hospitilization cognitive status: Not Oriented to Time   Prior to hospitalization functional status: Assistive Equipment;Needs Assistance   Current cognitive status: Not Oriented to Time   Current Functional Status: Assistive Equipment;Needs Assistance   Facility Arrived From: N/A   Lives With child(marianela), adult   Able to Return to Prior Arrangements unable to determine at this time (comments)   Is patient able to care for self after discharge? Unable to determine at this time (comments)   Who are your caregiver(s) and their phone number(s)? kameron  Naomi [phone]   Patient's perception of discharge disposition home health   Readmission Within The Last 30 Days current reason for admission unrelated to previous admission   If yes, most recent facility name: [facility]   Patient currently being followed by outpatient case management? No   Patient currently receives any other outside agency services? Yes   Name and contact number of agency or person providing outside services [facility] h[facility]   Is it the patient/care giver preference to resume care with the current outside agency? Yes   Equipment Currently Used at Home glucometer;hospital bed;other (see comments)  (family " reports having BSC & RW that was given to pt)   Do you have any problems affording any of your prescribed medications? No   Is the patient taking medications as prescribed? yes   Does the patient have transportation home? Yes   Transportation Available family or friend will provide   Dialysis Name and Scheduled days N/A   Does the patient receive services at the [facility]? No   Discharge Plan A Home with family;Home Health   Discharge Plan B Skilled Nursing Facility   Patient/Family In Agreement With Plan yes   Readmission Questionnaire   At the time of your discharge, did someone talk to you about what your health problems were? Yes  (readmit completed on opening screen)

## 2018-02-21 NOTE — PT/OT/SLP EVAL
"Speech Language Pathology Evaluation  Bedside Swallow    Patient Name:  Delfina Pelletier   MRN:  [id number]  Admitting Diagnosis: Acute respiratory failure with hypoxia    Recommendations:                 General Recommendations:  Dysphagia therapy  Diet recommendations:  Dental Soft, Thin   Aspiration Precautions: 1 bite/sip at a time, Alternating bites/sips, Assistance with meals, Check for pocketing/oral residue, Eliminate distractions, Feed only when awake/alert, Frequent oral care, HOB to 90 degrees, Meds crushed in puree, Monitor for s/s of aspiration, Remain upright 30 minutes post meal, Small bites/sips and Strict aspiration precautions   General Precautions: Standard, aspiration, fall  Communication strategies:  provide increased time to answer and go to room if call light pushed    History:     Past Medical History:   Diagnosis Date    Diabetes mellitus     Diabetes mellitus type II     Diabetic neuropathy     Hyperlipidemia     Hypertension     Tuberculosis        Past Surgical History:   Procedure Laterality Date    left lung lobectomy for TB      removed at 20yrs old       Prior Intubation HX:  None during this admission    Modified Barium Swallow: none on file    Chest X-Rays: 2/20/18:Impression   Grossly stable chronic pulmonary findings, no convincing large focal consolidation.  Interstitial edema on the right remains.     Prior diet: dental soft/nectar thick liquids currently ordered; recent SLP recommendations from previous admission were dental soft/thin liquids  Pt's daughter states pt was eating mostly mashed up food and thin liquids at home.    Subjective     "It takes her so much time to chew." pt's daughter concerned that pt may become fatigued from increased time needed for chewing solid foods.  However, she would like to allow pt to continue on dental soft diet and  determine if appropriate to remain on this diet at this time.    Objective:     Oral Musculature Evaluation  · Oral " Musculature: WFL, facial asymmetry present (on left)  · Dentition:  (missing molars)  · Secretion Management: adequate  · Mucosal Quality: adequate  · Mandibular Strength and Mobility: WFL  · Oral Labial Strength and Mobility: WFL  · Lingual Strength and Mobility: WFL  · Volitional Cough: decreased force due to confusion with direction  · Volitional Swallow: elicited  · Voice Prior to PO Intake: dry, clear    Bedside Swallow Eval:   Consistencies Assessed:  · Thin liquids tsp, cup, straw sips - pt consumed approx 10oz of thin liquids  · Puree multiple tsp bites of applesauce (plain and with meds crushed /buried)  · Solids 2 small bites of a cracker     Oral Phase:   · Prolonged mastication  · Slow oral transit time    Pharyngeal Phase:   · no overt clinical signs/symptoms of aspiration   · Signs of reflux present - audible swallow quality, burping     Compensatory Strategies  · None    Treatment: Education provided to pt and daughter regarding role of SLP, swallowing evaluation, previous diet recommendations, diet recommendations based on today's assessment, ongoing assessment to determine if pt would benefit from changing to a puree diet for energy conservation, aspiration and reflux precautions, and SLP POC.  Pt's daughter expressed understanding and agreement with POC. Pt was unable to demonstrate full understanding.  White board updated. Nurse notified of recs.     Assessment:     Delfina Pelletier is a 89 y.o. female with an SLP diagnosis of mild dysphagia..      Goals:    SLP Goals        Problem: SLP Goal    Goal Priority Disciplines Outcome   SLP Goal     SLP    Description:  Speech Language Pathology Goals  Goals expected to be met by 2/28:  1. Pt will tolerate a dental soft diet and thin liquids without overt s/s of aspiration.                        Plan:     · Patient to be seen:  3 x/week   · Plan of Care expires:  03/22/18  · Plan of Care reviewed with:  patient, daughter   · SLP Follow-Up:  Yes        Discharge recommendations:   (tbd pending progress)     Time Tracking:     SLP Treatment Date:   02/21/18  Speech Start Time:  1102  Speech Stop Time:  1122     Speech Total Time (min):  20 min    Billable Minutes: Eval Swallow and Oral Function 20    DONTE Perdomo, SKY-SLP  02/21/2018     DONTE Perdomo, CCC-SLP  Speech Language Pathologist  [phone]  2/21/2018

## 2018-02-22 LAB
ALBUMIN SERPL BCP-MCNC: 1.7 G/DL
ALP SERPL-CCNC: 81 U/L
ALT SERPL W/O P-5'-P-CCNC: 32 U/L
ANION GAP SERPL CALC-SCNC: 9 MMOL/L
AST SERPL-CCNC: 30 U/L
BACTERIA UR CULT: NO GROWTH
BASOPHILS # BLD AUTO: 0.03 K/UL
BASOPHILS NFR BLD: 0.2 %
BILIRUB SERPL-MCNC: 0.2 MG/DL
BUN SERPL-MCNC: 18 MG/DL
CALCIUM SERPL-MCNC: 8.1 MG/DL
CHLORIDE SERPL-SCNC: 104 MMOL/L
CO2 SERPL-SCNC: 30 MMOL/L
CREAT SERPL-MCNC: 0.8 MG/DL
DIFFERENTIAL METHOD: ABNORMAL
EOSINOPHIL # BLD AUTO: 0.1 K/UL
EOSINOPHIL NFR BLD: 0.8 %
ERYTHROCYTE [DISTWIDTH] IN BLOOD BY AUTOMATED COUNT: 14.5 %
EST. GFR  (AFRICAN AMERICAN): >60 ML/MIN/1.73 M^2
EST. GFR  (NON AFRICAN AMERICAN): >60 ML/MIN/1.73 M^2
GLUCOSE SERPL-MCNC: 138 MG/DL
HCT VFR BLD AUTO: 29.9 %
HGB BLD-MCNC: 8.7 G/DL
IMM GRANULOCYTES # BLD AUTO: 0.07 K/UL
IMM GRANULOCYTES NFR BLD AUTO: 0.5 %
LYMPHOCYTES # BLD AUTO: 1.4 K/UL
LYMPHOCYTES NFR BLD: 10.6 %
MAGNESIUM SERPL-MCNC: 1.8 MG/DL
MCH RBC QN AUTO: 27.8 PG
MCHC RBC AUTO-ENTMCNC: 29.1 G/DL
MCV RBC AUTO: 96 FL
MONOCYTES # BLD AUTO: 0.8 K/UL
MONOCYTES NFR BLD: 6.3 %
NEUTROPHILS # BLD AUTO: 10.4 K/UL
NEUTROPHILS NFR BLD: 81.6 %
NRBC BLD-RTO: 0 /100 WBC
PHOSPHATE SERPL-MCNC: 2.9 MG/DL
PLATELET # BLD AUTO: 331 K/UL
PMV BLD AUTO: 11.3 FL
POCT GLUCOSE: 157 MG/DL (ref 70–110)
POCT GLUCOSE: 159 MG/DL (ref 70–110)
POCT GLUCOSE: 169 MG/DL (ref 70–110)
POCT GLUCOSE: 195 MG/DL (ref 70–110)
POTASSIUM SERPL-SCNC: 3.4 MMOL/L
PROT SERPL-MCNC: 6.3 G/DL
RBC # BLD AUTO: 3.13 M/UL
SODIUM SERPL-SCNC: 143 MMOL/L
VANCOMYCIN TROUGH SERPL-MCNC: 13.6 UG/ML
WBC # BLD AUTO: 12.73 K/UL

## 2018-02-22 PROCEDURE — 11000001 HC ACUTE MED/SURG PRIVATE ROOM

## 2018-02-22 PROCEDURE — 97110 THERAPEUTIC EXERCISES: CPT

## 2018-02-22 PROCEDURE — 80053 COMPREHEN METABOLIC PANEL: CPT

## 2018-02-22 PROCEDURE — 99233 SBSQ HOSP IP/OBS HIGH 50: CPT | Mod: ,,, | Performed by: INTERNAL MEDICINE

## 2018-02-22 PROCEDURE — 80202 ASSAY OF VANCOMYCIN: CPT

## 2018-02-22 PROCEDURE — 25000003 PHARM REV CODE 250: Performed by: STUDENT IN AN ORGANIZED HEALTH CARE EDUCATION/TRAINING PROGRAM

## 2018-02-22 PROCEDURE — 84100 ASSAY OF PHOSPHORUS: CPT

## 2018-02-22 PROCEDURE — 63600175 PHARM REV CODE 636 W HCPCS: Performed by: STUDENT IN AN ORGANIZED HEALTH CARE EDUCATION/TRAINING PROGRAM

## 2018-02-22 PROCEDURE — A4216 STERILE WATER/SALINE, 10 ML: HCPCS | Performed by: STUDENT IN AN ORGANIZED HEALTH CARE EDUCATION/TRAINING PROGRAM

## 2018-02-22 PROCEDURE — 83735 ASSAY OF MAGNESIUM: CPT

## 2018-02-22 PROCEDURE — 36415 COLL VENOUS BLD VENIPUNCTURE: CPT

## 2018-02-22 PROCEDURE — 85025 COMPLETE CBC W/AUTO DIFF WBC: CPT

## 2018-02-22 RX ORDER — LEVOFLOXACIN 500 MG/1
500 TABLET, FILM COATED ORAL DAILY
Qty: 14 TABLET | Refills: 0
Start: 2018-02-22 | End: 2018-02-24

## 2018-02-22 RX ORDER — FUROSEMIDE 10 MG/ML
20 INJECTION INTRAMUSCULAR; INTRAVENOUS ONCE
Status: COMPLETED | OUTPATIENT
Start: 2018-02-22 | End: 2018-02-22

## 2018-02-22 RX ORDER — LANOLIN ALCOHOL/MO/W.PET/CERES
400 CREAM (GRAM) TOPICAL 2 TIMES DAILY
Status: DISCONTINUED | OUTPATIENT
Start: 2018-02-22 | End: 2018-02-24 | Stop reason: HOSPADM

## 2018-02-22 RX ORDER — POTASSIUM CHLORIDE 20 MEQ/1
40 TABLET, EXTENDED RELEASE ORAL ONCE
Status: COMPLETED | OUTPATIENT
Start: 2018-02-22 | End: 2018-02-22

## 2018-02-22 RX ADMIN — ENOXAPARIN SODIUM 30 MG: 100 INJECTION SUBCUTANEOUS at 05:02

## 2018-02-22 RX ADMIN — SODIUM CHLORIDE, PRESERVATIVE FREE: 5 INJECTION INTRAVENOUS at 02:02

## 2018-02-22 RX ADMIN — SODIUM CHLORIDE, PRESERVATIVE FREE 5 ML: 5 INJECTION INTRAVENOUS at 05:02

## 2018-02-22 RX ADMIN — PIPERACILLIN AND TAZOBACTAM 4.5 G: 4; .5 INJECTION, POWDER, LYOPHILIZED, FOR SOLUTION INTRAVENOUS; PARENTERAL at 12:02

## 2018-02-22 RX ADMIN — STANDARDIZED SENNA CONCENTRATE AND DOCUSATE SODIUM 1 TABLET: 8.6; 5 TABLET, FILM COATED ORAL at 08:02

## 2018-02-22 RX ADMIN — FUROSEMIDE 20 MG: 10 INJECTION, SOLUTION INTRAMUSCULAR; INTRAVENOUS at 09:02

## 2018-02-22 RX ADMIN — ROSUVASTATIN CALCIUM 20 MG: 20 TABLET, FILM COATED ORAL at 09:02

## 2018-02-22 RX ADMIN — FUROSEMIDE 20 MG: 10 INJECTION, SOLUTION INTRAMUSCULAR; INTRAVENOUS at 05:02

## 2018-02-22 RX ADMIN — PIPERACILLIN AND TAZOBACTAM 4.5 G: 4; .5 INJECTION, POWDER, LYOPHILIZED, FOR SOLUTION INTRAVENOUS; PARENTERAL at 09:02

## 2018-02-22 RX ADMIN — NIFEDIPINE 60 MG: 30 TABLET, FILM COATED, EXTENDED RELEASE ORAL at 09:02

## 2018-02-22 RX ADMIN — STANDARDIZED SENNA CONCENTRATE AND DOCUSATE SODIUM 1 TABLET: 8.6; 5 TABLET, FILM COATED ORAL at 09:02

## 2018-02-22 RX ADMIN — MIRTAZAPINE 7.5 MG: 7.5 TABLET ORAL at 08:02

## 2018-02-22 RX ADMIN — POTASSIUM CHLORIDE 40 MEQ: 1500 TABLET, EXTENDED RELEASE ORAL at 09:02

## 2018-02-22 RX ADMIN — PIPERACILLIN AND TAZOBACTAM 4.5 G: 4; .5 INJECTION, POWDER, LYOPHILIZED, FOR SOLUTION INTRAVENOUS; PARENTERAL at 11:02

## 2018-02-22 RX ADMIN — POLYETHYLENE GLYCOL 3350 17 G: 17 POWDER, FOR SOLUTION ORAL at 09:02

## 2018-02-22 RX ADMIN — MAGNESIUM OXIDE TAB 400 MG (241.3 MG ELEMENTAL MG) 400 MG: 400 (241.3 MG) TAB at 09:02

## 2018-02-22 RX ADMIN — SODIUM CHLORIDE, PRESERVATIVE FREE 5 ML: 5 INJECTION INTRAVENOUS at 08:02

## 2018-02-22 RX ADMIN — DONEPEZIL HYDROCHLORIDE 10 MG: 5 TABLET, FILM COATED ORAL at 09:02

## 2018-02-22 RX ADMIN — PIPERACILLIN AND TAZOBACTAM 4.5 G: 4; .5 INJECTION, POWDER, LYOPHILIZED, FOR SOLUTION INTRAVENOUS; PARENTERAL at 04:02

## 2018-02-22 RX ADMIN — MAGNESIUM OXIDE TAB 400 MG (241.3 MG ELEMENTAL MG) 400 MG: 400 (241.3 MG) TAB at 08:02

## 2018-02-22 NOTE — PROGRESS NOTES
[facility] Hospital Medicine  Progress Note    Patient Name: Delfina Pelletier  MRN: [id number]  Patient Class: IP- Inpatient   Admission Date: 2/20/2018  Length of Stay: 2 days  Attending Physician: Se Valenzuela MD  Primary Care Provider: Dimitri Castellanos MD    Hospital Medicine Team: [facility] HOSP MED 4 Loreto Westbrook MD    Subjective:     Principal Problem:Acute respiratory failure with hypoxia    HPI:  88yo F with dementia, T2DM, history of partial pneumonectomy for TB and recent admit for urosepsis with E. Coli bacteremia, presents on advice of Home Health nurse after she was found to have low O2 sats. Patient herself does not have any complaints. Per daughter (main caregiver) and sister at bedside she has had a non-productive cough over the last few days, no fever or chills. Daughter and sister deny any changes to her mental status (at baseline AAOx1). No chest pain. No abdo pain/ nausea or vomiting. She voids into diaper and daughter says she hasn't noticed any change in urine color or smell when changing her. No dysuria. She was admitted to [facility] from 2/8/2018 to 2/14/2018 with urosepsis with E. Coli bacteremia- received 3 doses of ceftriaxone and discharged on course of ciprofloxacin with Home Health. Daughter says patient has been bed bound since her last admission. During last admission, she was evaluated by SLP who recommended dental soft with nectar thick liquids. Daughter says she has continued on soft diet although she was drinking water at home. Daughter says there have been no signs of cough or regurgitating food/drink. She has sacral and right heel decubitus sores which have remained unchanged since she was discharged.     In ED she was found to have pulse oximetry of 87% on RA, improved to 95% with 2L O2. Her BNP was 88 (lower than on day of discharge on last admit), her procal was 0.46, rapid flu was negative and CXR did not show any convincing focal consolidation. Blood and urine  cultures were drawn, she was given a 30 cc/kg bolus of normal saline and started on vanc and pip/tazo.    Hospital Course:  02/20/2018: Admitted to IM4. Started vanc & pipt/tazo for presumed PNA (HAP vs aspiration).  02/21/2018: Patient with noted hypoxia upon check in the morning (SpO2 in 70-80s) on 2L nasal cannula. Patient was put on non-rebreather with resolution of low O2 saturations. Of note, patient with noted hypoxemia on ABG with pO2 of 43. She otherwise was in no respiratory distress and looked comfortable without use of accessory muscles. Patient remained afebrile without worsening cough. D-dimer elevated but US BLE was negative for DVT, suspect D-dimer elevation related to infection.  02/22/2018: NAEON. Afebrile. Weaned to 4L NC after furosemide, continuing to wean O2. Decompensation yesterday likely related to sepsis bolus.       Review of Systems   Unable to perform ROS: Dementia     Objective:     Vital Signs (Most Recent):  Temp: 98.2 °F (36.8 °C) (02/22/18 0316)  Pulse: 73 (02/22/18 0316)  Resp: 16 (02/22/18 0316)  BP: (!) 125/59 (02/22/18 0316)  SpO2: 100 % (02/22/18 0316) Vital Signs (24h Range):  Temp:  [96.5 °F (35.8 °C)-98.3 °F (36.8 °C)] 98.2 °F (36.8 °C)  Pulse:  [72-85] 73  Resp:  [16-37] 16  SpO2:  [58 %-100 %] 100 %  BP: (125-152)/(57-65) 125/59     Weight: 47.5 kg (104 lb 11.5 oz)  Body mass index is 17.97 kg/m².  No intake or output data in the 24 hours ending 02/22/18 0712   Physical Exam   Constitutional: No distress.   Cachectic bed bound   Cardiovascular: Normal rate, regular rhythm, normal heart sounds and intact distal pulses.    Pulmonary/Chest: Effort normal. No respiratory distress. She has no wheezes. She has rhonchi in the right middle field and the right lower field. She has no rales.   Post pneumonectomy changes of chest wall- left chest wall depressed   Abdominal: Soft. Bowel sounds are normal. She exhibits no distension. There is no tenderness.   Musculoskeletal: She  exhibits edema (L hand edema).   Neurological: She is alert.   Skin: Skin is warm and dry. She is not diaphoretic.       Significant Labs:   ABGs:   Recent Labs  Lab 02/21/18  1154   PH 7.446   PCO2 45.2*   HCO3 31.1*   POCSATURATED 80*   BE 7     Blood Culture:     Recent Labs  Lab 02/20/18  1205 02/20/18  1217   LABBLOO No Growth to date  No Growth to date No Growth to date  No Growth to date     CBC:     Recent Labs  Lab 02/20/18  1205 02/21/18  0414 02/22/18  0415   WBC 12.83* 13.16* 12.73*   HGB 9.9* 9.3* 8.7*   HCT 31.9* 31.5* 29.9*   * 316 331     CMP:     Recent Labs  Lab 02/20/18  1205 02/21/18  0414 02/22/18  0415    142 143   K 4.1 4.1 3.4*    105 104   CO2 29 25 30*   * 123* 138*   BUN 20 16 18   CREATININE 1.0 0.8 0.8   CALCIUM 8.9 8.4* 8.1*   PROT 7.0 6.7 6.3   ALBUMIN 1.9* 1.8* 1.7*   BILITOT 0.2 0.3 0.2   ALKPHOS 95 88 81   AST 44* 41* 30   ALT 42 33 32   ANIONGAP 10 12 9   EGFRNONAA 50.1* >60.0 >60.0     Magnesium:     Recent Labs  Lab 02/20/18  1205 02/21/18  0414 02/22/18  0415   MG 1.9 2.2 1.8     POCT Glucose:     Recent Labs  Lab 02/21/18  1242 02/21/18  2156 02/22/18  0753   POCTGLUCOSE 209* 181* 157*       Significant Imaging:   BILATERAL LOWER EXTREMITY DUPLEX ULTRASOUND 2/21/2018    Technique: The bilateral lower extremity deep venous system was imaged by ultrasound from the groin to the upper calf.  Veins were analyzed with grayscale, transducer compression, color doppler, and doppler spectral analysis.      Comparison: None.    FINDINGS:    RIGHT LEG:  The common femoral, superficial femoral, popliteal, peroneal and anterior and posterior tibial veins show no signs of deep vein thrombosis.  The common femoral, superficial femoral and popliteal veins were examined using spectral analysis and show normal wave forms with normal response to augmentation and respiration.    LEFT LEG:  The common femoral, superficial femoral, popliteal, peroneal and anterior and  posterior tibial veins show no signs of deep vein thrombosis.  The common femoral, superficial femoral and popliteal veins were examined using spectral analysis and show normal wave forms with normal response to augmentation and respiration.    There is a fluid collection in popliteal fossa measuring 2.0 x 0.9 x 2.8 cm, likely a Baker's cyst.   Impression        No evidence of deep venous thrombosis in either lower extremity.    Left popliteal fossa fluid collection, likely a Baker's cyst.       CXR PA & Lateral 2/21/2018  Complete opacification of left hemithorax consistent with previous pneumonectomy.  Cardiomediastinal shift to the left as before.  Small ill-defined opacities and/or subsegmental atelectatic changes noted in the right lung base.  Otherwise the right lung is clear.    No significant pleural effusion     I have reviewed and interpreted all pertinent imaging results/findings within the past 24 hours.    Assessment/Plan:      * Acute respiratory failure with hypoxia    Acute hypoxic respiratory failure in the setting of left pneumonectomy.  - Recently discharged from [facility] after 6 day admission  - Rapid flu negative  - Afebrile, WCC 12  - Procal mildly elevated 0.46  - DDx HAP vs aspiration PNA vs. PE (Wells Criteria for PE is only 1.5)   - LE US negative for DVT, D-dimer elevated but suspect related to infection   - BCx and UCx NGTD  - Respiratory viral panel not sent   - SLP cleared yesterday for thin liquids  - ABG on non-rebreather on 2/21 with pO2 of 43  - CXR yesterday without significant detrimental changes  - Improved overnight after furosemide 20mg IV x2    Plan  - Required NRB yesterday but now with O2 sats of 100% on 4L, will attempt to wean O2 today- goal sats >92%  - Send respiratory viral panel  - Continue vanc and pip/tazo  - Continue furosemide 20mg IV x1 today        Dementia without behavioral disturbance    - Continue home donepezil 10mg qd and mirtazapine 7.5mg qhs  - Delirium  precautions        Alteration in skin integrity    - Wound care consulted, appreciate recs  - Q2 turns  - Heel boot protectors  - Low air loss mattress        History of diabetes mellitus    A1c 2/8/2018 = 5.4   - On metformin at home  - POCT glucose checks AC & HS  - LDSSI for now        Essential hypertension    - Continue home nifedipine 60mg qd        Severe protein-calorie malnutrition    - Mirtazapine 7.5mg qhs        Hyperlipidemia    - Continue rosuvastatin 20mg        Bacteremia due to Escherichia coli    - Secondary to E.coli per last admission.  - Last day of ciprofloxacin on 2/23 - but being treated with Zosyn here which would provide adequate coverage.  - UA demonstrable for clearing infection.   - BCx here NGTD        Deep tissue injury                VTE Risk Mitigation         Ordered     enoxaparin injection 30 mg  Daily     Route:  Subcutaneous        02/20/18 1559     Medium Risk of VTE  Once      02/20/18 1600              Loreto Westbrook MD  Department of Hospital Medicine   [facility]

## 2018-02-22 NOTE — PLAN OF CARE
Problem: Physical Therapy Goal  Goal: Physical Therapy Goal  Goals to be met by: 18     Patient will increase functional independence with mobility by performin. Supine to sit with MInimal Assistance  2. Sit to supine with MInimal Assistance  3. Rolling to Left and Right with Stand-by Assistance.  4. Sit to stand transfer with Maximum Assistance  5. Bed to chair transfer with Maximum Assistance using RW  6. Gait  x 10 feet with Maximum Assistance using Rolling Walker.   7. Sitting at edge of bed x10 minutes with Stand-by Assistance     Goals remain appropriate at time. Continue with PT POC.

## 2018-02-22 NOTE — ASSESSMENT & PLAN NOTE
- Secondary to E.coli per last admission.  - Last day of ciprofloxacin on 2/23 - but being treated with Zosyn here which would provide adequate coverage.  - UA demonstrable for clearing infection.   - BCx here NGTD

## 2018-02-22 NOTE — PROGRESS NOTES
Preform patient home o2 rest and exercise test. Took patient off 2L NC which she was at 99%. She drop to 40% and now is at 96% on the non rebreather. Will continue to monitor.

## 2018-02-22 NOTE — PROGRESS NOTES
Home Oxygen Evaluation    Date Performed: 2/22/2018    1) Patient's Home O2 Sat on room air, while at rest: 40%          If O2 sats on room air at rest are 88% or below, patient qualifies. No additional testing needed. Document N/A in steps 2 and 3. If 89% or above, complete steps 2.      2) Patient's O2 Sat on room air while exercising:N/A        If O2 sats on room air while exercising remain 89% or above patient does not qualify, no further testing needed Document N/A in step 3. If O2 sats on room air while exercising are 88% or below, continue to step 3.      3) Patient's O2 Sat while exercising on O2:N/A at N/A LPM         (Must show improvement from #2 for patients to qualify)    If O2 sats improve on oxygen, patient qualifies for portable oxygen. If not, the patient does not qualify.

## 2018-02-22 NOTE — PLAN OF CARE
[facility]    HOME HEALTH ORDERS  FACE TO FACE ENCOUNTER    Patient Name: Delfina Pelletier  YOB: 1928    PCP: Dimitri Castellanos MD   PCP Address: [address] [facility] / [place] [address]  PCP Phone Number: [phone]  PCP Fax: [phone]    Encounter Date: 02/22/2018    Admit to Home Health    Diagnoses:  Active Hospital Problems    Diagnosis  POA    *Acute respiratory failure with hypoxia [J96.01]  Yes     Priority: 1 - High    Dementia without behavioral disturbance [F03.90]  Yes     Priority: 2     Alteration in skin integrity [R23.9]  Yes     Priority: 3     History of diabetes mellitus [Z86.39]  Yes     Priority: 4     Essential hypertension [I10]  Yes     Priority: 4     Severe protein-calorie malnutrition [E43]  Yes     Priority: 5     Hyperlipidemia [E78.5]  Yes     Priority: 6      Chronic    Deep tissue injury [T14.8XXA]  Yes    Bacteremia due to Escherichia coli [R78.81]  Yes      Resolved Hospital Problems    Diagnosis Date Resolved POA   No resolved problems to display.       Future Appointments  Date Time Provider Department Center   2/23/2018 1:00 PM Dimitri Castellanos MD [facility] [place]           I have seen and examined this patient face to face today. My clinical findings that support the need for the home health skilled services and home bound status are the following:  Weakness/numbness causing balance and gait disturbance due to Infection and Weakness/Debility making it taxing to leave home.    Allergies:Review of patient's allergies indicates:  No Known Allergies    Diet: soft diet    Activities: activity as tolerated    Nursing:   SN to complete comprehensive assessment including routine vital signs. Instruct on disease process and s/s of complications to report to MD. Review/verify medication list sent home with the patient at time of discharge  and instruct patient/caregiver as needed. Frequency may be adjusted depending on start of care  date.    Notify MD if SBP > 160 or < 90; DBP > 90 or < 50; HR > 120 or < 50; Temp > 101; Pulse Oximetry of <88%      CONSULTS:    Physical Therapy to evaluate and treat. Evaluate for home safety and equipment needs; Establish/upgrade home exercise program. Perform / instruct on therapeutic exercises, gait training, transfer training, and Range of Motion.  Occupational Therapy to evaluate and treat. Evaluate home environment for safety and equipment needs. Perform/Instruct on transfers, ADL training, ROM, and therapeutic exercises.  Speech Therapy  to evaluate and treat for  Language, Swallowing and Cognition.   to evaluate for community resources/long-range planning.  Aide to provide assistance with personal care, ADLs, and vital signs.  Telehealth MD consult for home visit in 2 weeks for medication review    MISCELLANEOUS CARE:  Routine Skin for Bedridden Patients: Instruct patient/caregiver to apply moisture barrier cream to all skin folds and wet areas in perineal area daily and after baths and all bowel movements.  Diabetic Care:   SN to perform and educate Diabetic management with blood glucose monitoring:, Fingerstick blood sugar AC and HS and Report CBG < 60 or > 350 to physician.    Home Oxygen at 4L via Nasal Canula    WOUND CARE ORDERS  yes:  See below:  1. Pressure injury prevention interventions to include SUSHANT overlay  2. Triad ointment BID and prn cleaning  3. Foam dressings to R heel, L leg, L ankle    Medications: Review discharge medications with patient and family and provide education.      Current Discharge Medication List      START taking these medications    Details   levoFLOXacin (LEVAQUIN) 250 MG tablet Take 2 tablets today (2/24//2018), then take 1 tablet a day for 2 days. Last dose on Monday 2/26/2018.  Qty: 4 tablet, Refills: 0         CONTINUE these medications which have CHANGED    Details   NIFEdipine (PROCARDIA-XL) 30 MG (OSM) 24 hr tablet Take 1 tablet (30 mg total) by  mouth once daily.  Qty: 30 tablet, Refills: 3         CONTINUE these medications which have NOT CHANGED    Details   blood sugar diagnostic Strp 1 each by Misc.(Non-Drug; Combo Route) route once daily. True Metrix Glucometer Test Strips  Test blood sugar once daily  Qty: 100 each, Refills: 1    Associated Diagnoses: Type 2 diabetes mellitus without complication, unspecified long term insulin use status      donepezil (ARICEPT) 10 MG tablet Take 1 tablet (10 mg total) by mouth once daily.  Qty: 90 tablet, Refills: 3    Associated Diagnoses: Dementia without behavioral disturbance, unspecified dementia type      lancets Misc 1 each by Misc.(Non-Drug; Combo Route) route once daily. TRUE METRIX METER  Qty: 100 each, Refills: 1    Associated Diagnoses: Type 2 diabetes mellitus without complication, unspecified long term insulin use status      metFORMIN (GLUCOPHAGE) 500 MG tablet Take 1 tablet (500 mg total) by mouth 2 (two) times daily with meals.  Qty: 180 tablet, Refills: 3      mirtazapine (REMERON) 7.5 MG Tab Take 1 tablet (7.5 mg total) by mouth every evening.  Qty: 60 tablet, Refills: 4      rosuvastatin (CRESTOR) 20 MG tablet Take 1 tablet (20 mg total) by mouth once daily.  Qty: 90 tablet, Refills: 3    Associated Diagnoses: Combined hyperlipidemia associated with type 2 diabetes mellitus         STOP taking these medications       ciprofloxacin HCl (CIPRO) 500 MG tablet Comments:   Reason for Stopping:               I certify that this patient is confined to her home and needs intermittent skilled nursing care, physical therapy, speech therapy and occupational therapy.

## 2018-02-22 NOTE — PT/OT/SLP PROGRESS
Speech Language Pathology      Delfina Pelletier   1102/1102 A    MRN: [id number]    Patient not seen today secondary to Patient fatigue (SLP attempted to see pt at 1054 this date. Despite turning on lights in pt's room, raising HOB, cool damn cloth applied to face, gentle shoulder and sternal rub, and extensive verbal stimulation, pt somnolent ). Will follow-up according to SLP POC if pt medically stable and available.    DONTE Duron, CCC-SLP  [phone]  2/22/2018

## 2018-02-22 NOTE — SUBJECTIVE & OBJECTIVE
Review of Systems   Unable to perform ROS: Dementia     Objective:     Vital Signs (Most Recent):  Temp: 98.2 °F (36.8 °C) (02/22/18 0316)  Pulse: 73 (02/22/18 0316)  Resp: 16 (02/22/18 0316)  BP: (!) 125/59 (02/22/18 0316)  SpO2: 100 % (02/22/18 0316) Vital Signs (24h Range):  Temp:  [96.5 °F (35.8 °C)-98.3 °F (36.8 °C)] 98.2 °F (36.8 °C)  Pulse:  [72-85] 73  Resp:  [16-37] 16  SpO2:  [58 %-100 %] 100 %  BP: (125-152)/(57-65) 125/59     Weight: 47.5 kg (104 lb 11.5 oz)  Body mass index is 17.97 kg/m².  No intake or output data in the 24 hours ending 02/22/18 0712   Physical Exam   Constitutional: No distress.   Cachectic bed bound   Cardiovascular: Normal rate, regular rhythm, normal heart sounds and intact distal pulses.    Pulmonary/Chest: Effort normal. No respiratory distress. She has no wheezes. She has rhonchi in the right middle field and the right lower field. She has no rales.   Post pneumonectomy changes of chest wall- left chest wall depressed   Abdominal: Soft. Bowel sounds are normal. She exhibits no distension. There is no tenderness.   Musculoskeletal: She exhibits edema (L hand edema).   Neurological: She is alert.   Skin: Skin is warm and dry. She is not diaphoretic.       Significant Labs:   ABGs:   Recent Labs  Lab 02/21/18  1154   PH 7.446   PCO2 45.2*   HCO3 31.1*   POCSATURATED 80*   BE 7     Blood Culture:     Recent Labs  Lab 02/20/18  1205 02/20/18  1217   LABBLOO No Growth to date  No Growth to date No Growth to date  No Growth to date     CBC:     Recent Labs  Lab 02/20/18  1205 02/21/18  0414 02/22/18  0415   WBC 12.83* 13.16* 12.73*   HGB 9.9* 9.3* 8.7*   HCT 31.9* 31.5* 29.9*   * 316 331     CMP:     Recent Labs  Lab 02/20/18  1205 02/21/18  0414 02/22/18  0415    142 143   K 4.1 4.1 3.4*    105 104   CO2 29 25 30*   * 123* 138*   BUN 20 16 18   CREATININE 1.0 0.8 0.8   CALCIUM 8.9 8.4* 8.1*   PROT 7.0 6.7 6.3   ALBUMIN 1.9* 1.8* 1.7*   BILITOT 0.2 0.3 0.2    ALKPHOS 95 88 81   AST 44* 41* 30   ALT 42 33 32   ANIONGAP 10 12 9   EGFRNONAA 50.1* >60.0 >60.0     Magnesium:     Recent Labs  Lab 02/20/18  1205 02/21/18  0414 02/22/18  0415   MG 1.9 2.2 1.8     POCT Glucose:     Recent Labs  Lab 02/21/18  1242 02/21/18  2156 02/22/18  0753   POCTGLUCOSE 209* 181* 157*       Significant Imaging:   BILATERAL LOWER EXTREMITY DUPLEX ULTRASOUND 2/21/2018    Technique: The bilateral lower extremity deep venous system was imaged by ultrasound from the groin to the upper calf.  Veins were analyzed with grayscale, transducer compression, color doppler, and doppler spectral analysis.      Comparison: None.    FINDINGS:    RIGHT LEG:  The common femoral, superficial femoral, popliteal, peroneal and anterior and posterior tibial veins show no signs of deep vein thrombosis.  The common femoral, superficial femoral and popliteal veins were examined using spectral analysis and show normal wave forms with normal response to augmentation and respiration.    LEFT LEG:  The common femoral, superficial femoral, popliteal, peroneal and anterior and posterior tibial veins show no signs of deep vein thrombosis.  The common femoral, superficial femoral and popliteal veins were examined using spectral analysis and show normal wave forms with normal response to augmentation and respiration.    There is a fluid collection in popliteal fossa measuring 2.0 x 0.9 x 2.8 cm, likely a Baker's cyst.   Impression        No evidence of deep venous thrombosis in either lower extremity.    Left popliteal fossa fluid collection, likely a Baker's cyst.       CXR PA & Lateral 2/21/2018  Complete opacification of left hemithorax consistent with previous pneumonectomy.  Cardiomediastinal shift to the left as before.  Small ill-defined opacities and/or subsegmental atelectatic changes noted in the right lung base.  Otherwise the right lung is clear.    No significant pleural effusion     I have reviewed and  interpreted all pertinent imaging results/findings within the past 24 hours.

## 2018-02-22 NOTE — ASSESSMENT & PLAN NOTE
- Wound care consulted, appreciate recs  - Q2 turns  - Heel boot protectors  - Low air loss mattress

## 2018-02-22 NOTE — PT/OT/SLP PROGRESS
Physical Therapy Treatment    Patient Name:  Delfina Pelletier   MRN:  [id number]    Recommendations:     Discharge Recommendations:  home health PT   Discharge Equipment Recommendations: wheelchair   Barriers to discharge: None    Assessment:     Delfina Pelletier is a 89 y.o. female admitted with a medical diagnosis of Acute respiratory failure with hypoxia.  She presents with the following impairments/functional limitations:  weakness, impaired endurance, impaired self care skills, impaired functional mobilty, gait instability, impaired balance, decreased lower extremity function, decreased safety awareness, impaired cognition. Pt agreed to B LE therex only on this date to improve B LE strength and endurance to help improve her overall functional mobility. Pt will continue to benefit from PT services at this time. Continue with PT POC as indicated.     Rehab Prognosis:  fair; patient would benefit from acute skilled PT services to address these deficits and reach maximum level of function.      Recent Surgery: * No surgery found *      Plan:     During this hospitalization, patient to be seen 3 x/week to address the above listed problems via gait training, therapeutic activities, therapeutic exercises, neuromuscular re-education  · Plan of Care Expires:  03/23/18   Plan of Care Reviewed with: patient, family    Subjective     Communicated with nursing prior to session.  Patient found supine with all lines intact upon PT entry to room, agreeable to treatment.      Chief Complaint: none stated  Patient comments/goals: none stated  Pain/Comfort:  · Pain Rating 1: 0/10  · Pain Rating Post-Intervention 1: 0/10    Patients cultural, spiritual, Roman Catholic conflicts given the current situation: none    Objective:     Patient found with:  (all lines intact)     General Precautions: Standard, fall, aspiration   Orthopedic Precautions:N/A   Braces: N/A     Functional Mobility:  · Pt declined to perform on this date.       AM-PAC 6  CLICK MOBILITY  Turning over in bed (including adjusting bedclothes, sheets and blankets)?: 2  Sitting down on and standing up from a chair with arms (e.g., wheelchair, bedside commode, etc.): 1  Moving from lying on back to sitting on the side of the bed?: 2  Moving to and from a bed to a chair (including a wheelchair)?: 1  Need to walk in hospital room?: 1  Climbing 3-5 steps with a railing?: 1  Total Score: 8       Therapeutic Activities and Exercises:   -B LE therex 2x15 reps with assistance to improve overall B LE strength, endurance, and ROM.     Patient left supine with all lines intact, call button in reach and family present..    GOALS:    Physical Therapy Goals        Problem: Physical Therapy Goal    Goal Priority Disciplines Outcome Goal Variances Interventions   Physical Therapy Goal     PT/OT, PT Ongoing (interventions implemented as appropriate)     Description:  Goals to be met by: 18     Patient will increase functional independence with mobility by performin. Supine to sit with MInimal Assistance  2. Sit to supine with MInimal Assistance  3. Rolling to Left and Right with Stand-by Assistance.  4. Sit to stand transfer with Maximum Assistance  5. Bed to chair transfer with Maximum Assistance using RW  6. Gait  x 10 feet with Maximum Assistance using Rolling Walker.   7. Sitting at edge of bed x10 minutes with Stand-by Assistance                      Time Tracking:     PT Received On: 18  PT Start Time: 1540     PT Stop Time: 1559  PT Total Time (min): 19 min     Billable Minutes: Therapeutic Exercise 19    Treatment Type: Treatment  PT/PTA: PTA     PTA Visit Number: 1     Meghan Conway PTA  2018

## 2018-02-23 PROBLEM — R78.81 BACTEREMIA DUE TO ESCHERICHIA COLI: Chronic | Status: ACTIVE | Noted: 2018-02-09

## 2018-02-23 PROBLEM — B96.20 BACTEREMIA DUE TO ESCHERICHIA COLI: Chronic | Status: ACTIVE | Noted: 2018-02-09

## 2018-02-23 LAB
ABO + RH BLD: NORMAL
ALBUMIN SERPL BCP-MCNC: 1.5 G/DL
ALP SERPL-CCNC: 72 U/L
ALT SERPL W/O P-5'-P-CCNC: 29 U/L
ANION GAP SERPL CALC-SCNC: 9 MMOL/L
AST SERPL-CCNC: 26 U/L
BASOPHILS # BLD AUTO: 0.03 K/UL
BASOPHILS # BLD AUTO: 0.04 K/UL
BASOPHILS NFR BLD: 0.3 %
BASOPHILS NFR BLD: 0.4 %
BILIRUB SERPL-MCNC: 0.3 MG/DL
BLD GP AB SCN CELLS X3 SERPL QL: NORMAL
BLD PROD TYP BPU: NORMAL
BLOOD UNIT EXPIRATION DATE: NORMAL
BLOOD UNIT TYPE CODE: 5100
BLOOD UNIT TYPE: NORMAL
BNP SERPL-MCNC: 132 PG/ML
BUN SERPL-MCNC: 17 MG/DL
CALCIUM SERPL-MCNC: 8 MG/DL
CHLORIDE SERPL-SCNC: 102 MMOL/L
CO2 SERPL-SCNC: 30 MMOL/L
CODING SYSTEM: NORMAL
CREAT SERPL-MCNC: 0.9 MG/DL
DIFFERENTIAL METHOD: ABNORMAL
DIFFERENTIAL METHOD: ABNORMAL
DISPENSE STATUS: NORMAL
EOSINOPHIL # BLD AUTO: 0.1 K/UL
EOSINOPHIL # BLD AUTO: 0.1 K/UL
EOSINOPHIL NFR BLD: 0.9 %
EOSINOPHIL NFR BLD: 1.2 %
ERYTHROCYTE [DISTWIDTH] IN BLOOD BY AUTOMATED COUNT: 14.2 %
ERYTHROCYTE [DISTWIDTH] IN BLOOD BY AUTOMATED COUNT: 14.3 %
EST. GFR  (AFRICAN AMERICAN): >60 ML/MIN/1.73 M^2
EST. GFR  (NON AFRICAN AMERICAN): 56.9 ML/MIN/1.73 M^2
ESTIMATED PA SYSTOLIC PRESSURE: 49.24
GLUCOSE SERPL-MCNC: 107 MG/DL
HCT VFR BLD AUTO: 24.9 %
HCT VFR BLD AUTO: 26.3 %
HGB BLD-MCNC: 7.4 G/DL
HGB BLD-MCNC: 7.6 G/DL
IMM GRANULOCYTES # BLD AUTO: 0.05 K/UL
IMM GRANULOCYTES # BLD AUTO: 0.07 K/UL
IMM GRANULOCYTES NFR BLD AUTO: 0.5 %
IMM GRANULOCYTES NFR BLD AUTO: 0.7 %
LYMPHOCYTES # BLD AUTO: 1.1 K/UL
LYMPHOCYTES # BLD AUTO: 1.3 K/UL
LYMPHOCYTES NFR BLD: 10.8 %
LYMPHOCYTES NFR BLD: 12.8 %
MAGNESIUM SERPL-MCNC: 1.6 MG/DL
MCH RBC QN AUTO: 27.2 PG
MCH RBC QN AUTO: 27.9 PG
MCHC RBC AUTO-ENTMCNC: 28.9 G/DL
MCHC RBC AUTO-ENTMCNC: 29.7 G/DL
MCV RBC AUTO: 94 FL
MCV RBC AUTO: 94 FL
MITRAL VALVE REGURGITATION: ABNORMAL
MONOCYTES # BLD AUTO: 0.7 K/UL
MONOCYTES # BLD AUTO: 0.9 K/UL
MONOCYTES NFR BLD: 6.4 %
MONOCYTES NFR BLD: 8.5 %
NEUTROPHILS # BLD AUTO: 7.8 K/UL
NEUTROPHILS # BLD AUTO: 8.3 K/UL
NEUTROPHILS NFR BLD: 76.5 %
NEUTROPHILS NFR BLD: 81 %
NRBC BLD-RTO: 0 /100 WBC
NRBC BLD-RTO: 0 /100 WBC
NUM UNITS TRANS PACKED RBC: NORMAL
PHOSPHATE SERPL-MCNC: 2.1 MG/DL
PLATELET # BLD AUTO: 297 K/UL
PLATELET # BLD AUTO: 325 K/UL
PMV BLD AUTO: 10.8 FL
PMV BLD AUTO: 11.3 FL
POCT GLUCOSE: 107 MG/DL (ref 70–110)
POCT GLUCOSE: 112 MG/DL (ref 70–110)
POCT GLUCOSE: 126 MG/DL (ref 70–110)
POCT GLUCOSE: 145 MG/DL (ref 70–110)
POTASSIUM SERPL-SCNC: 4.4 MMOL/L
PROT SERPL-MCNC: 5.6 G/DL
RBC # BLD AUTO: 2.65 M/UL
RBC # BLD AUTO: 2.79 M/UL
RETIRED EF AND QEF - SEE NOTES: 60 (ref 55–65)
SODIUM SERPL-SCNC: 141 MMOL/L
TRICUSPID VALVE REGURGITATION: ABNORMAL
WBC # BLD AUTO: 10.23 K/UL
WBC # BLD AUTO: 10.26 K/UL

## 2018-02-23 PROCEDURE — 94761 N-INVAS EAR/PLS OXIMETRY MLT: CPT

## 2018-02-23 PROCEDURE — 92526 ORAL FUNCTION THERAPY: CPT

## 2018-02-23 PROCEDURE — 25000003 PHARM REV CODE 250: Performed by: STUDENT IN AN ORGANIZED HEALTH CARE EDUCATION/TRAINING PROGRAM

## 2018-02-23 PROCEDURE — 27000221 HC OXYGEN, UP TO 24 HOURS

## 2018-02-23 PROCEDURE — 93306 TTE W/DOPPLER COMPLETE: CPT

## 2018-02-23 PROCEDURE — 99900035 HC TECH TIME PER 15 MIN (STAT)

## 2018-02-23 PROCEDURE — P9016 RBC LEUKOCYTES REDUCED: HCPCS

## 2018-02-23 PROCEDURE — 99233 SBSQ HOSP IP/OBS HIGH 50: CPT | Mod: ,,, | Performed by: INTERNAL MEDICINE

## 2018-02-23 PROCEDURE — 86920 COMPATIBILITY TEST SPIN: CPT

## 2018-02-23 PROCEDURE — 85025 COMPLETE CBC W/AUTO DIFF WBC: CPT

## 2018-02-23 PROCEDURE — 36430 TRANSFUSION BLD/BLD COMPNT: CPT

## 2018-02-23 PROCEDURE — A4216 STERILE WATER/SALINE, 10 ML: HCPCS | Performed by: STUDENT IN AN ORGANIZED HEALTH CARE EDUCATION/TRAINING PROGRAM

## 2018-02-23 PROCEDURE — 86850 RBC ANTIBODY SCREEN: CPT

## 2018-02-23 PROCEDURE — 36415 COLL VENOUS BLD VENIPUNCTURE: CPT

## 2018-02-23 PROCEDURE — 84100 ASSAY OF PHOSPHORUS: CPT

## 2018-02-23 PROCEDURE — 63600175 PHARM REV CODE 636 W HCPCS: Performed by: STUDENT IN AN ORGANIZED HEALTH CARE EDUCATION/TRAINING PROGRAM

## 2018-02-23 PROCEDURE — 83735 ASSAY OF MAGNESIUM: CPT

## 2018-02-23 PROCEDURE — 83880 ASSAY OF NATRIURETIC PEPTIDE: CPT

## 2018-02-23 PROCEDURE — 80053 COMPREHEN METABOLIC PANEL: CPT

## 2018-02-23 PROCEDURE — 93306 TTE W/DOPPLER COMPLETE: CPT | Mod: 26,,, | Performed by: INTERNAL MEDICINE

## 2018-02-23 PROCEDURE — 11000001 HC ACUTE MED/SURG PRIVATE ROOM

## 2018-02-23 RX ORDER — FUROSEMIDE 10 MG/ML
20 INJECTION INTRAMUSCULAR; INTRAVENOUS ONCE
Status: COMPLETED | OUTPATIENT
Start: 2018-02-23 | End: 2018-02-23

## 2018-02-23 RX ORDER — SODIUM,POTASSIUM PHOSPHATES 280-250MG
2 POWDER IN PACKET (EA) ORAL
Status: DISCONTINUED | OUTPATIENT
Start: 2018-02-23 | End: 2018-02-23

## 2018-02-23 RX ORDER — HYDROCODONE BITARTRATE AND ACETAMINOPHEN 500; 5 MG/1; MG/1
TABLET ORAL
Status: DISCONTINUED | OUTPATIENT
Start: 2018-02-23 | End: 2018-02-24 | Stop reason: HOSPADM

## 2018-02-23 RX ORDER — FAMOTIDINE 20 MG/1
40 TABLET, FILM COATED ORAL DAILY
Status: DISCONTINUED | OUTPATIENT
Start: 2018-02-23 | End: 2018-02-23

## 2018-02-23 RX ORDER — FAMOTIDINE 20 MG/1
20 TABLET, FILM COATED ORAL 2 TIMES DAILY
Status: DISCONTINUED | OUTPATIENT
Start: 2018-02-23 | End: 2018-02-24

## 2018-02-23 RX ORDER — SODIUM,POTASSIUM PHOSPHATES 280-250MG
2 POWDER IN PACKET (EA) ORAL
Status: COMPLETED | OUTPATIENT
Start: 2018-02-23 | End: 2018-02-23

## 2018-02-23 RX ORDER — NIFEDIPINE 30 MG/1
30 TABLET, EXTENDED RELEASE ORAL DAILY
Status: DISCONTINUED | OUTPATIENT
Start: 2018-02-24 | End: 2018-02-24 | Stop reason: HOSPADM

## 2018-02-23 RX ORDER — FAMOTIDINE 20 MG/1
20 TABLET, FILM COATED ORAL DAILY
Status: DISCONTINUED | OUTPATIENT
Start: 2018-02-23 | End: 2018-02-23

## 2018-02-23 RX ORDER — AMOXICILLIN AND CLAVULANATE POTASSIUM 875; 125 MG/1; MG/1
1 TABLET, FILM COATED ORAL EVERY 12 HOURS
Status: DISCONTINUED | OUTPATIENT
Start: 2018-02-23 | End: 2018-02-24 | Stop reason: HOSPADM

## 2018-02-23 RX ORDER — FUROSEMIDE 10 MG/ML
20 INJECTION INTRAMUSCULAR; INTRAVENOUS 2 TIMES DAILY
Status: ACTIVE | OUTPATIENT
Start: 2018-02-23 | End: 2018-02-24

## 2018-02-23 RX ADMIN — FAMOTIDINE 20 MG: 20 TABLET, FILM COATED ORAL at 09:02

## 2018-02-23 RX ADMIN — FAMOTIDINE 20 MG: 20 TABLET, FILM COATED ORAL at 11:02

## 2018-02-23 RX ADMIN — FUROSEMIDE 20 MG: 10 INJECTION, SOLUTION INTRAMUSCULAR; INTRAVENOUS at 09:02

## 2018-02-23 RX ADMIN — POLYETHYLENE GLYCOL 3350 17 G: 17 POWDER, FOR SOLUTION ORAL at 11:02

## 2018-02-23 RX ADMIN — SODIUM CHLORIDE, PRESERVATIVE FREE 5 ML: 5 INJECTION INTRAVENOUS at 06:02

## 2018-02-23 RX ADMIN — SODIUM CHLORIDE, PRESERVATIVE FREE 5 ML: 5 INJECTION INTRAVENOUS at 05:02

## 2018-02-23 RX ADMIN — POTASSIUM & SODIUM PHOSPHATES POWDER PACK 280-160-250 MG 2 PACKET: 280-160-250 PACK at 09:02

## 2018-02-23 RX ADMIN — SODIUM CHLORIDE, PRESERVATIVE FREE 5 ML: 5 INJECTION INTRAVENOUS at 09:02

## 2018-02-23 RX ADMIN — AMOXICILLIN AND CLAVULANATE POTASSIUM 1 TABLET: 875; 125 TABLET, FILM COATED ORAL at 09:02

## 2018-02-23 RX ADMIN — FUROSEMIDE 20 MG: 10 INJECTION, SOLUTION INTRAMUSCULAR; INTRAVENOUS at 11:02

## 2018-02-23 RX ADMIN — AMOXICILLIN AND CLAVULANATE POTASSIUM 1 TABLET: 875; 125 TABLET, FILM COATED ORAL at 11:02

## 2018-02-23 RX ADMIN — STANDARDIZED SENNA CONCENTRATE AND DOCUSATE SODIUM 1 TABLET: 8.6; 5 TABLET, FILM COATED ORAL at 11:02

## 2018-02-23 RX ADMIN — ROSUVASTATIN CALCIUM 20 MG: 20 TABLET, FILM COATED ORAL at 11:02

## 2018-02-23 RX ADMIN — POTASSIUM & SODIUM PHOSPHATES POWDER PACK 280-160-250 MG 2 PACKET: 280-160-250 PACK at 11:02

## 2018-02-23 RX ADMIN — MAGNESIUM OXIDE TAB 400 MG (241.3 MG ELEMENTAL MG) 400 MG: 400 (241.3 MG) TAB at 11:02

## 2018-02-23 RX ADMIN — MIRTAZAPINE 7.5 MG: 7.5 TABLET ORAL at 09:02

## 2018-02-23 RX ADMIN — MAGNESIUM OXIDE TAB 400 MG (241.3 MG ELEMENTAL MG) 400 MG: 400 (241.3 MG) TAB at 09:02

## 2018-02-23 RX ADMIN — STANDARDIZED SENNA CONCENTRATE AND DOCUSATE SODIUM 1 TABLET: 8.6; 5 TABLET, FILM COATED ORAL at 09:02

## 2018-02-23 RX ADMIN — POTASSIUM & SODIUM PHOSPHATES POWDER PACK 280-160-250 MG 2 PACKET: 280-160-250 PACK at 06:02

## 2018-02-23 RX ADMIN — ENOXAPARIN SODIUM 30 MG: 100 INJECTION SUBCUTANEOUS at 06:02

## 2018-02-23 RX ADMIN — DONEPEZIL HYDROCHLORIDE 10 MG: 5 TABLET, FILM COATED ORAL at 11:02

## 2018-02-23 NOTE — PLAN OF CARE
Covering SW sent home health orders to [facility] to resume services. SW informed [facility] that patient will likely discharge tomorrow. MARIA C also sent home health orders and oxygen orders to N to be arranged.     Tania Mcmahon LMSW  [facility]

## 2018-02-23 NOTE — PT/OT/SLP PROGRESS
"Speech Language Pathology Treatment    Patient Name:  Delfina Pelletier   MRN:  [id number]   1102/1102 A    Admitting Diagnosis: Acute respiratory failure with hypoxia    Recommendations:                 General Recommendations:  Dysphagia therapy  Diet recommendations:  Puree, Liquid Diet Level: Thin   Aspiration Precautions:   · PO meds crushed in pureed  · Fully awake and alert for PO intake  · Fully upright position for PO intake  · Small bites/ sips  · Slow rate of eating/ drinking  · 1 bite/ sip @ a time  · Refrain from talking prior to swallow completion   · Remain upright for 20-30 min post PO intake  · Discontinue PO upon: coughing, throat clearing, choking, wet vocal quality, wet vocal quality, watery eyes, reddened facial features  General Precautions: Standard, aspiration, pureed diet, fall, respiratory    If medical team is concerned re: silent aspiration, recommend MBS to be completed Monday 2/26 as silent aspiration unable to be ruled out at the bedside.     Subjective     "Hey."     Pain/Comfort:  · Pain Rating 1: 0/10  · Pain Rating Post-Intervention 1: 0/10    Objective:     Has the patient been evaluated by SLP for swallowing?   Yes  Keep patient NPO? No   Current Respiratory Status: nasal cannula      Pt asleep upon entry with sister at bedside. Easily awakened with light verbal stimulation. HOB raised. Pt observed with 6oz water via multiple cup and straw sips and multiple 1/2 tsp pureed chocolate pudding. Oral phased appeared WFL and no overt clinical signs of aspiration observed. Note: silent aspiration unable to be ruled out at the bedside. 2/2 pt's sister's report of pt falling asleep while eating dental soft consistencies, recommend diet downgrade to pureed. Education provided re: aspiration precautions listed above and updated SLP POC. Pt and her sister verbalized understanding of all education provided and agreement with SLP POC. White board updated. No further questions.     Medical team paged " to recommend diet downgrade to pureed. However awaiting return call.     Assessment:     Delfina Pelletier is a 89 y.o. female with an SLP diagnosis of dysphagia.     Goals:    SLP Goals        Problem: SLP Goal    Goal Priority Disciplines Outcome   SLP Goal     SLP Ongoing (interventions implemented as appropriate)   Description:  Speech Language Pathology Goals  Goals expected to be met by 2/28:  1. Pt will tolerate a dental soft diet and thin liquids without overt s/s of aspiration.                        Plan:     · Patient to be seen:  3 x/week   · Plan of Care expires:  03/21/18  · Plan of Care reviewed with:  patient, sibling   · SLP Follow-Up:  Yes       Discharge recommendations:  home with home health     Time Tracking:     SLP Treatment Date:   02/23/18  Speech Start Time:  1359  Speech Stop Time:  1411     Speech Total Time (min):  12 min    Billable Minutes: Treatment Swallowing Dysfunction 12    DONTE Duron, CCC-SLP  [phone]  2/23/2018

## 2018-02-23 NOTE — ASSESSMENT & PLAN NOTE
- Secondary to E.coli per last admission.  - Last day of ciprofloxacin on 2/23 - but being treated with pip/tazo here which would provide adequate coverage.  - UA demonstrable for clearing infection.   - BCx here NGTD

## 2018-02-23 NOTE — PLAN OF CARE
Problem: SLP Goal  Goal: SLP Goal  Speech Language Pathology Goals  Goals expected to be met by 2/28:  1. Pt will tolerate a dental soft diet and thin liquids without overt s/s of aspiration.       Outcome: Ongoing (interventions implemented as appropriate)  Recommend ongoing thin liquids. However recommend diet downgrade to pureed consistencies. Strict aspiration precautions. If medical team concerned re: aspiration, recommend MBSS to be completed 2/26/18 as silent aspiration unable to be ruled out at the bedside.     DONTE Duron, CCC-SLP  [phone]  2/23/2018

## 2018-02-23 NOTE — SUBJECTIVE & OBJECTIVE
Review of Systems   Unable to perform ROS: Dementia     Objective:     Vital Signs (Most Recent):  Temp: 98.7 °F (37.1 °C) (02/23/18 0809)  Pulse: 72 (02/23/18 0809)  Resp: 20 (02/23/18 0809)  BP: (!) 107/55 (02/23/18 0809)  SpO2: 100 % (02/23/18 0809) Vital Signs (24h Range):  Temp:  [97 °F (36.1 °C)-99.3 °F (37.4 °C)] 98.7 °F (37.1 °C)  Pulse:  [67-90] 72  Resp:  [16-24] 20  SpO2:  [96 %-100 %] 100 %  BP: (106-116)/(50-58) 107/55     Weight: 47.5 kg (104 lb 11.5 oz)  Body mass index is 17.97 kg/m².  No intake or output data in the 24 hours ending 02/23/18 0835   Physical Exam   Constitutional: No distress.   Cachectic bed bound   Cardiovascular: Normal rate, regular rhythm, normal heart sounds and intact distal pulses.    Pulmonary/Chest: Effort normal. No respiratory distress. She has no wheezes. She has rhonchi in the right middle field and the right lower field. She has no rales.   Post pneumonectomy changes of chest wall- left chest wall depressed   Abdominal: Soft. Bowel sounds are normal. She exhibits no distension. There is no tenderness.   Musculoskeletal: She exhibits edema (improving UE).   Neurological: She is alert.   Skin: Skin is warm and dry. She is not diaphoretic.       Significant Labs:   CBC:     Recent Labs  Lab 02/22/18 0415 02/23/18 0336   WBC 12.73* 10.23   HGB 8.7* 7.4*   HCT 29.9* 24.9*    297     CMP:     Recent Labs  Lab 02/22/18 0415 02/23/18  0336    141   K 3.4* 4.4    102   CO2 30* 30*   * 107   BUN 18 17   CREATININE 0.8 0.9   CALCIUM 8.1* 8.0*   PROT 6.3 5.6*   ALBUMIN 1.7* 1.5*   BILITOT 0.2 0.3   ALKPHOS 81 72   AST 30 26   ALT 32 29   ANIONGAP 9 9   EGFRNONAA >60.0 56.9*     Magnesium:     Recent Labs  Lab 02/22/18  0415 02/23/18  0336   MG 1.8 1.6     POCT Glucose:     Recent Labs  Lab 02/22/18  1156 02/22/18  1652 02/22/18  2030   POCTGLUCOSE 169* 195* 159*       Significant Imaging:   BILATERAL LOWER EXTREMITY DUPLEX ULTRASOUND  2/21/2018    Technique: The bilateral lower extremity deep venous system was imaged by ultrasound from the groin to the upper calf.  Veins were analyzed with grayscale, transducer compression, color doppler, and doppler spectral analysis.      Comparison: None.    FINDINGS:    RIGHT LEG:  The common femoral, superficial femoral, popliteal, peroneal and anterior and posterior tibial veins show no signs of deep vein thrombosis.  The common femoral, superficial femoral and popliteal veins were examined using spectral analysis and show normal wave forms with normal response to augmentation and respiration.    LEFT LEG:  The common femoral, superficial femoral, popliteal, peroneal and anterior and posterior tibial veins show no signs of deep vein thrombosis.  The common femoral, superficial femoral and popliteal veins were examined using spectral analysis and show normal wave forms with normal response to augmentation and respiration.    There is a fluid collection in popliteal fossa measuring 2.0 x 0.9 x 2.8 cm, likely a Baker's cyst.   Impression        No evidence of deep venous thrombosis in either lower extremity.    Left popliteal fossa fluid collection, likely a Baker's cyst.       CXR PA & Lateral 2/21/2018  Complete opacification of left hemithorax consistent with previous pneumonectomy.  Cardiomediastinal shift to the left as before.  Small ill-defined opacities and/or subsegmental atelectatic changes noted in the right lung base.  Otherwise the right lung is clear.    No significant pleural effusion     I have reviewed and interpreted all pertinent imaging results/findings within the past 24 hours.

## 2018-02-23 NOTE — ASSESSMENT & PLAN NOTE
Acute hypoxic respiratory failure in the setting of left pneumonectomy.  - [place] [facility] [place]  - Rapid flu negative  - Afebrile, WCC 12  - Procal mildly elevated 0.46  - DDx HAP vs aspiration PNA vs. Pulmonary HTN vs ADCHF  - LE US negative for DVT, D-dimer elevated but suspect related to infection   - BCx and UCx NGTD  - Respiratory viral panel not sent   - SLP cleared yesterday for thin liquids  - ABG on non-rebreather on 2/21 with pO2 of 43  - CXR yesterday without significant detrimental changes  - Improved overnight after furosemide 20mg IV x2    Plan  - Still requiring 4L, qualified for home O2  - Send respiratory viral panel  - Continue pip/tazo  - BNP increased today to 132, continue furosemide 20mg IV BID  - Will also check echo

## 2018-02-23 NOTE — PROGRESS NOTES
[facility] Hospital Medicine  Progress Note    Patient Name: Delfina Pelletier  MRN: [id number]  Patient Class: IP- Inpatient   Admission Date: 2/20/2018  Length of Stay: 3 days  Attending Physician: Se Valenzuela MD  Primary Care Provider: Dimitri Castellanos MD    Hospital Medicine Team: [facility] HOSP MED 4 Loreto Westbrook MD    Subjective:     Principal Problem:Acute respiratory failure with hypoxia    HPI:  89yo F with dementia, T2DM, history of partial pneumonectomy for TB and recent admit for urosepsis with E. Coli bacteremia, presents on advice of Home Health nurse after she was found to have low O2 sats. Patient herself does not have any complaints. Per daughter (main caregiver) and sister at bedside she has had a non-productive cough over the last few days, no fever or chills. Daughter and sister deny any changes to her mental status (at baseline AAOx1). No chest pain. No abdo pain/ nausea or vomiting. She voids into diaper and daughter says she hasn't noticed any change in urine color or smell when changing her. No dysuria. She was admitted to [facility] from 2/8/2018 to 2/14/2018 with urosepsis with E. Coli bacteremia- received 3 doses of ceftriaxone and discharged on course of ciprofloxacin with Home Health. Daughter says patient has been bed bound since her last admission. During last admission, she was evaluated by SLP who recommended dental soft with nectar thick liquids. Daughter says she has continued on soft diet although she was drinking water at home. Daughter says there have been no signs of cough or regurgitating food/drink. She has sacral and right heel decubitus sores which have remained unchanged since she was discharged.     In ED she was found to have pulse oximetry of 87% on RA, improved to 95% with 2L O2. Her BNP was 88 (lower than on day of discharge on last admit), her procal was 0.46, rapid flu was negative and CXR did not show any convincing focal consolidation. Blood and urine  cultures were drawn, she was given a 30 cc/kg bolus of normal saline and started on vanc and pip/tazo.    Hospital Course:  02/20/2018: Admitted to IM4. Started vanc & pipt/tazo for presumed PNA (HAP vs aspiration).  02/21/2018: Patient with noted hypoxia upon check in the morning (SpO2 in 70-80s) on 2L nasal cannula. Patient was put on non-rebreather with resolution of low O2 saturations. Of note, patient with noted hypoxemia on ABG with pO2 of 43. She otherwise was in no respiratory distress and looked comfortable without use of accessory muscles. Patient remained afebrile without worsening cough. D-dimer elevated but US BLE was negative for DVT, suspect D-dimer elevation related to infection.  02/22/2018: NAEON. Afebrile. Weaned to 4L NC after furosemide, continuing to wean O2. Decompensation yesterday likely related to sepsis bolus.   02/23/2018: NAEON. Still requiring 4L O2, qualified for home O2 yesterday. Will check echo today as had not one before.       Review of Systems   Unable to perform ROS: Dementia     Objective:     Vital Signs (Most Recent):  Temp: 98.7 °F (37.1 °C) (02/23/18 0809)  Pulse: 72 (02/23/18 0809)  Resp: 20 (02/23/18 0809)  BP: (!) 107/55 (02/23/18 0809)  SpO2: 100 % (02/23/18 0809) Vital Signs (24h Range):  Temp:  [97 °F (36.1 °C)-99.3 °F (37.4 °C)] 98.7 °F (37.1 °C)  Pulse:  [67-90] 72  Resp:  [16-24] 20  SpO2:  [96 %-100 %] 100 %  BP: (106-116)/(50-58) 107/55     Weight: 47.5 kg (104 lb 11.5 oz)  Body mass index is 17.97 kg/m².  No intake or output data in the 24 hours ending 02/23/18 0835   Physical Exam   Constitutional: No distress.   Cachectic bed bound   Cardiovascular: Normal rate, regular rhythm, normal heart sounds and intact distal pulses.    Pulmonary/Chest: Effort normal. No respiratory distress. She has no wheezes. She has rhonchi in the right middle field and the right lower field. She has no rales.   Post pneumonectomy changes of chest wall- left chest wall depressed    Abdominal: Soft. Bowel sounds are normal. She exhibits no distension. There is no tenderness.   Musculoskeletal: She exhibits edema (improving UE).   Neurological: She is alert.   Skin: Skin is warm and dry. She is not diaphoretic.       Significant Labs:   CBC:     Recent Labs  Lab 02/22/18  0415 02/23/18  0336   WBC 12.73* 10.23   HGB 8.7* 7.4*   HCT 29.9* 24.9*    297     CMP:     Recent Labs  Lab 02/22/18  0415 02/23/18  0336    141   K 3.4* 4.4    102   CO2 30* 30*   * 107   BUN 18 17   CREATININE 0.8 0.9   CALCIUM 8.1* 8.0*   PROT 6.3 5.6*   ALBUMIN 1.7* 1.5*   BILITOT 0.2 0.3   ALKPHOS 81 72   AST 30 26   ALT 32 29   ANIONGAP 9 9   EGFRNONAA >60.0 56.9*     Magnesium:     Recent Labs  Lab 02/22/18  0415 02/23/18  0336   MG 1.8 1.6     POCT Glucose:     Recent Labs  Lab 02/22/18  1156 02/22/18  1652 02/22/18  2030   POCTGLUCOSE 169* 195* 159*       Significant Imaging:   BILATERAL LOWER EXTREMITY DUPLEX ULTRASOUND 2/21/2018    Technique: The bilateral lower extremity deep venous system was imaged by ultrasound from the groin to the upper calf.  Veins were analyzed with grayscale, transducer compression, color doppler, and doppler spectral analysis.      Comparison: None.    FINDINGS:    RIGHT LEG:  The common femoral, superficial femoral, popliteal, peroneal and anterior and posterior tibial veins show no signs of deep vein thrombosis.  The common femoral, superficial femoral and popliteal veins were examined using spectral analysis and show normal wave forms with normal response to augmentation and respiration.    LEFT LEG:  The common femoral, superficial femoral, popliteal, peroneal and anterior and posterior tibial veins show no signs of deep vein thrombosis.  The common femoral, superficial femoral and popliteal veins were examined using spectral analysis and show normal wave forms with normal response to augmentation and respiration.    There is a fluid collection in popliteal  fossa measuring 2.0 x 0.9 x 2.8 cm, likely a Baker's cyst.   Impression        No evidence of deep venous thrombosis in either lower extremity.    Left popliteal fossa fluid collection, likely a Baker's cyst.       CXR PA & Lateral 2/21/2018  Complete opacification of left hemithorax consistent with previous pneumonectomy.  Cardiomediastinal shift to the left as before.  Small ill-defined opacities and/or subsegmental atelectatic changes noted in the right lung base.  Otherwise the right lung is clear.    No significant pleural effusion     I have reviewed and interpreted all pertinent imaging results/findings within the past 24 hours.    Assessment/Plan:      * Acute respiratory failure with hypoxia    Acute hypoxic respiratory failure in the setting of left pneumonectomy.  - Recently discharged from [facility] after 6 day admission  - Rapid flu negative  - Afebrile, WCC 12  - Procal mildly elevated 0.46  - DDx HAP vs aspiration PNA vs. Pulmonary HTN vs ADCHF  - LE US negative for DVT, D-dimer elevated but suspect related to infection   - BCx and UCx NGTD  - Respiratory viral panel not sent   - SLP cleared yesterday for thin liquids  - ABG on non-rebreather on 2/21 with pO2 of 43  - CXR yesterday without significant detrimental changes  - Improved overnight after furosemide 20mg IV x2    Plan  - Still requiring 4L, qualified for home O2  - Send respiratory viral panel  - Continue pip/tazo  - BNP increased today to 132, continue furosemide 20mg IV BID  - Will also check echo        Dementia without behavioral disturbance    - Continue home donepezil 10mg qd and mirtazapine 7.5mg qhs  - Delirium precautions        Alteration in skin integrity    - Wound care consulted, appreciate recs  - Q2 turns  - Heel boot protectors  - Low air loss mattress        History of diabetes mellitus    A1c 2/8/2018 = 5.4   - On metformin at home  - POCT glucose checks AC & HS  - LDSSI for now        Essential hypertension    - Continue home  nifedipine 60mg qd        Severe protein-calorie malnutrition    - Mirtazapine 7.5mg qhs        Hyperlipidemia    - Continue rosuvastatin 20mg        Bacteremia due to Escherichia coli    - Secondary to E.coli per last admission.  - Last day of ciprofloxacin on 2/23 - but being treated with pip/tazo here which would provide adequate coverage.  - UA demonstrable for clearing infection.   - BCx here NGTD        Deep tissue injury                VTE Risk Mitigation         Ordered     enoxaparin injection 30 mg  Daily     Route:  Subcutaneous        02/20/18 1559     Medium Risk of VTE  Once      02/20/18 1600              Loreto Westbrook MD  Department of Hospital Medicine   [facility]

## 2018-02-23 NOTE — PLAN OF CARE
CM notified by Dane-TORI that's pt's home o2 approved and portable tank will be delivered bedside 2/23 although pt is d/c'ing tmw - CM called pt's RNToi to notify of plan.     02/23/18 7648   Discharge Reassessment   Assessment Type Discharge Planning Reassessment   Do you have any problems affording any of your prescribed medications? No   Discharge Plan A Home with family;Home Health   Discharge Plan B Hospice/home   Patient choice form signed by patient/caregiver N/A   Can the patient answer the patient profile reliably? No, cognitively impaired   How does the patient rate their overall health at the present time? Poor   Describe the patient's ability to walk at the present time. Major restrictions/daily assistance from another person   How often would a person be available to care for the patient? Whenever needed   Number of comorbid conditions (as recorded on the chart) Three   During the past month, has the patient often been bothered by feeling down, depressed or hopeless? Yes   During the past month, has the patient often been bothered by little interest or pleasure in doing things? Yes

## 2018-02-23 NOTE — PLAN OF CARE
02/23/2018      Delfina Pelletier  [address] [place] [facility] [id number]          Hospital Medicine Dept.  [facility] [address] [place] [address]  [phone] after hours  [phone] fax Principal Diagnosis:  Acute respiratory failure with hypoxia  Oxygen Requiring Condition: Congestive Heart Failure                         Portable Oxygen / Compressor    Pulse Oximetry:   40% on Room Air on 2/22/2018 date at rest    Prescribe:  4 Liter/min Nasal Canula continuous      __________________________  Loreto Westbrook MD  02/23/2018

## 2018-02-24 VITALS
WEIGHT: 104.75 LBS | SYSTOLIC BLOOD PRESSURE: 114 MMHG | BODY MASS INDEX: 17.88 KG/M2 | HEIGHT: 64 IN | DIASTOLIC BLOOD PRESSURE: 56 MMHG | RESPIRATION RATE: 20 BRPM | OXYGEN SATURATION: 100 % | HEART RATE: 61 BPM | TEMPERATURE: 97 F

## 2018-02-24 PROBLEM — R78.81 BACTEREMIA DUE TO ESCHERICHIA COLI: Chronic | Status: RESOLVED | Noted: 2018-02-09 | Resolved: 2018-02-24

## 2018-02-24 PROBLEM — B96.20 BACTEREMIA DUE TO ESCHERICHIA COLI: Chronic | Status: RESOLVED | Noted: 2018-02-09 | Resolved: 2018-02-24

## 2018-02-24 LAB
ALBUMIN SERPL BCP-MCNC: 1.6 G/DL
ALP SERPL-CCNC: 69 U/L
ALT SERPL W/O P-5'-P-CCNC: 29 U/L
ANION GAP SERPL CALC-SCNC: 7 MMOL/L
AST SERPL-CCNC: 26 U/L
BASOPHILS # BLD AUTO: 0.05 K/UL
BASOPHILS NFR BLD: 0.5 %
BILIRUB SERPL-MCNC: 0.2 MG/DL
BUN SERPL-MCNC: 19 MG/DL
CALCIUM SERPL-MCNC: 8.4 MG/DL
CHLORIDE SERPL-SCNC: 98 MMOL/L
CO2 SERPL-SCNC: 36 MMOL/L
CREAT SERPL-MCNC: 0.7 MG/DL
DIFFERENTIAL METHOD: ABNORMAL
EOSINOPHIL # BLD AUTO: 0.1 K/UL
EOSINOPHIL NFR BLD: 1.1 %
ERYTHROCYTE [DISTWIDTH] IN BLOOD BY AUTOMATED COUNT: 14.4 %
EST. GFR  (AFRICAN AMERICAN): >60 ML/MIN/1.73 M^2
EST. GFR  (NON AFRICAN AMERICAN): >60 ML/MIN/1.73 M^2
GLUCOSE SERPL-MCNC: 140 MG/DL
HCT VFR BLD AUTO: 31.6 %
HGB BLD-MCNC: 9.8 G/DL
IMM GRANULOCYTES # BLD AUTO: 0.04 K/UL
IMM GRANULOCYTES NFR BLD AUTO: 0.4 %
LYMPHOCYTES # BLD AUTO: 1.3 K/UL
LYMPHOCYTES NFR BLD: 12.6 %
MAGNESIUM SERPL-MCNC: 1.8 MG/DL
MCH RBC QN AUTO: 28.5 PG
MCHC RBC AUTO-ENTMCNC: 31 G/DL
MCV RBC AUTO: 92 FL
MONOCYTES # BLD AUTO: 0.7 K/UL
MONOCYTES NFR BLD: 6.8 %
NEUTROPHILS # BLD AUTO: 8.3 K/UL
NEUTROPHILS NFR BLD: 78.6 %
NRBC BLD-RTO: 0 /100 WBC
PHOSPHATE SERPL-MCNC: 2.6 MG/DL
PLATELET # BLD AUTO: 309 K/UL
PMV BLD AUTO: 10.8 FL
POCT GLUCOSE: 123 MG/DL (ref 70–110)
POTASSIUM SERPL-SCNC: 3.8 MMOL/L
PROT SERPL-MCNC: 6.1 G/DL
RBC # BLD AUTO: 3.44 M/UL
SODIUM SERPL-SCNC: 141 MMOL/L
WBC # BLD AUTO: 10.57 K/UL

## 2018-02-24 PROCEDURE — 84100 ASSAY OF PHOSPHORUS: CPT

## 2018-02-24 PROCEDURE — 83735 ASSAY OF MAGNESIUM: CPT

## 2018-02-24 PROCEDURE — 27000221 HC OXYGEN, UP TO 24 HOURS

## 2018-02-24 PROCEDURE — 80053 COMPREHEN METABOLIC PANEL: CPT

## 2018-02-24 PROCEDURE — 94668 MNPJ CHEST WALL SBSQ: CPT

## 2018-02-24 PROCEDURE — 25000003 PHARM REV CODE 250: Performed by: STUDENT IN AN ORGANIZED HEALTH CARE EDUCATION/TRAINING PROGRAM

## 2018-02-24 PROCEDURE — 85025 COMPLETE CBC W/AUTO DIFF WBC: CPT

## 2018-02-24 PROCEDURE — A4216 STERILE WATER/SALINE, 10 ML: HCPCS | Performed by: STUDENT IN AN ORGANIZED HEALTH CARE EDUCATION/TRAINING PROGRAM

## 2018-02-24 PROCEDURE — 99900035 HC TECH TIME PER 15 MIN (STAT)

## 2018-02-24 PROCEDURE — 99239 HOSP IP/OBS DSCHRG MGMT >30: CPT | Mod: ,,, | Performed by: INTERNAL MEDICINE

## 2018-02-24 PROCEDURE — 36415 COLL VENOUS BLD VENIPUNCTURE: CPT

## 2018-02-24 RX ORDER — FAMOTIDINE 20 MG/1
20 TABLET, FILM COATED ORAL DAILY
Status: DISCONTINUED | OUTPATIENT
Start: 2018-02-24 | End: 2018-02-24 | Stop reason: HOSPADM

## 2018-02-24 RX ORDER — NIFEDIPINE 30 MG/1
30 TABLET, EXTENDED RELEASE ORAL DAILY
Qty: 30 TABLET | Refills: 3 | Status: SHIPPED | OUTPATIENT
Start: 2018-02-24 | End: 2019-02-24

## 2018-02-24 RX ORDER — LEVOFLOXACIN 250 MG/1
TABLET ORAL
Qty: 4 TABLET | Refills: 0 | Status: SHIPPED | OUTPATIENT
Start: 2018-02-24

## 2018-02-24 RX ORDER — ENOXAPARIN SODIUM 100 MG/ML
40 INJECTION SUBCUTANEOUS EVERY 24 HOURS
Status: DISCONTINUED | OUTPATIENT
Start: 2018-02-24 | End: 2018-02-24 | Stop reason: HOSPADM

## 2018-02-24 RX ORDER — LEVOFLOXACIN 500 MG/1
500 TABLET, FILM COATED ORAL DAILY
Qty: 3 TABLET | Refills: 0 | Status: SHIPPED | OUTPATIENT
Start: 2018-02-24 | End: 2018-02-24 | Stop reason: HOSPADM

## 2018-02-24 RX ADMIN — SODIUM CHLORIDE, PRESERVATIVE FREE 5 ML: 5 INJECTION INTRAVENOUS at 05:02

## 2018-02-24 RX ADMIN — AMOXICILLIN AND CLAVULANATE POTASSIUM 1 TABLET: 875; 125 TABLET, FILM COATED ORAL at 09:02

## 2018-02-24 RX ADMIN — ROSUVASTATIN CALCIUM 20 MG: 20 TABLET, FILM COATED ORAL at 09:02

## 2018-02-24 RX ADMIN — NIFEDIPINE 30 MG: 30 TABLET, FILM COATED, EXTENDED RELEASE ORAL at 09:02

## 2018-02-24 RX ADMIN — MAGNESIUM OXIDE TAB 400 MG (241.3 MG ELEMENTAL MG) 400 MG: 400 (241.3 MG) TAB at 09:02

## 2018-02-24 RX ADMIN — FAMOTIDINE 20 MG: 20 TABLET, FILM COATED ORAL at 09:02

## 2018-02-24 RX ADMIN — DONEPEZIL HYDROCHLORIDE 10 MG: 5 TABLET, FILM COATED ORAL at 09:02

## 2018-02-24 NOTE — ASSESSMENT & PLAN NOTE
Acute hypoxic respiratory failure in the setting of left pneumonectomy.  - Recently discharged from [facility] after 6 day admission  - Rapid flu negative  - Afebrile, WCC 12  - Procal mildly elevated 0.46  - DDx HAP vs aspiration PNA vs. Pulmonary HTN vs ADCHF  - LE US negative for DVT, D-dimer elevated but suspect related to infection   - BCx and UCx NGTD  - Respiratory viral panel not sent   - SLP cleared yesterday for thin liquids  - ABG on non-rebreather on 2/21 with pO2 of 43  - CXR yesterday without significant detrimental changes  - Improved overnight after furosemide 20mg IV x2  - Echo with signs of pulmonary HTN    Plan  - Discharge home today, continue Home Health  - Follow up with PCP as scheduled on Tuesday  - Home oxygen arranged, 3-4L via NC  - Complete 7 day course of antibiotics for CAP, levofloxacin 500mg today, then 250mg tomorrow and Monday (last dose on 2/26/2018)

## 2018-02-24 NOTE — SUBJECTIVE & OBJECTIVE
Review of Systems   Unable to perform ROS: Dementia     Objective:     Vital Signs (Most Recent):  Temp: 97 °F (36.1 °C) (02/24/18 0800)  Pulse: 61 (02/24/18 0800)  Resp: 20 (02/24/18 0800)  BP: (!) 114/56 (02/24/18 0800)  SpO2: 100 % (02/24/18 0800) Vital Signs (24h Range):  Temp:  [97 °F (36.1 °C)-99 °F (37.2 °C)] 97 °F (36.1 °C)  Pulse:  [61-75] 61  Resp:  [16-23] 20  SpO2:  [96 %-100 %] 100 %  BP: (114-125)/(56-62) 114/56     Weight: 47.5 kg (104 lb 11.5 oz)  Body mass index is 17.97 kg/m².    Intake/Output Summary (Last 24 hours) at 02/24/18 1134  Last data filed at 02/24/18 0300   Gross per 24 hour   Intake           437.92 ml   Output             1500 ml   Net         -1062.08 ml      Physical Exam   Constitutional: No distress.   Cachectic bed bound   Cardiovascular: Normal rate, regular rhythm, normal heart sounds and intact distal pulses.    Pulmonary/Chest: Effort normal. No respiratory distress. She has no wheezes. She has rhonchi in the right middle field and the right lower field. She has no rales.   Post pneumonectomy changes of chest wall- left chest wall depressed   Abdominal: Soft. Bowel sounds are normal. She exhibits no distension. There is no tenderness.   Musculoskeletal: She exhibits edema (improving UE).   Neurological: She is alert.   Skin: Skin is warm and dry. She is not diaphoretic.       Significant Labs:   CBC:     Recent Labs  Lab 02/23/18  0336 02/23/18  1114 02/24/18  0530   WBC 10.23 10.26 10.57   HGB 7.4* 7.6* 9.8*   HCT 24.9* 26.3* 31.6*    325 309     CMP:     Recent Labs  Lab 02/23/18  0336 02/24/18  0530    141   K 4.4 3.8    98   CO2 30* 36*    140*   BUN 17 19   CREATININE 0.9 0.7   CALCIUM 8.0* 8.4*   PROT 5.6* 6.1   ALBUMIN 1.5* 1.6*   BILITOT 0.3 0.2   ALKPHOS 72 69   AST 26 26   ALT 29 29   ANIONGAP 9 7*   EGFRNONAA 56.9* >60.0     Magnesium:     Recent Labs  Lab 02/23/18  0336 02/24/18  0530   MG 1.6 1.8     POCT Glucose:     Recent Labs  Lab  02/23/18  1842 02/23/18  2134 02/24/18  0810   POCTGLUCOSE 107 112* 123*       Significant Imaging:   BILATERAL LOWER EXTREMITY DUPLEX ULTRASOUND 2/21/2018    Technique: The bilateral lower extremity deep venous system was imaged by ultrasound from the groin to the upper calf.  Veins were analyzed with grayscale, transducer compression, color doppler, and doppler spectral analysis.      Comparison: None.    FINDINGS:    RIGHT LEG:  The common femoral, superficial femoral, popliteal, peroneal and anterior and posterior tibial veins show no signs of deep vein thrombosis.  The common femoral, superficial femoral and popliteal veins were examined using spectral analysis and show normal wave forms with normal response to augmentation and respiration.    LEFT LEG:  The common femoral, superficial femoral, popliteal, peroneal and anterior and posterior tibial veins show no signs of deep vein thrombosis.  The common femoral, superficial femoral and popliteal veins were examined using spectral analysis and show normal wave forms with normal response to augmentation and respiration.    There is a fluid collection in popliteal fossa measuring 2.0 x 0.9 x 2.8 cm, likely a Baker's cyst.   Impression        No evidence of deep venous thrombosis in either lower extremity.    Left popliteal fossa fluid collection, likely a Baker's cyst.       CXR PA & Lateral 2/21/2018  Complete opacification of left hemithorax consistent with previous pneumonectomy.  Cardiomediastinal shift to the left as before.  Small ill-defined opacities and/or subsegmental atelectatic changes noted in the right lung base.  Otherwise the right lung is clear.    No significant pleural effusion     I have reviewed and interpreted all pertinent imaging results/findings within the past 24 hours.

## 2018-02-24 NOTE — DISCHARGE SUMMARY
[facility] Hospital Medicine  Discharge Summary      Patient Name: Delfina Pelletier  MRN: [id number]  Admission Date: 2/20/2018  Hospital Length of Stay: 4 days  Discharge Date and Time:  02/24/2018 11:40 AM  Attending Physician: Se Valenzuela MD   Discharging Provider: Loreto Westbrook MD  Primary Care Provider: Dimitri Castellanos MD  Hospital Medicine Team: [facility] HOSP MED 4 Loreto Westbrook MD    HPI:   89yo F with dementia, T2DM, history of partial pneumonectomy for TB and recent admit for urosepsis with E. Coli bacteremia, presents on advice of Home Health nurse after she was found to have low O2 sats. Patient herself does not have any complaints. Per daughter (main caregiver) and sister at bedside she has had a non-productive cough over the last few days, no fever or chills. Daughter and sister deny any changes to her mental status (at baseline AAOx1). No chest pain. No abdo pain/ nausea or vomiting. She voids into diaper and daughter says she hasn't noticed any change in urine color or smell when changing her. No dysuria. She was admitted to [facility] from 2/8/2018 to 2/14/2018 with urosepsis with E. Coli bacteremia- received 3 doses of ceftriaxone and discharged on course of ciprofloxacin with Home Health. Daughter says patient has been bed bound since her last admission. During last admission, she was evaluated by SLP who recommended dental soft with nectar thick liquids. Daughter says she has continued on soft diet although she was drinking water at home. Daughter says there have been no signs of cough or regurgitating food/drink. She has sacral and right heel decubitus sores which have remained unchanged since she was discharged.     In ED she was found to have pulse oximetry of 87% on RA, improved to 95% with 2L O2. Her BNP was 88 (lower than on day of discharge on last admit), her procal was 0.46, rapid flu was negative and CXR did not show any convincing focal consolidation. Blood and urine  cultures were drawn, she was given a 30 cc/kg bolus of normal saline and started on vanc and pip/tazo.    Hospital Course:   02/20/2018: Admitted to IM4. Started vanc & pipt/tazo for presumed PNA (HAP vs aspiration).  02/21/2018: Patient with noted hypoxia upon check in the morning (SpO2 in 70-80s) on 2L nasal cannula. Patient was put on non-rebreather with resolution of low O2 saturations. Of note, patient with noted hypoxemia on ABG with pO2 of 43. She otherwise was in no respiratory distress and looked comfortable without use of accessory muscles. Patient remained afebrile without worsening cough. D-dimer elevated but US BLE was negative for DVT, suspect D-dimer elevation related to infection.  02/22/2018: NAEON. Afebrile. Weaned to 4L NC after furosemide, continuing to wean O2. Decompensation yesterday likely related to sepsis bolus.   02/23/2018: NAEON. Still requiring 4L O2, qualified for home O2 yesterday. Will check echo today as had not one before.   02/24/2018: NAEON. Weaned to 3L O2. Echo showing pulmonary hypertension. Will discharge home today, continue with home health. Complete 7 day course of antibiotics for CAP (last dose on Monday 2/26/2018). Follow up with PCP as planned on Tuesday.       Review of Systems   Unable to perform ROS: Dementia     Objective:     Vital Signs (Most Recent):  Temp: 97 °F (36.1 °C) (02/24/18 0800)  Pulse: 61 (02/24/18 0800)  Resp: 20 (02/24/18 0800)  BP: (!) 114/56 (02/24/18 0800)  SpO2: 100 % (02/24/18 0800) Vital Signs (24h Range):  Temp:  [97 °F (36.1 °C)-99 °F (37.2 °C)] 97 °F (36.1 °C)  Pulse:  [61-75] 61  Resp:  [16-23] 20  SpO2:  [96 %-100 %] 100 %  BP: (114-125)/(56-62) 114/56     Weight: 47.5 kg (104 lb 11.5 oz)  Body mass index is 17.97 kg/m².    Intake/Output Summary (Last 24 hours) at 02/24/18 1134  Last data filed at 02/24/18 0300   Gross per 24 hour   Intake           437.92 ml   Output             1500 ml   Net         -1062.08 ml      Physical Exam    Constitutional: No distress.   Cachectic bed bound   Cardiovascular: Normal rate, regular rhythm, normal heart sounds and intact distal pulses.    Pulmonary/Chest: Effort normal. No respiratory distress. She has no wheezes. She has rhonchi in the right middle field and the right lower field. She has no rales.   Post pneumonectomy changes of chest wall- left chest wall depressed   Abdominal: Soft. Bowel sounds are normal. She exhibits no distension. There is no tenderness.   Musculoskeletal: She exhibits edema (improving UE).   Neurological: She is alert.   Skin: Skin is warm and dry. She is not diaphoretic.     * Acute respiratory failure with hypoxia    Acute hypoxic respiratory failure in the setting of left pneumonectomy.  - Recently discharged from [facility] after 6 day admission  - Rapid flu negative  - Afebrile, WCC 12  - Procal mildly elevated 0.46  - DDx HAP vs aspiration PNA vs. Pulmonary HTN vs ADCHF  - LE US negative for DVT, D-dimer elevated but suspect related to infection   - BCx and UCx NGTD  - Respiratory viral panel not sent   - SLP cleared yesterday for thin liquids  - ABG on non-rebreather on 2/21 with pO2 of 43  - CXR yesterday without significant detrimental changes  - Improved overnight after furosemide 20mg IV x2  - Echo with signs of pulmonary HTN    Plan  - Discharge home today, continue Home Health  - Follow up with PCP as scheduled on Tuesday  - Home oxygen arranged, 3-4L via NC  - Complete 7 day course of antibiotics for CAP, levofloxacin 500mg today, then 250mg tomorrow and Monday (last dose on 2/26/2018)        Dementia without behavioral disturbance    - Continue home donepezil 10mg qd and mirtazapine 7.5mg qhs        Alteration in skin integrity    - Continue wound care with Home Health        History of diabetes mellitus    A1c 2/8/2018 = 5.4  - Resume home metformin        Essential hypertension    - Continue home nifedipine, reduced dose to 30mg qd as BP was consistently low here         Severe protein-calorie malnutrition    - Mirtazapine 7.5mg qhs        Hyperlipidemia    - Continue rosuvastatin 20mg        Bacteremia due to Escherichia coli    - Secondary to E.coli per last admission.  - Last day of ciprofloxacin on 2/23 - but being treated with pip/tazo here which would provide adequate coverage.  - UA negative   - BCx and Urine Cx here no growth after 5 days  - Resolved          Final Active Diagnoses:    Diagnosis Date Noted POA    PRINCIPAL PROBLEM:  Acute respiratory failure with hypoxia [J96.01] 02/11/2018 Yes    Dementia without behavioral disturbance [F03.90] 02/04/2016 Yes    Alteration in skin integrity [R23.9] 02/09/2018 Yes    History of diabetes mellitus [Z86.39] 09/17/2014 Yes    Essential hypertension [I10] 09/17/2014 Yes    Severe protein-calorie malnutrition [E43] 02/14/2018 Yes    Hyperlipidemia [E78.5] 02/20/2018 Yes     Chronic    Bacteremia due to Escherichia coli [R78.81] 02/09/2018 Yes     Chronic    Deep tissue injury [T14.8XXA] 02/09/2018 Yes      Problems Resolved During this Admission:    Diagnosis Date Noted Date Resolved POA       Discharged Condition: good    Disposition: Home or Self Care    Follow Up:  Follow-up Information     Dimitri Castellanos MD On 2/27/2018.    Specialty:  Internal Medicine  Why:  Hospital Follow-up Tuesday 2/27 @ 1pm  Contact information:  [address] [place] [address]  Melvin [place]MIJO [address] [phone]             [facility].    Specialties:  Home Health Services, Physical Therapy, Occupational Therapy  Why:  Home Health  Contact information:  [address] [address] [phone]                 Patient Instructions:     OXYGEN FOR HOME USE   Order Specific Question Answer Comments   Liter Flow 4    Duration Continuous    Qualifying SpO2: 40    Testing done at: Rest    Route nasal cannula    Portable mode: continuous    Device home concentrator with portable unit    Length of need (in months): 99  "mos    Patient condition with qualifying saturation CHF    Height: 5' 4" (1.626 m)    Weight: 47.5 kg (104 lb 11.5 oz)    Does patient have medical equipment at home? bedside commode    Does patient have medical equipment at home? hospital bed    Does patient have medical equipment at home? shower chair    Does patient have medical equipment at home? walker, rolling    Alternative treatment measures have been tried or considered and deemed clinically ineffective. Yes    Vendor: [facility]    Expected Date of Delivery: 2/23/2018      Activity as tolerated     Notify your health care provider if you experience any of the following:  temperature >100.4     Notify your health care provider if you experience any of the following:  severe uncontrolled pain     Notify your health care provider if you experience any of the following:  persistent nausea and vomiting or diarrhea     Notify your health care provider if you experience any of the following:  difficulty breathing or increased cough     Notify your health care provider if you experience any of the following:  worsening rash     Notify your health care provider if you experience any of the following:  severe persistent headache     Notify your health care provider if you experience any of the following:  persistent dizziness, light-headedness, or visual disturbances     Notify your health care provider if you experience any of the following:  increased confusion or weakness         Significant Diagnostic Studies: Labs:   CMP   Recent Labs  Lab 02/23/18  0336 02/24/18  0530    141   K 4.4 3.8    98   CO2 30* 36*    140*   BUN 17 19   CREATININE 0.9 0.7   CALCIUM 8.0* 8.4*   PROT 5.6* 6.1   ALBUMIN 1.5* 1.6*   BILITOT 0.3 0.2   ALKPHOS 72 69   AST 26 26   ALT 29 29   ANIONGAP 9 7*   ESTGFRAFRICA >60.0 >60.0   EGFRNONAA 56.9* >60.0   , CBC   Recent Labs  Lab 02/23/18  0336 02/23/18  1114 02/24/18  0530   WBC 10.23 10.26 10.57   HGB 7.4* 7.6* 9.8* "   HCT 24.9* 26.3* 31.6*    325 309   , INR   Lab Results   Component Value Date    INR 1.0 02/20/2018    INR 1.0 02/08/2018    INR 0.9 09/17/2017   , Lipid Panel   Lab Results   Component Value Date    CHOL 187 06/15/2017    HDL 66 06/15/2017    LDLCALC 105.4 06/15/2017    TRIG 78 06/15/2017    CHOLHDL 35.3 06/15/2017   , Troponin   Recent Labs  Lab 02/20/18  1205   TROPONINI 0.069*   , A1C:   Recent Labs  Lab 08/31/17  1155 09/01/17  0510 02/08/18  1547   HGBA1C 5.2 5.1 5.4    and All labs within the past 24 hours have been reviewed  Microbiology:   Blood Culture   Lab Results   Component Value Date    LABBLOO No Growth to date 02/20/2018    LABBLOO No Growth to date 02/20/2018    LABBLOO No Growth to date 02/20/2018    LABBLOO No Growth to date 02/20/2018    and Urine Culture    Lab Results   Component Value Date    LABURIN No growth 02/20/2018     Radiology:   Imaging Results          X-Ray Chest AP Portable (Final result)  Result time 02/20/18 13:02:40    Final result by Tien Brown MD (02/20/18 13:02:40)                 Impression:      Grossly stable chronic pulmonary findings, no convincing large focal consolidation.  Interstitial edema on the right remains.        Electronically signed by: TIEN BROWN MD  Date:     02/20/18  Time:    13:02              Narrative:    Chest AP portable    Indication:Sepsis    Comparison:12/11/2018    Findings:  The cardiomediastinal silhouette is grossly stable the configuration noting mediastinal shift to the left and calcification of the aortic arch, similar to the previous exam.  There is no pleural effusion.  The trachea is midline.  The lungs are asymmetrically expanded in this left pneumonectomy patient, the right hemithorax is fully expanded. There is coarse interstitial attenuation throughout the right lung, similar to the previous exam. No large focal consolidation seen.  There is no pneumothorax.  The osseous structures are remarkable for  degenerative changes, as well as dextroscoliotic curvature of the thoracic spine.                                Pending Diagnostic Studies:     None         Medications:  Reconciled Home Medications:   Current Discharge Medication List      START taking these medications    Details   levoFLOXacin (LEVAQUIN) 250 MG tablet Take 2 tablets today (2/24//2018), then take 1 tablet a day for 2 days. Last dose on Monday 2/26/2018.  Qty: 4 tablet, Refills: 0         CONTINUE these medications which have CHANGED    Details   NIFEdipine (PROCARDIA-XL) 30 MG (OSM) 24 hr tablet Take 1 tablet (30 mg total) by mouth once daily.  Qty: 30 tablet, Refills: 3         CONTINUE these medications which have NOT CHANGED    Details   blood sugar diagnostic Strp 1 each by Misc.(Non-Drug; Combo Route) route once daily. True Metrix Glucometer Test Strips  Test blood sugar once daily  Qty: 100 each, Refills: 1    Associated Diagnoses: Type 2 diabetes mellitus without complication, unspecified long term insulin use status      donepezil (ARICEPT) 10 MG tablet Take 1 tablet (10 mg total) by mouth once daily.  Qty: 90 tablet, Refills: 3    Associated Diagnoses: Dementia without behavioral disturbance, unspecified dementia type      lancets Misc 1 each by Misc.(Non-Drug; Combo Route) route once daily. TRUE METRIX METER  Qty: 100 each, Refills: 1    Associated Diagnoses: Type 2 diabetes mellitus without complication, unspecified long term insulin use status      metFORMIN (GLUCOPHAGE) 500 MG tablet Take 1 tablet (500 mg total) by mouth 2 (two) times daily with meals.  Qty: 180 tablet, Refills: 3      mirtazapine (REMERON) 7.5 MG Tab Take 1 tablet (7.5 mg total) by mouth every evening.  Qty: 60 tablet, Refills: 4      rosuvastatin (CRESTOR) 20 MG tablet Take 1 tablet (20 mg total) by mouth once daily.  Qty: 90 tablet, Refills: 3    Associated Diagnoses: Combined hyperlipidemia associated with type 2 diabetes mellitus         STOP taking these  medications       ciprofloxacin HCl (CIPRO) 500 MG tablet Comments:   Reason for Stopping:               Indwelling Lines/Drains at time of discharge:   Lines/Drains/Airways     Drain            Female External Urinary Catheter 02/23/18 0701 1 day          Pressure Ulcer                 Pressure Injury 02/09/18 Left lateral Malleolus Deep tissue injury 15 days         Pressure Injury 02/09/18 Right medial Heel Deep tissue injury 15 days         Pressure Injury 02/21/18 Left lower Leg Deep tissue injury 3 days                Time spent on the discharge of patient: >30 minutes  Patient was seen and examined on the date of discharge and determined to be suitable for discharge.         Loreto Westbrook MD  Department of Hospital Medicine  [facility]

## 2018-02-24 NOTE — PLAN OF CARE
Problem: Patient Care Overview  Goal: Plan of Care Review  Outcome: Ongoing (interventions implemented as appropriate)  Patient free from falls or injury. All care explained. Unit PRBCs completed without complication. Questions addressed. Bed in low and locked position. Call light within reach.

## 2018-02-24 NOTE — ASSESSMENT & PLAN NOTE
- Secondary to E.coli per last admission.  - Last day of ciprofloxacin on 2/23 - but being treated with pip/tazo here which would provide adequate coverage.  - UA negative   - BCx and Urine Cx here no growth after 5 days  - Resolved

## 2018-02-25 LAB
BACTERIA BLD CULT: NORMAL
BACTERIA BLD CULT: NORMAL

## 2018-02-26 ENCOUNTER — TELEPHONE (OUTPATIENT)
Dept: FAMILY MEDICINE | Facility: CLINIC | Age: 83
End: 2018-02-26

## 2018-02-26 DIAGNOSIS — F03.90 DEMENTIA WITHOUT BEHAVIORAL DISTURBANCE, UNSPECIFIED DEMENTIA TYPE: ICD-10-CM

## 2018-02-26 DIAGNOSIS — E43 SEVERE PROTEIN-CALORIE MALNUTRITION: ICD-10-CM

## 2018-02-26 DIAGNOSIS — R09.02 HYPOXIA: ICD-10-CM

## 2018-02-26 DIAGNOSIS — L89.312 DECUBITUS ULCER OF RIGHT BUTTOCK, STAGE 2: Primary | ICD-10-CM

## 2018-02-26 NOTE — TELEPHONE ENCOUNTER
Spoke with granddaughter - no glc check today.  As long as glc < 210 would not start any meds.  They'll start checking.  If high may consider metformin low dose but would really avoid additional medication on her.    Will order WC

## 2018-02-26 NOTE — PT/OT/SLP DISCHARGE
Physical Therapy Discharge Summary    Name: Delfina Pelletier  MRN: [id number]   Principal Problem: Acute respiratory failure with hypoxia     Patient Discharged from acute Physical Therapy on 18.  Please refer to prior PT noted date on 18 for functional status.     Assessment:     Patient appropriate for care in another setting.    Objective:     GOALS:    Physical Therapy Goals        Problem: Physical Therapy Goal    Goal Priority Disciplines Outcome Goal Variances Interventions   Physical Therapy Goal     PT/OT, PT Ongoing (interventions implemented as appropriate)     Description:  Goals to be met by: 18     Patient will increase functional independence with mobility by performin. Supine to sit with MInimal Assistance  2. Sit to supine with MInimal Assistance  3. Rolling to Left and Right with Stand-by Assistance.  4. Sit to stand transfer with Maximum Assistance  5. Bed to chair transfer with Maximum Assistance using RW  6. Gait  x 10 feet with Maximum Assistance using Rolling Walker.   7. Sitting at edge of bed x10 minutes with Stand-by Assistance                      Reasons for Discontinuation of Therapy Services  Transfer to alternate level of care.      Plan:     Patient Discharged to: Home with Home Health Service.    Samanta Shin, PT  2018

## 2018-02-26 NOTE — TELEPHONE ENCOUNTER
Spoke w/patient's daughter, request advice. States patient was instructed while in the hospital to take Metformin. States  had taken patient off the medication. States patient was getting insulin in the hospital, but she cannot administer the insulin. States BS has been running in the 200. Please advise.

## 2018-02-26 NOTE — TELEPHONE ENCOUNTER
----- Message from Kym Quezada sent at 2/26/2018  8:19 AM CST -----  Contact: daughter- steph   Pt's daughter would like to speak to nurse regarding pt's medication. She states Dr. Castellanos took pt off of Metformin but it is still listed on her current meds. She also states she cannot give pt insulin. She also states she is unable to bring pt in because pt cannot walk. She is asking for order for wheelchair for pt.   [phone].

## 2018-02-26 NOTE — TELEPHONE ENCOUNTER
----- Message from Tabitha Nichols sent at 2/26/2018  2:41 PM CST -----  Contact: Alee- [facility]   [facility] with [facility] says they need orders for Wound Care to right heel and stage 4 Sacral decubitus ulcer . Orders for a wheel chair, low air loss mattress. They also has questions regarding her metformin. Patient is also bed ridden and they don't know how she will make it to her appointment tomorrow.Please call at [phone] ext [phone]. Fax: [phone]

## 2018-02-27 ENCOUNTER — TELEPHONE (OUTPATIENT)
Dept: FAMILY MEDICINE | Facility: CLINIC | Age: 83
End: 2018-02-27

## 2018-02-27 DIAGNOSIS — L89.159 DECUBITUS ULCER OF SACRAL REGION, UNSPECIFIED ULCER STAGE: Primary | ICD-10-CM

## 2018-02-27 NOTE — TELEPHONE ENCOUNTER
----- Message from Kym Quezada sent at 2/27/2018  8:29 AM CST -----  Contact: Meghan with [facility]  Rep states pt was just sent home from hospital with Stage 4 Sacrum.   Asking for wound care orders. Wound is Infected w/ foul smell and yellow drainage. Also asking for Low Air loss mattress, standard wheel chair, and set up with wound care clinic. She will probably need antibiotics also.   Call back #   [phone].    She states she is on her way to her house now.

## 2018-02-28 NOTE — PLAN OF CARE
Pt d/c to home w/ h/h - [facility] & new home o2.     02/28/18 1052   Final Note   Assessment Type Final Discharge Note   Discharge Disposition Home-Health   What phone number can be called within the next 1-3 days to see how you are doing after discharge? [phone]   Right [facility] Referral Info   Post Acute Recommendation Home-care   Referral Type h/h   Facility Name [facility]

## 2018-03-01 ENCOUNTER — TELEPHONE (OUTPATIENT)
Dept: FAMILY MEDICINE | Facility: CLINIC | Age: 83
End: 2018-03-01

## 2018-03-01 NOTE — TELEPHONE ENCOUNTER
----- Message from Brooklynn Barry sent at 3/1/2018  9:10 AM CST -----  Contact: Jo-Ann valle/ [facility] care  [phone]  ext [phone]  Pt has several bed sores which one look really bad, she wants to send pictures ans get wound care orders. Pls call  Jo-Ann valle/ [facility] [phone] ext [phone]. Thanks.......Stephanie

## 2018-03-01 NOTE — TELEPHONE ENCOUNTER
----- Message from Brenda Zendejas sent at 3/1/2018  9:48 AM CST -----  Contact: Jo-Ann with [facility]  Patient family is request hospice, Need a verbal okay for hospice. Jo-Ann can be reached at [phone] ext [phone].      Thanks,

## 2018-03-06 NOTE — PHYSICIAN QUERY
PT Name: Delfina Pelletier  MR #: [id number]     Physician Query Form - Documentation Clarification      CDS/: aBylee Heller     RN CCDS       Contact information: [email or web address]    This form is a permanent document in the medical record.     Query Date: March 6, 2018    By submitting this query, we are merely seeking further clarification of documentation. Please utilize your independent clinical judgment when addressing the question(s) below.    The Medical record reflects the following:    Supporting Clinical Findings Location in Medical Record   Bacteremia due to Escherichia    Secondary to E.coli per last admission.  - Last day of ciprofloxacin on 2/23 - but being treated with pip/tazo here which would provide adequate coverage.  - UA negative   - BCx and Urine Cx here no growth after 5 days  - Resolved     2/20 resolved 2/24     Recently discharged from [facility] after 6 day admission  - Rapid flu negative  - Afebrile, WCC 12  - Procal mildly elevated 0.46  - DDx HAP vs aspiration PNA vs. Pulmonary HTN vs ADCHF  - LE US negative for DVT, D-dimer elevated but suspect related to infection   - BCx and UCx NGTD  - Respiratory viral panel not sent   - SLP cleared yesterday for thin liquids  - ABG on non-rebreather on 2/21 with pO2 of 43  - CXR yesterday without significant detrimental changes    Complete 7 day course of antibiotics for CAP, levofloxacin 500mg today, then 250mg tomorrow and Monday (last dose on 2/26/2018)    Acute respiratory failure with hypoxia - HAP and with recent admission and really seemed to improve with IV diuresis so c/w CHF    Sepsis protocol initiated DS                                            D/S          H&P                                                                                          Please clarify if the patient was being treated for sepsis on this admission.  Thanks you.    Provider Use Only    [ X  ]  Sepsis ruled in    [   ] Sepsis ruled out    [   ]  Other______________                                                                                                                       [  ] Clinically undetermined

## 2018-03-15 NOTE — PT/OT/SLP DISCHARGE
Occupational Therapy Discharge Summary    Delfina Pelletier  MRN: [id number]   Principal Problem: Acute respiratory failure with hypoxia      Patient Discharged from acute Occupational Therapy on 2/22/18.  Please refer to prior OT note dated 2/11/18 for functional status.    Assessment:      Patient was discharged unexpectedly.  Information required to complete an accurate discharge summary is unknown.  Refer to therapy initial evaluation and last progress note for initial and most recent functional status and goal achievement.  Recommendations made may be found in medical record.    Objective:     GOALS:    Occupational Therapy Goals        Problem: Occupational Therapy Goal    Goal Priority Disciplines Outcome Interventions   Occupational Therapy Goal     OT, PT/OT Ongoing (interventions implemented as appropriate)    Description:  Goals to be met by: 2/28/18    Patient will increase functional independence with ADLs by performing:    UE Dressing with Moderate Assistance.  Grooming while seated with Minimal Assistance.  Sitting at edge of bed x15 minutes with Minimal Assistance.  Rolling to Bilateral with Moderate Assistance.   Supine to sit with Moderate Assistance.  Stand pivot transfers with Moderate Assistance.                      Reasons for Discontinuation of Therapy Services  Transfer to alternate level of care.      Plan:     Patient Discharged to: Home with Home Health Service    ZAHRAA Solitario  3/15/2018

## 2018-03-16 NOTE — PHYSICIAN QUERY
PT Name: Delfina Pelletier  MR #: [id number]     Physician Query Form - Documentation Clarification      CDS/: Baylee Heller  RN CCDS             Contact information: [email or web address]    This form is a permanent document in the medical record.     Query Date: March 16, 2018    By submitting this query, we are merely seeking further clarification of documentation. Please utilize your independent clinical judgment when addressing the question(s) below.    The Medical record reflects the following:    Supporting Clinical Findings Location in Medical Record     Acute respiratory failure with hypoxia - HAP and with recent admission and really seemed to improve with IV diuresis so c/w CHF     CHF - improved after IV diuresis        D/S     CONCLUSIONS     1 - Normal left ventricular systolic function (EF 60-65%).     2 - Left atrial enlargement.     3 - Concentric remodeling.     4 - Indeterminate LV diastolic function.     5 - Normal right ventricular systolic function .     6 - Pulmonary hypertension. The estimated PA systolic pressure is 49 mmHg.     7 - Mild to moderate mitral regurgitation.     8 - Mild tricuspid regurgitation.        Echo 2/23                                                                            Please further clarify the type and acuity of the CHF.  Thank you.    Provider Use Only        [  X  ] Acute on Chronic Diastolic CHF    [    ] Acute Diastolic CHF    [    ] Other___________________________                                                                                                                       [  ] Clinically undetermined

## 2019-01-28 NOTE — PROGRESS NOTES
Patient is in the supine position.   The body was positioned using the following devices: safety strap and gel pad mattress.  The head was positioned using the following devices: gel pad mattress and regular pillow.  The left arm was positioned using the following devices: arm board and gel arm pads.  The right arm was positioned using the following devices: arm board and gel arm pads.  The left leg was positioned using the following devices: blanket.  The right leg was positioned using the following devices: blanket.  The patient was positioned by She Pelaez RN, Cheri Lemons RN  This note was created by combination of typed  and Dragon dictation.  Transcription errors may be present.  If there are any questions, please contact me.    Assessment & Plan  Physical deconditioning  Fall, initial encounter  -Transportation is a barrier for the patient and her daughter.  Home health referral for physical therapy and occupational therapy.  -     Ambulatory referral to Home Health    Abnormal glucose-A1c has been excellent, stopped metformin, daughter noted one reading to 500 yesterday that normalized after repeat after 5 minutes so I think that the first one was erroneous.  She should stay off the metformin.  I would not restart.    There are no discontinued medications.    Follow-up: No Follow-up on file.      =================================================================      Chief Complaint   Patient presents with    Diabetes     bs running 500       HPI  Delfina is a 89 y.o. female, last appointment with this clinic was 1/19/2018.    No LMP recorded. Patient has had a hysterectomy.    DM2 with neuropathy; metformin  HTN; nifedipine  Hyperlipidemia; rosuvastatin  Urinary incontinence  Vit D deficiency  Dementia, aricept.  Hx of left pneumonectomy.  3/25/2013 colonoscopy severe diverticulosis.  6/15/2017 DEXA with osteoporosis. Hx of bisphosphonate. Discontinued with workup for weight loss.  Weight loss.  Subsequently dx'd with UTI.    I saw her earlier this month.  Diabetes, plan was to stop the metformin given A1c has been consistently below 5.5.  Consider reducing or eliminating nifedipine.  Polyuria, checking urine    Here with daughter - notes that sugar yesterday AM was 500 and her daughter didn't believe it so washed the pt's hands and checked it again and it was 134 after about 5 min.  Was normal the rest of the day.  This AM by recall in the low 100s. Daughter notes that the patient may get up and eat in the middle of the night.   Pt without complaints.      Pt refuses to  provide urine sample.    Daughter notes that the pt has a walker but refuses to use it.  Daughter states that the pt does not want to walk.  When she is pressed to walk or threatened to have assistance to get from one part of the room to the other, then she'll walk and do it on her own. Had PT come to the house in 9/2017 by daughter's recollection, and did OK and then stopped.  Transportation is an issue.  Lives with daughter and daughter does not have vehicle.  Has grandchildren who do but not consistent.    Patient Care Team:  Dimitri Castellanos MD as PCP - General (Internal Medicine)    Patient Active Problem List    Diagnosis Date Noted    Anorexia 08/31/2017    Dehydration 08/31/2017    Age-related osteoporosis without current pathological fracture 06/22/2017    Dementia without behavioral disturbance 02/04/2016    Essential hypertension 09/17/2014    History of diabetes mellitus 09/17/2014    Weight loss 02/14/2013    DJD (degenerative joint disease) of lumbar spine 10/22/2012    Diabetic neuropathy 10/22/2012    UTI (urinary tract infection) 08/02/2012       PAST MEDICAL HISTORY:  Past Medical History:   Diagnosis Date    Diabetes mellitus     Diabetes mellitus type II     Diabetic neuropathy     Hyperlipidemia     Hypertension     Tuberculosis        PAST SURGICAL HISTORY:  Past Surgical History:   Procedure Laterality Date    left lung lobectomy for TB      removed at 20yrs old       SOCIAL HISTORY:  Social History     Social History    Marital status:      Spouse name: N/A    Number of children: 4    Years of education: N/A     Occupational History    retired -       Social History Main Topics    Smoking status: Former Smoker     Quit date: 1/19/1988    Smokeless tobacco: Never Used    Alcohol use No    Drug use: No    Sexual activity: No     Other Topics Concern    Not on file     Social History Narrative    No narrative on file       ALLERGIES AND MEDICATIONS: updated and  reviewed.  Review of patient's allergies indicates:  No Known Allergies  Current Outpatient Prescriptions   Medication Sig Dispense Refill    blood sugar diagnostic Strp 1 each by Misc.(Non-Drug; Combo Route) route once daily. True Metrix Glucometer Test Strips  Test blood sugar once daily 100 each 1    docusate sodium (STOOL SOFTENER) 250 MG capsule Take 1 capsule (250 mg total) by mouth once daily.      donepezil (ARICEPT) 10 MG tablet Take 1 tablet (10 mg total) by mouth once daily. 90 tablet 3    lancets Misc 1 each by Misc.(Non-Drug; Combo Route) route once daily. TRUE METRIX METER 100 each 1    metformin (GLUCOPHAGE) 500 MG tablet Take 1 tablet (500 mg total) by mouth 2 (two) times daily with meals. 180 tablet 1    NIFEdipine (NIFEDICAL XL) 60 MG (OSM) 24 hr tablet Take 1 tablet (60 mg total) by mouth once daily. 90 tablet 3    polyethylene glycol (MIRALAX) 17 gram/dose powder Take 17 g by mouth once daily. 238 g 0    rosuvastatin (CRESTOR) 20 MG tablet Take 1 tablet (20 mg total) by mouth once daily. 90 tablet 3    blood-glucose meter kit Use as instructed 1 each 0     No current facility-administered medications for this visit.        Review of Systems   Unable to perform ROS: Dementia   Constitutional: Negative for chills and fever.       Physical Exam   Vitals:    01/26/18 0951   BP: (!) 120/58   Pulse: 94   Temp: 98 °F (36.7 °C)   SpO2: 98%   Weight: 43.4 kg (95 lb 10.9 oz)    Body mass index is 18.69 kg/m².  Weight: 43.4 kg (95 lb 10.9 oz)         Physical Exam   Constitutional: She appears well-developed and well-nourished. No distress.   Eyes: EOM are normal.   Cardiovascular: Normal rate, regular rhythm and normal heart sounds.    No murmur heard.  Pulmonary/Chest: Effort normal and breath sounds normal.   Musculoskeletal:   Able to ambulate without assistance but occasionally's worse to the right/lose his balance.  Did not fall.  Able to correct without assistance but held onto the wall when  she did.   Neurological: She is alert. Coordination normal.   Skin: Skin is warm and dry.   Psychiatric:   Flattened affect.  Responds appropriately to questions, yes and no.  Paucity of speech.

## 2020-01-06 DIAGNOSIS — E11.40 DIABETIC NEUROPATHY: ICD-10-CM

## 2021-12-21 NOTE — PLAN OF CARE
Pt to d/c to home w/ h/h - [facility]. Pt's family will d/c w/ pt as soon as pt's hospital bed is delivered this afternoon/evening.     02/14/18 1555   Final Note   Assessment Type Final Discharge Note   Discharge Disposition Home-Health   What phone number can be called within the next 1-3 days to see how you are doing after discharge? [phone]   Hospital Follow Up  Appt(s) scheduled? Yes   Right Care Referral Info   Post Acute Recommendation Home-care   Referral Type h/h   Facility Name [facility]      Total Volume (Ccs): 1.5 Add Option For Additional Mediation: No Detail Level: Simple Administered By (Optional): Nona Ross Concentration (Mg/Ml) Of Additional Medication: 2.5 Consent: The risks of atrophy were reviewed with the patient. Concentration (Mg/Ml): 40.0 Kenalog Preparation: kenalog